# Patient Record
Sex: MALE | Race: WHITE | Employment: OTHER | URBAN - METROPOLITAN AREA
[De-identification: names, ages, dates, MRNs, and addresses within clinical notes are randomized per-mention and may not be internally consistent; named-entity substitution may affect disease eponyms.]

---

## 2017-02-08 ENCOUNTER — OFFICE VISIT (OUTPATIENT)
Dept: CARDIOLOGY CLINIC | Age: 62
End: 2017-02-08

## 2017-02-08 VITALS
HEIGHT: 66 IN | BODY MASS INDEX: 36.03 KG/M2 | SYSTOLIC BLOOD PRESSURE: 168 MMHG | WEIGHT: 224.2 LBS | HEART RATE: 84 BPM | DIASTOLIC BLOOD PRESSURE: 98 MMHG

## 2017-02-08 DIAGNOSIS — Z98.61 HISTORY OF PTCA: ICD-10-CM

## 2017-02-08 DIAGNOSIS — Z92.89 H/O ECHOCARDIOGRAM: ICD-10-CM

## 2017-02-08 DIAGNOSIS — E78.5 HYPERLIPIDEMIA, UNSPECIFIED HYPERLIPIDEMIA TYPE: ICD-10-CM

## 2017-02-08 DIAGNOSIS — Z86.19 HISTORY OF SHINGLES: ICD-10-CM

## 2017-02-08 DIAGNOSIS — Z79.4 TYPE 2 DIABETES MELLITUS WITH OTHER CIRCULATORY COMPLICATION, WITH LONG-TERM CURRENT USE OF INSULIN (HCC): ICD-10-CM

## 2017-02-08 DIAGNOSIS — I25.10 CORONARY ARTERY DISEASE INVOLVING NATIVE CORONARY ARTERY OF NATIVE HEART WITHOUT ANGINA PECTORIS: ICD-10-CM

## 2017-02-08 DIAGNOSIS — N20.0 KIDNEY STONE: ICD-10-CM

## 2017-02-08 DIAGNOSIS — E66.9 OBESITY, UNSPECIFIED OBESITY SEVERITY, UNSPECIFIED OBESITY TYPE: ICD-10-CM

## 2017-02-08 DIAGNOSIS — Z95.1 S/P CABG X 4: ICD-10-CM

## 2017-02-08 DIAGNOSIS — I10 ESSENTIAL HYPERTENSION: ICD-10-CM

## 2017-02-08 DIAGNOSIS — E66.09 NON MORBID OBESITY DUE TO EXCESS CALORIES: ICD-10-CM

## 2017-02-08 DIAGNOSIS — E11.59 TYPE 2 DIABETES MELLITUS WITH OTHER CIRCULATORY COMPLICATION, WITH LONG-TERM CURRENT USE OF INSULIN (HCC): ICD-10-CM

## 2017-02-08 DIAGNOSIS — I25.810 CORONARY ARTERY DISEASE INVOLVING CORONARY BYPASS GRAFT OF NATIVE HEART WITHOUT ANGINA PECTORIS: Primary | ICD-10-CM

## 2017-02-08 PROCEDURE — 99214 OFFICE O/P EST MOD 30 MIN: CPT | Performed by: INTERNAL MEDICINE

## 2017-02-08 RX ORDER — HYDROCHLOROTHIAZIDE 12.5 MG/1
12.5 TABLET ORAL DAILY
Qty: 90 TABLET | Refills: 3 | Status: SHIPPED | OUTPATIENT
Start: 2017-02-08 | End: 2017-06-01 | Stop reason: ALTCHOICE

## 2017-02-08 RX ORDER — LISINOPRIL 10 MG/1
10 TABLET ORAL DAILY
Qty: 90 TABLET | Refills: 3 | Status: ON HOLD | OUTPATIENT
Start: 2017-02-08 | End: 2017-02-12

## 2017-02-08 RX ORDER — ASPIRIN 81 MG/1
81 TABLET ORAL DAILY
Qty: 90 TABLET | Refills: 3 | Status: SHIPPED | OUTPATIENT
Start: 2017-02-08

## 2017-02-08 RX ORDER — CARVEDILOL 25 MG/1
25 TABLET ORAL 2 TIMES DAILY
Qty: 180 TABLET | Refills: 3 | Status: SHIPPED | OUTPATIENT
Start: 2017-02-08

## 2017-02-08 RX ORDER — ATORVASTATIN CALCIUM 10 MG/1
10 TABLET, FILM COATED ORAL DAILY
Qty: 90 TABLET | Refills: 3 | Status: SHIPPED | OUTPATIENT
Start: 2017-02-08 | End: 2018-02-27 | Stop reason: SDUPTHER

## 2017-02-11 PROBLEM — I16.1 HYPERTENSIVE EMERGENCY: Status: ACTIVE | Noted: 2017-02-11

## 2017-02-11 LAB
ALBUMIN SERPL-MCNC: 4 G/DL
ALP BLD-CCNC: 82 U/L
ALT SERPL-CCNC: 16 U/L
AST SERPL-CCNC: 20 U/L
BILIRUB SERPL-MCNC: 0.5 MG/DL (ref 0.1–1.4)
BUN BLDV-MCNC: 13 MG/DL
CALCIUM SERPL-MCNC: 9.7 MG/DL
CHLORIDE BLD-SCNC: 100 MMOL/L
CHOLESTEROL, TOTAL: 173 MG/DL
CHOLESTEROL/HDL RATIO: NORMAL
CO2: 28 MMOL/L
CREAT SERPL-MCNC: 0.8 MG/DL
GFR CALCULATED: NORMAL
GLUCOSE BLD-MCNC: 189 MG/DL
HDLC SERPL-MCNC: 60 MG/DL (ref 35–70)
LDL CHOLESTEROL CALCULATED: 92 MG/DL (ref 0–160)
POTASSIUM SERPL-SCNC: 4.1 MMOL/L
SODIUM BLD-SCNC: 138 MMOL/L
TOTAL PROTEIN: 6.9
TRIGL SERPL-MCNC: 103 MG/DL
VLDLC SERPL CALC-MCNC: 21 MG/DL

## 2017-02-23 ENCOUNTER — OFFICE VISIT (OUTPATIENT)
Dept: CARDIOLOGY CLINIC | Age: 62
End: 2017-02-23

## 2017-02-23 VITALS
WEIGHT: 224 LBS | HEART RATE: 88 BPM | DIASTOLIC BLOOD PRESSURE: 70 MMHG | BODY MASS INDEX: 36 KG/M2 | SYSTOLIC BLOOD PRESSURE: 138 MMHG | HEIGHT: 66 IN

## 2017-02-23 DIAGNOSIS — N20.0 KIDNEY STONE: ICD-10-CM

## 2017-02-23 DIAGNOSIS — I10 ESSENTIAL HYPERTENSION: ICD-10-CM

## 2017-02-23 DIAGNOSIS — I25.810 CORONARY ARTERY DISEASE INVOLVING CORONARY BYPASS GRAFT OF NATIVE HEART WITHOUT ANGINA PECTORIS: Primary | ICD-10-CM

## 2017-02-23 DIAGNOSIS — Z79.4 TYPE 2 DIABETES MELLITUS WITH OTHER CIRCULATORY COMPLICATION, WITH LONG-TERM CURRENT USE OF INSULIN (HCC): ICD-10-CM

## 2017-02-23 DIAGNOSIS — I25.10 CORONARY ARTERY DISEASE INVOLVING NATIVE CORONARY ARTERY OF NATIVE HEART WITHOUT ANGINA PECTORIS: ICD-10-CM

## 2017-02-23 DIAGNOSIS — E66.9 OBESITY, UNSPECIFIED OBESITY SEVERITY, UNSPECIFIED OBESITY TYPE: ICD-10-CM

## 2017-02-23 DIAGNOSIS — E78.5 HYPERLIPIDEMIA, UNSPECIFIED HYPERLIPIDEMIA TYPE: ICD-10-CM

## 2017-02-23 DIAGNOSIS — E11.59 TYPE 2 DIABETES MELLITUS WITH OTHER CIRCULATORY COMPLICATION, WITH LONG-TERM CURRENT USE OF INSULIN (HCC): ICD-10-CM

## 2017-02-23 DIAGNOSIS — Z95.1 S/P CABG X 4: ICD-10-CM

## 2017-02-23 DIAGNOSIS — Z86.19 HISTORY OF SHINGLES: ICD-10-CM

## 2017-02-23 DIAGNOSIS — Z98.61 HISTORY OF PTCA: ICD-10-CM

## 2017-02-23 PROCEDURE — 99214 OFFICE O/P EST MOD 30 MIN: CPT | Performed by: INTERNAL MEDICINE

## 2017-02-23 RX ORDER — ATORVASTATIN CALCIUM 40 MG/1
40 TABLET, FILM COATED ORAL DAILY
Qty: 90 TABLET | Refills: 0 | Status: CANCELLED | OUTPATIENT
Start: 2017-02-23

## 2017-02-23 RX ORDER — LISINOPRIL 20 MG/1
20 TABLET ORAL EVERY 12 HOURS SCHEDULED
Qty: 60 TABLET | Refills: 5 | Status: SHIPPED | OUTPATIENT
Start: 2017-02-23 | End: 2017-06-01 | Stop reason: ALTCHOICE

## 2017-03-13 ENCOUNTER — PROCEDURE VISIT (OUTPATIENT)
Dept: CARDIOLOGY CLINIC | Age: 62
End: 2017-03-13

## 2017-03-13 DIAGNOSIS — I25.10 CORONARY ARTERY DISEASE INVOLVING NATIVE CORONARY ARTERY OF NATIVE HEART WITHOUT ANGINA PECTORIS: ICD-10-CM

## 2017-03-13 DIAGNOSIS — E66.9 OBESITY, UNSPECIFIED OBESITY SEVERITY, UNSPECIFIED OBESITY TYPE: ICD-10-CM

## 2017-03-13 DIAGNOSIS — Z95.1 S/P CABG X 4: ICD-10-CM

## 2017-03-13 DIAGNOSIS — R09.89 BRUIT OF RIGHT CAROTID ARTERY: Primary | ICD-10-CM

## 2017-03-13 DIAGNOSIS — Z98.61 HISTORY OF PTCA: ICD-10-CM

## 2017-03-13 DIAGNOSIS — E78.5 HYPERLIPIDEMIA, UNSPECIFIED HYPERLIPIDEMIA TYPE: ICD-10-CM

## 2017-03-13 DIAGNOSIS — R06.02 SOB (SHORTNESS OF BREATH): Primary | ICD-10-CM

## 2017-03-13 DIAGNOSIS — E11.59 TYPE 2 DIABETES MELLITUS WITH OTHER CIRCULATORY COMPLICATION, WITH LONG-TERM CURRENT USE OF INSULIN (HCC): ICD-10-CM

## 2017-03-13 DIAGNOSIS — I10 ESSENTIAL HYPERTENSION: ICD-10-CM

## 2017-03-13 DIAGNOSIS — Z86.19 HISTORY OF SHINGLES: ICD-10-CM

## 2017-03-13 DIAGNOSIS — N20.0 KIDNEY STONE: ICD-10-CM

## 2017-03-13 DIAGNOSIS — I25.810 CORONARY ARTERY DISEASE INVOLVING CORONARY BYPASS GRAFT OF NATIVE HEART WITHOUT ANGINA PECTORIS: ICD-10-CM

## 2017-03-13 DIAGNOSIS — R06.02 SOB (SHORTNESS OF BREATH): ICD-10-CM

## 2017-03-13 DIAGNOSIS — Z79.4 TYPE 2 DIABETES MELLITUS WITH OTHER CIRCULATORY COMPLICATION, WITH LONG-TERM CURRENT USE OF INSULIN (HCC): ICD-10-CM

## 2017-03-13 DIAGNOSIS — R07.89 CHEST PRESSURE: Primary | ICD-10-CM

## 2017-03-13 LAB
LV EF: 55 %
LV EF: 55 %
LVEF MODALITY: NORMAL
LVEF MODALITY: NORMAL

## 2017-03-13 PROCEDURE — A9500 TC99M SESTAMIBI: HCPCS | Performed by: INTERNAL MEDICINE

## 2017-03-13 PROCEDURE — 93017 CV STRESS TEST TRACING ONLY: CPT | Performed by: INTERNAL MEDICINE

## 2017-03-13 PROCEDURE — 93016 CV STRESS TEST SUPVJ ONLY: CPT | Performed by: INTERNAL MEDICINE

## 2017-03-13 PROCEDURE — 93880 EXTRACRANIAL BILAT STUDY: CPT | Performed by: INTERNAL MEDICINE

## 2017-03-13 PROCEDURE — 93306 TTE W/DOPPLER COMPLETE: CPT | Performed by: INTERNAL MEDICINE

## 2017-03-13 PROCEDURE — 78452 HT MUSCLE IMAGE SPECT MULT: CPT | Performed by: INTERNAL MEDICINE

## 2017-03-13 PROCEDURE — 93018 CV STRESS TEST I&R ONLY: CPT | Performed by: INTERNAL MEDICINE

## 2017-03-14 ENCOUNTER — TELEPHONE (OUTPATIENT)
Dept: CARDIOLOGY CLINIC | Age: 62
End: 2017-03-14

## 2017-03-20 ENCOUNTER — OFFICE VISIT (OUTPATIENT)
Dept: CARDIOLOGY CLINIC | Age: 62
End: 2017-03-20

## 2017-03-20 VITALS
BODY MASS INDEX: 37.22 KG/M2 | WEIGHT: 231.6 LBS | HEIGHT: 66 IN | HEART RATE: 82 BPM | SYSTOLIC BLOOD PRESSURE: 124 MMHG | DIASTOLIC BLOOD PRESSURE: 76 MMHG

## 2017-03-20 DIAGNOSIS — Z79.4 TYPE 2 DIABETES MELLITUS WITH OTHER CIRCULATORY COMPLICATION, WITH LONG-TERM CURRENT USE OF INSULIN (HCC): ICD-10-CM

## 2017-03-20 DIAGNOSIS — E11.59 TYPE 2 DIABETES MELLITUS WITH OTHER CIRCULATORY COMPLICATION, WITH LONG-TERM CURRENT USE OF INSULIN (HCC): ICD-10-CM

## 2017-03-20 DIAGNOSIS — I10 ESSENTIAL HYPERTENSION: ICD-10-CM

## 2017-03-20 DIAGNOSIS — Z92.89 H/O ECHOCARDIOGRAM: ICD-10-CM

## 2017-03-20 DIAGNOSIS — E78.5 HYPERLIPIDEMIA, UNSPECIFIED HYPERLIPIDEMIA TYPE: ICD-10-CM

## 2017-03-20 DIAGNOSIS — N20.0 KIDNEY STONE: ICD-10-CM

## 2017-03-20 DIAGNOSIS — Z98.61 HISTORY OF PTCA: ICD-10-CM

## 2017-03-20 DIAGNOSIS — I25.810 CORONARY ARTERY DISEASE INVOLVING CORONARY BYPASS GRAFT OF NATIVE HEART WITHOUT ANGINA PECTORIS: Primary | ICD-10-CM

## 2017-03-20 DIAGNOSIS — E66.09 NON MORBID OBESITY DUE TO EXCESS CALORIES: ICD-10-CM

## 2017-03-20 DIAGNOSIS — Z86.19 HISTORY OF SHINGLES: ICD-10-CM

## 2017-03-20 DIAGNOSIS — Z95.1 S/P CABG X 4: ICD-10-CM

## 2017-03-20 PROCEDURE — 99214 OFFICE O/P EST MOD 30 MIN: CPT | Performed by: INTERNAL MEDICINE

## 2017-03-27 ENCOUNTER — PAT TELEPHONE (OUTPATIENT)
Dept: SURGERY | Age: 62
End: 2017-03-27

## 2017-03-27 VITALS — BODY MASS INDEX: 36.16 KG/M2 | HEIGHT: 66 IN | WEIGHT: 225 LBS

## 2017-03-27 ASSESSMENT — ENCOUNTER SYMPTOMS: SHORTNESS OF BREATH: 1

## 2017-03-28 ENCOUNTER — HOSPITAL ENCOUNTER (OUTPATIENT)
Dept: SURGERY | Age: 62
Discharge: HOME | End: 2017-03-28
Attending: SPECIALIST | Admitting: SPECIALIST

## 2017-03-28 VITALS
WEIGHT: 227 LBS | HEIGHT: 66 IN | DIASTOLIC BLOOD PRESSURE: 77 MMHG | TEMPERATURE: 97 F | RESPIRATION RATE: 16 BRPM | OXYGEN SATURATION: 96 % | HEART RATE: 73 BPM | SYSTOLIC BLOOD PRESSURE: 145 MMHG | BODY MASS INDEX: 36.48 KG/M2

## 2017-03-28 LAB — GLUCOSE BLD-MCNC: 78 MG/DL (ref 70–99)

## 2017-03-28 RX ORDER — SODIUM CHLORIDE, SODIUM LACTATE, POTASSIUM CHLORIDE, CALCIUM CHLORIDE 600; 310; 30; 20 MG/100ML; MG/100ML; MG/100ML; MG/100ML
INJECTION, SOLUTION INTRAVENOUS CONTINUOUS
Status: DISCONTINUED | OUTPATIENT
Start: 2017-03-28 | End: 2017-03-29 | Stop reason: HOSPADM

## 2017-03-28 RX ORDER — OMEPRAZOLE 20 MG/1
20 CAPSULE, DELAYED RELEASE ORAL DAILY
Qty: 30 CAPSULE | Refills: 3 | Status: SHIPPED | OUTPATIENT
Start: 2017-03-28 | End: 2019-02-20

## 2017-03-28 RX ADMIN — SODIUM CHLORIDE, SODIUM LACTATE, POTASSIUM CHLORIDE, CALCIUM CHLORIDE: 600; 310; 30; 20 INJECTION, SOLUTION INTRAVENOUS at 06:37

## 2017-03-28 ASSESSMENT — PAIN SCALES - GENERAL
PAINLEVEL_OUTOF10: 0
PAINLEVEL_OUTOF10: 0

## 2017-03-28 ASSESSMENT — PAIN - FUNCTIONAL ASSESSMENT: PAIN_FUNCTIONAL_ASSESSMENT: 0-10

## 2017-04-10 ENCOUNTER — HOSPITAL ENCOUNTER (OUTPATIENT)
Dept: GENERAL RADIOLOGY | Age: 62
Discharge: HOME | End: 2017-04-10
Attending: SPECIALIST | Admitting: SPECIALIST

## 2017-04-10 DIAGNOSIS — R10.9 ABDOMINAL PAIN, UNSPECIFIED SITE: ICD-10-CM

## 2017-04-24 ENCOUNTER — HOSPITAL ENCOUNTER (OUTPATIENT)
Dept: OTHER | Age: 62
Discharge: HOME | End: 2017-04-24
Attending: PHYSICIAN ASSISTANT | Admitting: PHYSICIAN ASSISTANT

## 2017-04-24 DIAGNOSIS — M25.552 PAIN IN JOINT, PELVIC REGION AND THIGH, LEFT: ICD-10-CM

## 2017-04-24 DIAGNOSIS — M54.5 LOW BACK PAIN, UNSPECIFIED BACK PAIN LATERALITY, UNSPECIFIED CHRONICITY, WITH SCIATICA PRESENCE UNSPECIFIED: ICD-10-CM

## 2017-04-24 DIAGNOSIS — M25.551 PAIN IN JOINT, PELVIC REGION AND THIGH, RIGHT: ICD-10-CM

## 2017-06-15 RX ORDER — ISOSORBIDE MONONITRATE 60 MG/1
60 TABLET, EXTENDED RELEASE ORAL DAILY
Qty: 30 TABLET | Refills: 6 | Status: SHIPPED | OUTPATIENT
Start: 2017-06-15 | End: 2018-01-03 | Stop reason: SDUPTHER

## 2017-09-18 ENCOUNTER — OFFICE VISIT (OUTPATIENT)
Dept: CARDIOLOGY CLINIC | Age: 62
End: 2017-09-18

## 2017-09-18 VITALS
BODY MASS INDEX: 35.2 KG/M2 | HEIGHT: 66 IN | WEIGHT: 219 LBS | HEART RATE: 68 BPM | DIASTOLIC BLOOD PRESSURE: 78 MMHG | SYSTOLIC BLOOD PRESSURE: 116 MMHG

## 2017-09-18 DIAGNOSIS — E78.5 HYPERLIPIDEMIA, UNSPECIFIED HYPERLIPIDEMIA TYPE: ICD-10-CM

## 2017-09-18 DIAGNOSIS — Z98.61 HISTORY OF PTCA: ICD-10-CM

## 2017-09-18 DIAGNOSIS — I25.810 CORONARY ARTERY DISEASE INVOLVING CORONARY BYPASS GRAFT OF NATIVE HEART WITHOUT ANGINA PECTORIS: Primary | ICD-10-CM

## 2017-09-18 DIAGNOSIS — E66.09 NON MORBID OBESITY DUE TO EXCESS CALORIES: ICD-10-CM

## 2017-09-18 DIAGNOSIS — I10 ESSENTIAL HYPERTENSION: ICD-10-CM

## 2017-09-18 DIAGNOSIS — E11.59 TYPE 2 DIABETES MELLITUS WITH OTHER CIRCULATORY COMPLICATION, UNSPECIFIED LONG TERM INSULIN USE STATUS: ICD-10-CM

## 2017-09-18 DIAGNOSIS — Z95.1 S/P CABG X 4: ICD-10-CM

## 2017-09-18 DIAGNOSIS — N20.0 KIDNEY STONE: ICD-10-CM

## 2017-09-18 PROCEDURE — 99213 OFFICE O/P EST LOW 20 MIN: CPT | Performed by: INTERNAL MEDICINE

## 2018-01-05 RX ORDER — ISOSORBIDE MONONITRATE 60 MG/1
60 TABLET, EXTENDED RELEASE ORAL DAILY
Qty: 90 TABLET | Refills: 3 | Status: SHIPPED | OUTPATIENT
Start: 2018-01-05 | End: 2018-08-22

## 2018-02-06 ENCOUNTER — OFFICE VISIT (OUTPATIENT)
Dept: CARDIOLOGY CLINIC | Age: 63
End: 2018-02-06

## 2018-02-06 VITALS
BODY MASS INDEX: 34.75 KG/M2 | DIASTOLIC BLOOD PRESSURE: 80 MMHG | HEIGHT: 66 IN | SYSTOLIC BLOOD PRESSURE: 138 MMHG | WEIGHT: 216.2 LBS | HEART RATE: 84 BPM

## 2018-02-06 DIAGNOSIS — Z95.1 S/P CABG X 4: ICD-10-CM

## 2018-02-06 DIAGNOSIS — N20.0 KIDNEY STONE: ICD-10-CM

## 2018-02-06 DIAGNOSIS — E11.59 TYPE 2 DIABETES MELLITUS WITH OTHER CIRCULATORY COMPLICATION, UNSPECIFIED LONG TERM INSULIN USE STATUS: ICD-10-CM

## 2018-02-06 DIAGNOSIS — Z98.61 HISTORY OF PTCA: ICD-10-CM

## 2018-02-06 DIAGNOSIS — E78.5 HYPERLIPIDEMIA, UNSPECIFIED HYPERLIPIDEMIA TYPE: ICD-10-CM

## 2018-02-06 DIAGNOSIS — E66.09 CLASS 2 OBESITY DUE TO EXCESS CALORIES WITHOUT SERIOUS COMORBIDITY WITH BODY MASS INDEX (BMI) OF 35.0 TO 35.9 IN ADULT: ICD-10-CM

## 2018-02-06 DIAGNOSIS — I10 ESSENTIAL HYPERTENSION: ICD-10-CM

## 2018-02-06 DIAGNOSIS — I25.810 CORONARY ARTERY DISEASE INVOLVING CORONARY BYPASS GRAFT OF NATIVE HEART WITHOUT ANGINA PECTORIS: Primary | ICD-10-CM

## 2018-02-06 PROCEDURE — 99214 OFFICE O/P EST MOD 30 MIN: CPT | Performed by: INTERNAL MEDICINE

## 2018-02-06 RX ORDER — LISINOPRIL 40 MG/1
40 TABLET ORAL DAILY
Qty: 90 TABLET | Refills: 3 | Status: SHIPPED | OUTPATIENT
Start: 2018-02-06

## 2018-02-06 NOTE — PROGRESS NOTES
5' 6\" (1.676 m)   Wt 216 lb 3.2 oz (98.1 kg)   BMI 34.90 kg/m²   Wt Readings from Last 3 Encounters:   02/06/18 216 lb 3.2 oz (98.1 kg)   09/18/17 219 lb (99.3 kg)   06/01/17 220 lb (99.8 kg)     Body mass index is 34.9 kg/m². GENERAL - Alert, oriented, pleasant, in no apparent distress. EYES: No jaundice, no conjunctival pallor. SKIN: It is warm & dry. No rashes. No Echhymosis    HEENT  No clinically significant abnormalities seen. Neck - Supple. No jugular venous distention noted. No carotid bruits. Cardiovascular  Normal S1 and S2 without obvious murmur or gallop. Extremities - No cyanosis, clubbing, or significant edema. Pulmonary  No respiratory distress. No wheezes or rales. Abdomen  No masses, tenderness, or organomegaly. Musculoskeletal  No significant edema. No joint deformities. No muscle wasting. Neurologic  Cranial nerves II through XII are grossly intact. There were no gross focal neurologic abnormalities.     Lab Review   Lab Results   Component Value Date    TROPONINT <0.010 02/11/2017     BNP:    Lab Results   Component Value Date     03/06/2013     PT/INR:    Lab Results   Component Value Date    INR 1.04 02/08/2013     Lab Results   Component Value Date    LABA1C 7.9 (H) 02/11/2017    LABA1C 7.1 06/01/2015     Lab Results   Component Value Date    WBC 11.6 (H) 02/12/2017    HCT 44.8 02/12/2017    MCV 94.9 02/12/2017     02/12/2017     Lab Results   Component Value Date    CHOL 173 02/10/2017    TRIG 103 02/10/2017    HDL 60 02/10/2017    LDLCALC 92 02/10/2017    LDLDIRECT 66 12/02/2014     Lab Results   Component Value Date    ALT 16 02/11/2017    AST 15 02/11/2017     BMP:    Lab Results   Component Value Date     02/11/2017    K 4.3 02/11/2017    CL 97 02/11/2017    CO2 27 02/11/2017    BUN 11 02/11/2017    CREATININE 0.7 02/11/2017     CMP:   Lab Results   Component Value Date     02/11/2017    K 4.3 02/11/2017    CL 97 02/11/2017    CO2 27

## 2018-02-27 DIAGNOSIS — Z92.89 H/O ECHOCARDIOGRAM: ICD-10-CM

## 2018-02-27 DIAGNOSIS — Z86.19 HISTORY OF SHINGLES: ICD-10-CM

## 2018-02-27 DIAGNOSIS — I10 ESSENTIAL HYPERTENSION: ICD-10-CM

## 2018-02-27 DIAGNOSIS — Z95.1 S/P CABG X 4: ICD-10-CM

## 2018-02-27 DIAGNOSIS — E66.9 OBESITY, UNSPECIFIED OBESITY SEVERITY, UNSPECIFIED OBESITY TYPE: ICD-10-CM

## 2018-02-27 DIAGNOSIS — N20.0 KIDNEY STONE: ICD-10-CM

## 2018-02-27 DIAGNOSIS — I25.10 CORONARY ARTERY DISEASE INVOLVING NATIVE CORONARY ARTERY OF NATIVE HEART WITHOUT ANGINA PECTORIS: ICD-10-CM

## 2018-02-27 DIAGNOSIS — Z98.61 HISTORY OF PTCA: ICD-10-CM

## 2018-02-27 RX ORDER — ATORVASTATIN CALCIUM 10 MG/1
10 TABLET, FILM COATED ORAL DAILY
Qty: 90 TABLET | Refills: 3 | Status: SHIPPED | OUTPATIENT
Start: 2018-02-27

## 2018-03-20 ENCOUNTER — HOSPITAL ENCOUNTER (OUTPATIENT)
Dept: GENERAL RADIOLOGY | Age: 63
Discharge: HOME | End: 2018-03-20
Attending: PHYSICIAN ASSISTANT | Admitting: PHYSICIAN ASSISTANT

## 2018-03-20 DIAGNOSIS — E11.8 TYPE 2 DIABETES MELLITUS WITH COMPLICATION, UNSPECIFIED LONG TERM INSULIN USE STATUS: ICD-10-CM

## 2018-08-22 ENCOUNTER — OFFICE VISIT (OUTPATIENT)
Dept: CARDIOLOGY CLINIC | Age: 63
End: 2018-08-22

## 2018-08-22 VITALS
DIASTOLIC BLOOD PRESSURE: 70 MMHG | WEIGHT: 221.4 LBS | BODY MASS INDEX: 35.58 KG/M2 | HEIGHT: 66 IN | HEART RATE: 80 BPM | SYSTOLIC BLOOD PRESSURE: 120 MMHG

## 2018-08-22 DIAGNOSIS — E78.5 HYPERLIPIDEMIA, UNSPECIFIED HYPERLIPIDEMIA TYPE: ICD-10-CM

## 2018-08-22 DIAGNOSIS — E11.59 TYPE 2 DIABETES MELLITUS WITH OTHER CIRCULATORY COMPLICATION, UNSPECIFIED WHETHER LONG TERM INSULIN USE (HCC): ICD-10-CM

## 2018-08-22 DIAGNOSIS — Z98.61 HISTORY OF PTCA: ICD-10-CM

## 2018-08-22 DIAGNOSIS — I10 ESSENTIAL HYPERTENSION: ICD-10-CM

## 2018-08-22 DIAGNOSIS — Z95.1 S/P CABG X 4: ICD-10-CM

## 2018-08-22 DIAGNOSIS — I25.810 CORONARY ARTERY DISEASE INVOLVING CORONARY BYPASS GRAFT OF NATIVE HEART WITHOUT ANGINA PECTORIS: Primary | ICD-10-CM

## 2018-08-22 PROCEDURE — 99214 OFFICE O/P EST MOD 30 MIN: CPT | Performed by: NURSE PRACTITIONER

## 2018-08-22 RX ORDER — ISOSORBIDE MONONITRATE 30 MG/1
30 TABLET, EXTENDED RELEASE ORAL 2 TIMES DAILY
Qty: 60 TABLET | Refills: 5 | Status: SHIPPED | OUTPATIENT
Start: 2018-08-22 | End: 2019-02-11 | Stop reason: SDUPTHER

## 2018-08-22 NOTE — PROGRESS NOTES
and horizontal axis. Normal LV function & wall motion. LVEF is 55 %    History of echocardiogram 03/13/2017    Normal LV systolic functionEjection fraction is visually estimated at 55%. Mild concentric left ventricular hypertrophy. Grade I diastolic dysfunction. No regional wall motion abnormalites. The left atrium is mildly dilated. No significant valvular disease noted. Mild tricuspid regurgitation. No evidence of pericardial effusion.  History of PTCA 11/2010 1 stent 12/2010 2 stents    stents 3 mid lad prox lad & rca    History of shingles Last Episode In 2000's    \"Face, Torso\"    Hyperlipidemia     Hypertension     Kidney stone 1990's    Kidney Stone Surgery    S/P CABG x 4 2/8/2013 2/8/2013-LIMA to LAD; VG to OM;VG to Diagonal; VG to RCA -Dr Reece Farrar- report in epic    Shines Patient states that he started Valtrex for this 6/30/14.     Shortness of breath on exertion     Wears glasses      Past Surgical History:   Procedure Laterality Date    CARDIAC CATHETERIZATION  2/5/2013 2/5/2013-Severe 3 vessel disease- Recomm CABG- report in [address] [place] [address]  2-8-13    CABG (4 Bypasses)    COLONOSCOPY  Last Done 10-8-15    CORONARY ANGIOPLASTY  11-10    One Heart Stent    CORONARY ANGIOPLASTY  1-11-11    Two Heart Stents    CORONARY ARTERY BYPASS GRAFT  2/8/2013 2/8/2013- CABG X 4-LIMA to LAD; VG to OM;VG to Diagonal; VG to RCA -Dr Cammy Salas      All Teeth Extracted In Past    ENDOSCOPY, COLON, DIAGNOSTIC  03/28/2017    normal esophagus, gastritis found, moderate amount of food particles in the stomach    INGUINAL HERNIA REPAIR Left 1971    KIDNEY STONE SURGERY  1990's    KNEE ARTHROSCOPY Left 5/26/16    Partial medial and lateral meniscectomy & chondroplasty     Family History   Problem Relation Age of Onset    Heart Disease Mother         Heart Attack    Early Death Mother         Heart Attack    Early Death Brother         64 Or 62    Substance Abuse Brother in all lung fields   Pulses: Bilateral radial and pedal pulses normal  Abdomen  no tenderness  Musculoskeletal  normal strength  Neurologic  There are no gross focal neurologic abnormalities. Skin-  No rash  Affect- normal mood    DATA:  Lab Results   Component Value Date    TROPONINI 0.015 03/06/2013     BNP:    Lab Results   Component Value Date     03/06/2013     PT/INR:  No results found for: PTINR  Lab Results   Component Value Date    LABA1C 7.9 (H) 02/11/2017    LABA1C 7.1 06/01/2015     Lab Results   Component Value Date    CHOL 173 02/10/2017    TRIG 103 02/10/2017    HDL 60 02/10/2017    LDLCALC 92 02/10/2017    LDLDIRECT 66 12/02/2014     Lab Results   Component Value Date    ALT 16 02/11/2017    AST 15 02/11/2017     TSH:  No results found for: TSH    Assessment/ Plan:     Patient seen,  interviewed and examined     CAD    s/p CABG-s/p PTCA Stable continue with medications   03/2017  NM myocardial  Normal perfusion study with normal distribution in all coronal, short, and    horizontal axis.    Normal LV function & wall motion. LVEF is 55 %    Supervising physician Dr. Esdras Palmer           HTN    Controlled  advised low salt diet  03/2017 Echo  Normal LV systolic function   Ejection fraction is visually estimated at 55%.   Mild concentric left ventricular hypertrophy.   Grade I diastolic dysfunction.   No regional wall motion abnormalites.   The left atrium is mildly dilated.   No significant valvular disease noted.   Mild tricuspid regurgitation.   No evidence of pericardial effusion.     Hyperlipidemia    Labs noted from 2017 HDL 60  LDL 92  Not yet at goal   Will get more recent labs  agressive risk factor modification for goal.     Diabetes   A1C 5.1 in April  On meds  Follows closely        Patient is encouraged to exercise even a brisk walk for 30 minutes at least 3 to 4 times a week. Lifestyle and risk factor modificatons discussed. Various goals are discussed and questions answered.

## 2019-02-11 RX ORDER — ISOSORBIDE MONONITRATE 30 MG/1
30 TABLET, EXTENDED RELEASE ORAL 2 TIMES DAILY
Qty: 60 TABLET | Refills: 5 | Status: SHIPPED | OUTPATIENT
Start: 2019-02-11 | End: 2019-10-10 | Stop reason: SDUPTHER

## 2019-02-20 ENCOUNTER — OFFICE VISIT (OUTPATIENT)
Dept: CARDIOLOGY CLINIC | Age: 64
End: 2019-02-20
Payer: MEDICARE

## 2019-02-20 VITALS
WEIGHT: 218.8 LBS | SYSTOLIC BLOOD PRESSURE: 128 MMHG | HEART RATE: 78 BPM | DIASTOLIC BLOOD PRESSURE: 64 MMHG | HEIGHT: 66 IN | BODY MASS INDEX: 35.17 KG/M2

## 2019-02-20 DIAGNOSIS — E11.59 TYPE 2 DIABETES MELLITUS WITH OTHER CIRCULATORY COMPLICATION, UNSPECIFIED WHETHER LONG TERM INSULIN USE (HCC): ICD-10-CM

## 2019-02-20 DIAGNOSIS — I10 ESSENTIAL HYPERTENSION: ICD-10-CM

## 2019-02-20 DIAGNOSIS — I25.810 CORONARY ARTERY DISEASE INVOLVING CORONARY BYPASS GRAFT OF NATIVE HEART WITHOUT ANGINA PECTORIS: Primary | ICD-10-CM

## 2019-02-20 DIAGNOSIS — Z98.61 HISTORY OF PTCA: ICD-10-CM

## 2019-02-20 DIAGNOSIS — E66.09 CLASS 2 OBESITY DUE TO EXCESS CALORIES WITHOUT SERIOUS COMORBIDITY WITH BODY MASS INDEX (BMI) OF 35.0 TO 35.9 IN ADULT: ICD-10-CM

## 2019-02-20 DIAGNOSIS — Z95.1 S/P CABG X 4: ICD-10-CM

## 2019-02-20 DIAGNOSIS — E78.5 HYPERLIPIDEMIA, UNSPECIFIED HYPERLIPIDEMIA TYPE: ICD-10-CM

## 2019-02-20 PROCEDURE — 99214 OFFICE O/P EST MOD 30 MIN: CPT | Performed by: INTERNAL MEDICINE

## 2019-02-20 RX ORDER — AMLODIPINE BESYLATE 5 MG/1
5 TABLET ORAL DAILY
COMMUNITY

## 2019-03-04 ENCOUNTER — PROCEDURE VISIT (OUTPATIENT)
Dept: CARDIOLOGY CLINIC | Age: 64
End: 2019-03-04
Payer: MEDICARE

## 2019-03-04 DIAGNOSIS — E78.5 HYPERLIPIDEMIA, UNSPECIFIED HYPERLIPIDEMIA TYPE: ICD-10-CM

## 2019-03-04 DIAGNOSIS — Z95.1 S/P CABG X 4: ICD-10-CM

## 2019-03-04 DIAGNOSIS — I10 ESSENTIAL HYPERTENSION: ICD-10-CM

## 2019-03-04 DIAGNOSIS — E66.09 CLASS 2 OBESITY DUE TO EXCESS CALORIES WITHOUT SERIOUS COMORBIDITY WITH BODY MASS INDEX (BMI) OF 35.0 TO 35.9 IN ADULT: ICD-10-CM

## 2019-03-04 DIAGNOSIS — I25.810 CORONARY ARTERY DISEASE INVOLVING CORONARY BYPASS GRAFT OF NATIVE HEART WITHOUT ANGINA PECTORIS: ICD-10-CM

## 2019-03-04 DIAGNOSIS — E11.59 TYPE 2 DIABETES MELLITUS WITH OTHER CIRCULATORY COMPLICATION, UNSPECIFIED WHETHER LONG TERM INSULIN USE (HCC): ICD-10-CM

## 2019-03-04 DIAGNOSIS — Z98.61 HISTORY OF PTCA: ICD-10-CM

## 2019-03-04 DIAGNOSIS — R09.89 BRUIT OF RIGHT CAROTID ARTERY: Primary | ICD-10-CM

## 2019-03-04 PROCEDURE — 93880 EXTRACRANIAL BILAT STUDY: CPT | Performed by: INTERNAL MEDICINE

## 2019-03-06 ENCOUNTER — TELEPHONE (OUTPATIENT)
Dept: CARDIOLOGY CLINIC | Age: 64
End: 2019-03-06

## 2019-08-28 ENCOUNTER — OFFICE VISIT (OUTPATIENT)
Dept: CARDIOLOGY CLINIC | Age: 64
End: 2019-08-28
Payer: MEDICARE

## 2019-08-28 VITALS
WEIGHT: 199.6 LBS | SYSTOLIC BLOOD PRESSURE: 124 MMHG | HEART RATE: 64 BPM | DIASTOLIC BLOOD PRESSURE: 62 MMHG | BODY MASS INDEX: 32.08 KG/M2 | HEIGHT: 66 IN

## 2019-08-28 DIAGNOSIS — Z98.61 HISTORY OF PTCA: ICD-10-CM

## 2019-08-28 DIAGNOSIS — E78.5 HYPERLIPIDEMIA, UNSPECIFIED HYPERLIPIDEMIA TYPE: ICD-10-CM

## 2019-08-28 DIAGNOSIS — Z95.1 S/P CABG X 4: ICD-10-CM

## 2019-08-28 DIAGNOSIS — I10 ESSENTIAL HYPERTENSION: ICD-10-CM

## 2019-08-28 DIAGNOSIS — I25.810 CORONARY ARTERY DISEASE INVOLVING CORONARY BYPASS GRAFT OF NATIVE HEART WITHOUT ANGINA PECTORIS: Primary | ICD-10-CM

## 2019-08-28 DIAGNOSIS — E11.59 TYPE 2 DIABETES MELLITUS WITH OTHER CIRCULATORY COMPLICATION, UNSPECIFIED WHETHER LONG TERM INSULIN USE (HCC): ICD-10-CM

## 2019-08-28 DIAGNOSIS — E66.09 CLASS 2 OBESITY DUE TO EXCESS CALORIES WITHOUT SERIOUS COMORBIDITY WITH BODY MASS INDEX (BMI) OF 35.0 TO 35.9 IN ADULT: ICD-10-CM

## 2019-08-28 PROCEDURE — 99213 OFFICE O/P EST LOW 20 MIN: CPT | Performed by: INTERNAL MEDICINE

## 2019-10-11 RX ORDER — ISOSORBIDE MONONITRATE 30 MG/1
30 TABLET, EXTENDED RELEASE ORAL 2 TIMES DAILY
Qty: 60 TABLET | Refills: 5 | Status: SHIPPED | OUTPATIENT
Start: 2019-10-11

## 2019-10-22 PROBLEM — G62.9 NEUROPATHY: Status: ACTIVE | Noted: 2019-10-22

## 2019-10-22 PROBLEM — I73.9 PVD (PERIPHERAL VASCULAR DISEASE) (HCC): Status: ACTIVE | Noted: 2019-10-22

## 2020-02-26 ENCOUNTER — OFFICE VISIT (OUTPATIENT)
Dept: CARDIOLOGY CLINIC | Age: 65
End: 2020-02-26
Payer: MEDICARE

## 2020-02-26 VITALS
HEART RATE: 74 BPM | WEIGHT: 204.4 LBS | DIASTOLIC BLOOD PRESSURE: 68 MMHG | HEIGHT: 66 IN | BODY MASS INDEX: 32.85 KG/M2 | SYSTOLIC BLOOD PRESSURE: 112 MMHG

## 2020-02-26 PROCEDURE — 99214 OFFICE O/P EST MOD 30 MIN: CPT | Performed by: NURSE PRACTITIONER

## 2020-02-26 PROCEDURE — 93000 ELECTROCARDIOGRAM COMPLETE: CPT | Performed by: NURSE PRACTITIONER

## 2020-02-26 NOTE — LETTER
CLINICAL STAFF DOCUMENTATION    Jayson Linares  1955  [id number]    Have you had any Chest Pain - No     Have you had any Shortness of Breath - No    Have you had any dizziness - No    Have you had any palpitations - No    Do you have any edema - No     Check Venous \"LEG PROBLEM Questionnaire\"    Do you have a surgery or procedure scheduled in the near future - No  If Yes- DO EKG  If Yes - Who is the surgery with?   Phone number of physician   Fax number of physician  Type of surgery   BE SURE TO GIVE THIS INFORMATION to [facility]    Ask patient if they want to sign up for MyChart if they are not already signed up    Check to see if we have an E-MAIL on file for the patient    Check medication list thoroughly!!!  BE SURE TO ASK PATIENT IF THEY NEED MEDICATION REFILLS

## 2020-02-26 NOTE — PROGRESS NOTES
KWIKPEN 100 UNIT/ML injection pen   6    metFORMIN (GLUCOPHAGE) 1000 MG tablet Take 1,000 mg by mouth 2 times daily (with meals)      aspirin EC 81 MG EC tablet Take 1 tablet by mouth daily 90 tablet 3    carvedilol (COREG) 25 MG tablet Take 1 tablet by mouth 2 times daily 180 tablet 3    oxyCODONE-acetaminophen (PERCOCET) 5-325 MG per tablet Take 1 tablet by mouth every 4 hours as needed for Pain 20 tablet 0    Insulin Aspart (NOVOLOG FLEXPEN SC) Inject 20 Units into the skin Per Sliding Scale        No current facility-administered medications for this visit. Allergies: Pcn [penicillins] and Morphine  Past Medical History:   Diagnosis Date    [facility] rest [facility])     Arthritis     \"Hands, Knees, Shoulders\"    CAD (coronary artery disease)     Sees Dr. Johnathan Hand Carotid  Doppler 03/13/2017    Duplex sonography with color flow enhancement was performed bilaterally on the cervical carotid system. No evidence of hemodynamically significant stenosis in the bilateral internal carotid arteries. There is evidence of hemodynamically significant stenosis of the bilateral external carotid arteries, L > R.    Cellulitis 2/2/2013    5th finger-Left hand-ER visit -report in Epic    Chronic back pain     Diabetes mellitus ([address] Dx 2007    Fractured rib Dx 9-13    Full dentures     Gonorrhea Dx 18's    \"Had Treatment\"    H/O cardiac catheterization 2/5/2013 2/5/2013-Severe 3 vessel CAD-recomm CABG- Dr Delphine Childs - report in epic    H/O cardiovascular stress test 2/4/2013 2/4/2013-mod ischemia inferior wall. EF 53%. LVSF normal-Dr Raad Aguilar - report [facility]    H/O cardiovascular stress test 1/16/2015    treadmill    H/O Doppler ultrasound 2/6/2013    CAROTID DOPPLER-2/6/2013-There is no hemodynamic significant stenosis present.estimated 16-49 % stenosis of both internal carotid arteries. - report in epic    H/O echocardiogram 03/08/2013    [id number]%, LV systolic function is borderline reduced, , Past    ENDOSCOPY, COLON, DIAGNOSTIC  03/28/2017    normal esophagus, gastritis found, moderate amount of food particles in the stomach    INGUINAL HERNIA REPAIR Left 1971    KIDNEY STONE SURGERY  1990's    KNEE ARTHROSCOPY Left 5/26/16    Partial medial and lateral meniscectomy & chondroplasty     Family History   Problem Relation Age of Onset    Heart Disease Mother         Heart Attack    Early Death Mother         Heart Attack    Early Death Brother         64 Or 62    Substance Abuse Brother         Alcoholic    Early Death Father 48        \"He Drank Himself To Death\"    Substance Abuse Father         Alcohol    Cirrhosis Father     Early Death Sister 2        Leukemia    Cancer Sister         Leukemia    Early Death Sister 61        Stroke    Stroke Sister     Arthritis Sister     Heart Disease Sister     Vision Loss Sister         \"RP\"     Social History     Tobacco Use    Smoking status: Never Smoker    Smokeless tobacco: Never Used   Substance Use Topics    Alcohol use: No     Alcohol/week: 0.0 standard drinks        Review of Systems:   · Constitutional: No Fever,no unintentional weight Loss   · Eyes: No change in Vision:     · ENT: No Headaches, Hearing Loss or Vertigo. No tinnitus   · Cardiovascular: as per note above   · Respiratory: No cough or wheezing and as per note above.    · Gastrointestinal: no abdominal pain, no appetite loss, no blood in stools, constipation, or diarrhea, No heartburn  · Genitourinary: No dysuria, trouble voiding, or hematuria  · Musculoskeletal: back pain- No: myalgia No,  Arthralgia- No  · Integumentary: No rash or pruritis  · Neurological: No TIA or stroke symptoms  · Psychiatric: Anxiety- No: depression-  No   · Endocrine: No malaise, No fatigue - no temperature intolerance  · Hematologic/Lymphatic: No bleeding problems, blood clots or swollen lymph nodes  · Allergic/Immunologic: No nasal congestion or hives    Objective:      Physical Exam:  /68 Pulse 74   Ht 5' 6\" (1.676 m)   Wt 204 lb 6.4 oz (92.7 kg)   BMI 32.99 kg/m²   Wt Readings from Last 3 Encounters:   02/26/20 204 lb 6.4 oz (92.7 kg)   11/07/19 201 lb (91.2 kg)   10/22/19 201 lb (91.2 kg)     Body mass index is 32.99 kg/m². Physical Exam  Constitutional:       Appearance: Normal appearance. HENT:      Head: Normocephalic and atraumatic. Right Ear: External ear normal.      Left Ear: External ear normal.      Nose: Nose normal.      Mouth/Throat:      Mouth: Mucous membranes are moist.      Pharynx: Oropharynx is clear. Eyes:      Conjunctiva/sclera: Conjunctivae normal.      Pupils: Pupils are equal, round, and reactive to light. Neck:      Musculoskeletal: Normal range of motion and neck supple. Cardiovascular:      Rate and Rhythm: Regular rhythm. Pulses: Normal pulses. Heart sounds: Normal heart sounds. Pulmonary:      Effort: Pulmonary effort is normal.      Breath sounds: Normal breath sounds. Abdominal:      Palpations: Abdomen is soft. Tenderness: There is no abdominal tenderness. There is no guarding. Musculoskeletal: Normal range of motion. Left lower leg: No edema. Skin:     General: Skin is warm and dry. Capillary Refill: Capillary refill takes less than 2 seconds. Neurological:      Mental Status: He is alert and oriented to person, place, and time. Motor: No weakness.    Psychiatric:         Mood and Affect: Mood normal.            DATA  BNP:   Lab Results   Component Value Date     (H) 03/06/2013    PROBNP 319.3 (H) 02/10/2017     CBC:   Lab Results   Component Value Date    WBC 11.6 02/12/2017    RBC 4.72 02/12/2017    HGB 15.0 02/12/2017    HCT 44.8 02/12/2017     02/12/2017     CMP:    Lab Results   Component Value Date     02/11/2017    K 4.3 02/11/2017    CL 97 02/11/2017    CO2 27 02/11/2017    BUN 11 02/11/2017    CREATININE 0.7 02/11/2017    GFRAA >60 02/11/2017    LABGLOM >60 02/11/2017    GLUCOSE 189 02/10/2017    CALCIUM 9.3 02/11/2017     Hepatic Function Panel:    Lab Results   Component Value Date    ALKPHOS 85 02/11/2017    ALT 16 02/11/2017    AST 15 02/11/2017    PROT 6.6 02/11/2017    PROT 7.7 03/06/2013    BILITOT 0.4 02/11/2017    BILIDIR 0.1 02/03/2013    IBILI 0.4 02/03/2013    LABALBU 4.0 02/11/2017     Magnesium:    Lab Results   Component Value Date    MG 1.8 02/10/2017     PT/INR:    Lab Results   Component Value Date    PROTIME 11.3 02/08/2013    PROTIME 10.8 12/07/2010    INR 1.04 02/08/2013     Lipids:    Lab Results   Component Value Date    TRIG 103 02/10/2017    HDL 60 02/10/2017    LDLCALC 92 02/10/2017    LDLDIRECT 66 12/02/2014     Lab Results   Component Value Date    LABA1C 7.9 (H) 02/11/2017    LABA1C 7.1 06/01/2015     TSH:  No results found for: TSH      Assessment/ Plan:    Patient seen, interviewed and examined. Testing was reviewed. 03/2019 carotid doppler  Mild (0-49%) disease of the Bilateral Internal carotid artery.    Significant stenosis of the bilateral external carotid arteries, L > R.    Normal right vertebral flow.    Left vertebral flow non visualized. CAD    H/o:  2010 PCI of proximal LAD and RCA 2013 CABG x 4   Stable   He is to continue with current medications:  Encouraged diet and exercise- recommend low fat low cholesterol diet  encouraged 30 minutes of exercise 5 times a week     HTN    Controlled  He is to continue current medications     Hyperlipidemia  No recent labs available-lab slip given   He is to continue current medications    Diabetes mellitus  Blood sugar is  well controlled  Hemoglobin A1c 5.9  On medications and followed per PCP       Lifestyle and risk factor modificatons discussed. Various goals are discussed and questions answered. Continue current medications. Appropriate prescriptions are addressed. Questions answered and patient verbalizes understanding. Call for any problems, questions, or concerns.

## 2020-02-27 ENCOUNTER — HOSPITAL ENCOUNTER (EMERGENCY)
Age: 65
Discharge: HOME | End: 2020-02-27
Attending: EMERGENCY MEDICINE
Payer: MEDICARE

## 2020-02-27 VITALS
RESPIRATION RATE: 18 BRPM | BODY MASS INDEX: 32.14 KG/M2 | SYSTOLIC BLOOD PRESSURE: 150 MMHG | HEIGHT: 66 IN | DIASTOLIC BLOOD PRESSURE: 81 MMHG | TEMPERATURE: 98.9 F | WEIGHT: 200 LBS | OXYGEN SATURATION: 95 % | HEART RATE: 87 BPM

## 2020-02-27 PROCEDURE — 6370000000 HC RX 637 (ALT 250 FOR IP): Performed by: EMERGENCY MEDICINE

## 2020-02-27 PROCEDURE — 99282 EMERGENCY DEPT VISIT SF MDM: CPT

## 2020-02-27 RX ORDER — METRONIDAZOLE 250 MG/1
500 TABLET ORAL ONCE
Status: COMPLETED | OUTPATIENT
Start: 2020-02-27 | End: 2020-02-27

## 2020-02-27 RX ORDER — LIDOCAINE 40 MG/G
CREAM TOPICAL ONCE
Status: DISCONTINUED | OUTPATIENT
Start: 2020-02-27 | End: 2020-02-27 | Stop reason: HOSPADM

## 2020-02-27 RX ORDER — LIDOCAINE 50 MG/G
OINTMENT TOPICAL ONCE
Status: DISCONTINUED | OUTPATIENT
Start: 2020-02-27 | End: 2020-02-27

## 2020-02-27 RX ORDER — DOCUSATE SODIUM 100 MG/1
100 CAPSULE, LIQUID FILLED ORAL 2 TIMES DAILY
Qty: 100 CAPSULE | Refills: 0 | Status: SHIPPED | OUTPATIENT
Start: 2020-02-27

## 2020-02-27 RX ORDER — DIAPER,BRIEF,INFANT-TODD,DISP
EACH MISCELLANEOUS ONCE
Status: DISCONTINUED | OUTPATIENT
Start: 2020-02-27 | End: 2020-02-27

## 2020-02-27 RX ORDER — LIDOCAINE 50 MG/G
OINTMENT TOPICAL
Qty: 1 TUBE | Refills: 0 | Status: SHIPPED | OUTPATIENT
Start: 2020-02-27 | End: 2020-08-11

## 2020-02-27 RX ORDER — METRONIDAZOLE 500 MG/1
500 TABLET ORAL 2 TIMES DAILY
Qty: 14 TABLET | Refills: 0 | Status: SHIPPED | OUTPATIENT
Start: 2020-02-27 | End: 2020-03-05

## 2020-02-27 RX ORDER — LEVOFLOXACIN 750 MG/1
750 TABLET ORAL DAILY
Qty: 7 TABLET | Refills: 0 | Status: SHIPPED | OUTPATIENT
Start: 2020-02-27 | End: 2020-03-05

## 2020-02-27 RX ORDER — LEVOFLOXACIN 500 MG/1
750 TABLET, FILM COATED ORAL DAILY
Status: DISCONTINUED | OUTPATIENT
Start: 2020-02-27 | End: 2020-02-27 | Stop reason: HOSPADM

## 2020-02-27 RX ADMIN — LEVOFLOXACIN 750 MG: 500 TABLET, FILM COATED ORAL at 09:41

## 2020-02-27 RX ADMIN — METRONIDAZOLE 500 MG: 250 TABLET ORAL at 09:41

## 2020-02-27 ASSESSMENT — PAIN SCALES - GENERAL: PAINLEVEL_OUTOF10: 3

## 2020-02-27 NOTE — ED PROVIDER NOTES
Triage Chief Complaint:   Hemorrhoids (since Tuesday afternoon, progressively worsening) and Fever    [place]:  Tenny Kayser is a 75 y.o. male that presents with concern for hemorrhoid. Patient reports that he has noted some pain and swelling in his rectal region on Tuesday that has slowly gotten worse. This morning patient reports that there was drainage from this area that was malodorous. Some small amount of blood also noted. Patient reports that he does have intermittent constipation but has been moving his bowels recently. Patient has felt intermittent fevers as well. Patient reports this is his second episode of a hemorrhoid. Patient is diabetic but with well-controlled blood sugar. Patient reports his last A1c was in the 5 range. ROS:  General:  + fevers, no chills, no weakness  Eyes:  No recent vison changes, no discharge  ENT:  No sore throat, no nasal congestion, no hearing changes  Cardiovascular:  No chest pain, no palpitations  Respiratory:  No shortness of breath, no cough, no wheezing  Gastrointestinal:  No pain, no nausea, no vomiting, no diarrhea, + anal pain and swelling, + drainage  Musculoskeletal:  No muscle pain, no joint pain  Skin:  No rash, no pruritis, no easy bruising  Neurologic:  No speech problems, no headache, no extremity numbness, no extremity tingling, no extremity weakness  Psychiatric:  No anxiety  Genitourinary:  No dysuria, no hematuria  Endocrine:  No unexpected weight gain, no unexpected weight loss  Extremities:  no edema, no pain    Past Medical History:   Diagnosis Date    Angina at rest [facility])     Arthritis     \"Hands, Knees, Shoulders\"    CAD (coronary artery disease)     Sees Dr. Marcello Engle Carotid  Doppler 03/13/2017    Duplex sonography with color flow enhancement was performed bilaterally on the cervical carotid system. No evidence of hemodynamically significant stenosis in the bilateral internal carotid arteries. There is evidence of right vertebral flow, left vertebral flow non visualized    Hyperlipidemia     Hypertension     Kidney stone 1990's    Kidney Stone Surgery    S/P CABG x 4 2/8/2013 2/8/2013-LIMA to LAD; VG to OM;VG to Diagonal; VG to RCA -Dr Tramaine Salmeron- report in epic    Shingles Patient states that he started Valtrex for this 6/30/14.     Shortness of breath on exertion     Wears glasses      Past Surgical History:   Procedure Laterality Date    CARDIAC CATHETERIZATION  2/5/2013 2/5/2013-Severe 3 vessel disease- Recomm CABG- report in epic   Aasa [address]  2-8-13    CABG (4 Bypasses)    COLONOSCOPY  Last Done 10-8-15    CORONARY ANGIOPLASTY  11-10    One Heart Stent    CORONARY ANGIOPLASTY  1-11-11    Two Heart Stents    CORONARY ARTERY BYPASS GRAFT  2/8/2013 2/8/2013- CABG X 4-LIMA to LAD; VG to OM;VG to Diagonal; VG to RCA -Dr Vincent Klein      All Teeth Extracted In Past    ENDOSCOPY, COLON, DIAGNOSTIC  03/28/2017    normal esophagus, gastritis found, moderate amount of food particles in the stomach    INGUINAL HERNIA REPAIR Left [address]  1990's    KNEE ARTHROSCOPY Left 5/26/16    Partial medial and lateral meniscectomy & chondroplasty     Family History   Problem Relation Age of Onset    Heart Disease Mother         Heart Attack    Early Death Mother         Heart Attack    Early Death Brother         64 Or 62    Substance Abuse Brother         Alcoholic    Early Death Father 48        \"He Drank Himself To Death\"    Substance Abuse Father         Alcohol    Cirrhosis Father     Early Death Sister 2        Leukemia    Cancer Sister         Leukemia    Early Death Sister 61        Stroke    Stroke Sister     Arthritis Sister     Heart Disease Sister     Vision Loss Sister         \"RP\"     Social History     Socioeconomic History    Marital status:      Spouse name: Not on file    Number of children: Not on file    Years of education: Not on file daily for 7 days 7 tablet 0    metroNIDAZOLE (FLAGYL) 500 MG tablet Take 1 tablet by mouth 2 times daily for 7 days 14 tablet 0    hydrocortisone (ANUSOL-HC) 2.5 % rectal cream Place rectally 2 times daily. 1 Tube 0    TERAZOSIN HCL PO Take by mouth      isosorbide mononitrate (IMDUR) 30 MG extended release tablet Take 1 tablet by mouth 2 times daily 60 tablet 5    amLODIPine (NORVASC) 5 MG tablet Take 5 mg by mouth daily      atorvastatin (LIPITOR) 10 MG tablet Take 1 tablet by mouth daily 90 tablet 3    lisinopril (PRINIVIL;ZESTRIL) 40 MG tablet Take 1 tablet by mouth daily 90 tablet 3    hydrochlorothiazide (HYDRODIURIL) 25 MG tablet   5    BASAGLAR KWIKPEN 100 UNIT/ML injection pen   6    metFORMIN (GLUCOPHAGE) 1000 MG tablet Take 1,000 mg by mouth 2 times daily (with meals)      aspirin EC 81 MG EC tablet Take 1 tablet by mouth daily 90 tablet 3    carvedilol (COREG) 25 MG tablet Take 1 tablet by mouth 2 times daily 180 tablet 3    oxyCODONE-acetaminophen (PERCOCET) 5-325 MG per tablet Take 1 tablet by mouth every 4 hours as needed for Pain 20 tablet 0    Insulin Aspart (NOVOLOG FLEXPEN SC) Inject 20 Units into the skin Per Sliding Scale        Allergies   Allergen Reactions    Pcn [Penicillins] Hives, Itching and Rash    Morphine Itching       Nursing Notes Reviewed    Physical Exam:  ED Triage Vitals [02/27/20 0902]   Enc Vitals Group      BP (S) (!) 150/81      Pulse 87      Resp 18      Temp 98.9 °F (37.2 °C)      Temp Source Oral      SpO2 95 %      Weight 200 lb (90.7 kg)      Height 5' 6\" (1.676 m)      Head Circumference       Peak Flow       Pain Score       Pain Loc       Pain Edu? Excl. in [address]? My pulse ox interpretation is - normal    General appearance:  No acute distress. Appears overall very well. Pleasant. Skin:  Warm. Dry. Pinhole area of drainage of slightly malodorous serous sanguinous output at the 6:00 region with no underlying fluctuance.   Very scant amount of expressible drainage. Some small surrounding erythema without significant induration. No perineal involvement. No crepitance. Eye:  Extraocular movements intact. Ears, nose, mouth and throat:  Oral mucosa moist   Neck:  Trachea midline. Extremity:  No swelling. Normal ROM     Heart:  Regular rate and rhythm, normal S1 & S2, no extra heart sounds. Perfusion:  intact  Respiratory:  Lungs clear to auscultation bilaterally. Respirations nonlabored. Abdominal:  Normal bowel sounds. Soft. Nontender. Non distended. Rectal: Approximately 1 x 1 x 1 cm external hemorrhoid at the 3:00 position that is nonthrombosed and is soft without active bleeding. Additional skin findings as above. Back:  No CVA tenderness to palpation     Neurological:  Alert and oriented times 3. No focal neuro deficits. Psychiatric:  Appropriate    I have reviewed and interpreted all of the currently available lab results from this visit (if applicable):  No results found for this visit on 02/27/20. Radiographs (if obtained):  [] The following radiograph was interpreted by myself in the absence of a radiologist:   [] Radiologist's Report Reviewed:  No orders to display         EKG (if obtained): (All EKG's are interpreted by myself in the absence of a cardiologist)    Chart review shows recent radiographs:  No results found. MDM:  Pt presents as above. Emergent conditions considered. Presentation prompted initial history and physical as above. Patient covered with Levaquin and Flagyl for likely infectious perirectal process. Patient also treated for an external hemorrhoid with lidocaine ointment and Preparation H.  I did discuss the case with on-call general surgeon. I spoke with Dr. Sukhwinder Bailey who is able to see the patient tomorrow in his office. I will have patient on a course of Levaquin and Flagyl until then.   I did discuss the need for very close attention to this area and for any worsening symptoms on when to return to the emergency department especially spreading of infection into the area between legs. Symptomatic treatment with Colace, Preparation H and lidocaine ointment provided. Additionally, discussed sitz baths. Patient is understanding and is planning on calling for follow-up when he gets home. Questions sought and answered with the patient. They voice understanding and agree with plan. Instructed to return for any worsening or worrisome concerns. Clinical Impression:  1. External hemorrhoid    2. Perianal abscess      Disposition referral (if applicable): Josias Shaffer MD  [facility]  Bariatric office  [address] [facility]  [phone]    Schedule an appointment as soon as possible for a visit   [address] [place] [address]  [phone]    Schedule an appointment as soon as possible for a visit       [facility] Emergency Department  [address] [phone]  Today  If symptoms worsen (ESPECIALLY ANY PROGRESSION OF PAIN OR REDNESS/SWELLING INTO THE AREA BETWEEN THE LEGS)    Disposition medications (if applicable):  New Prescriptions    DOCUSATE SODIUM (COLACE) 100 MG CAPSULE    Take 1 capsule by mouth 2 times daily    HYDROCORTISONE (ANUSOL-HC) 2.5 % RECTAL CREAM    Place rectally 2 times daily. LEVOFLOXACIN (LEVAQUIN) 750 MG TABLET    Take 1 tablet by mouth daily for 7 days    LIDOCAINE (XYLOCAINE) 5 % OINTMENT    Apply topically as needed. METRONIDAZOLE (FLAGYL) 500 MG TABLET    Take 1 tablet by mouth 2 times daily for 7 days       Comment: Please note this report has been produced using speech recognition software and may contain errors related to that system including errors in grammar, punctuation, and spelling, as well as words and phrases that may be inappropriate.  If there are any questions or concerns please feel free to contact the dictating provider for clarification.        Elio Schulz MD  02/27/20 8525

## 2020-02-28 ENCOUNTER — OFFICE VISIT (OUTPATIENT)
Dept: SURGERY | Age: 65
End: 2020-02-28
Payer: MEDICARE

## 2020-02-28 VITALS
WEIGHT: 203.4 LBS | HEIGHT: 66 IN | DIASTOLIC BLOOD PRESSURE: 70 MMHG | SYSTOLIC BLOOD PRESSURE: 130 MMHG | BODY MASS INDEX: 32.69 KG/M2 | OXYGEN SATURATION: 97 % | HEART RATE: 74 BPM

## 2020-02-28 PROCEDURE — 99203 OFFICE O/P NEW LOW 30 MIN: CPT | Performed by: SURGERY

## 2020-02-28 RX ORDER — HYDROCORTISONE ACETATE 25 MG/1
25 SUPPOSITORY RECTAL 2 TIMES DAILY PRN
Qty: 24 SUPPOSITORY | Refills: 0 | Status: SHIPPED | OUTPATIENT
Start: 2020-02-28 | End: 2020-08-11

## 2020-02-28 RX ORDER — LIDOCAINE HYDROCHLORIDE 30 MG/G
CREAM TOPICAL
Qty: 28 G | Refills: 0 | Status: SHIPPED | OUTPATIENT
Start: 2020-02-28 | End: 2020-08-11

## 2020-02-28 ASSESSMENT — ENCOUNTER SYMPTOMS
EYE REDNESS: 0
RECTAL PAIN: 1
CHEST TIGHTNESS: 0
ABDOMINAL DISTENTION: 0
EYE DISCHARGE: 0
DIARRHEA: 0
CONSTIPATION: 0
ABDOMINAL PAIN: 0
VOMITING: 0
ANAL BLEEDING: 1
NAUSEA: 0
SHORTNESS OF BREATH: 0
SORE THROAT: 0
COLOR CHANGE: 0

## 2020-02-28 NOTE — PROGRESS NOTES
GENERAL SURGERY OUTPATIENT HISTORY & PHYSICAL NOTE  [facility]    PATIENT: Corinne Molt, 1955, 75 y.o., male  MRN: [id number]    Physician: Alvaro Arenas MD    Date: 2/28/20    Reason for Evaluation:     Chief Complaint   Patient presents with    New Patient     External hemorrhoid, ED2/2/2020       Requesting Physician:  ED referral     CHIEF COMPLAINT:     Chief Complaint   Patient presents with    New Patient     External hemorrhoid, ED2/2/2020       History Obtained From:  patient, electronic medical record    HISTORY OF PRESENT ILLNESS:    Haritha Dumont is a 75 y.o. male presenting with concern for hemorrhoids. He started having pain on Tuesday and swelling. He has had hemorrhoids for at least several years, but rarely flare up. This is the 2nd time he has had a bad flare. Usually Preparation H helps some, but not working as well this time. He was seen in the ED yesterday and given antibiotics. Location: perirectal  Quality, Severity: moderate  · Pain is described as aching, dull and pressure-like  Timing, Duration: since Tuesday (about 3 days)  Context, Modifying Factors: Sitz baths help some, denies worsening factors  Associated Signs & Symptoms: denies pain with BM, denies constipation - has daily soft BM, occasional bleeding from the hemorrhoids    He did a cologuard test about a month ago with Dr. Meaghan Marks at [facility] - hasn't gotten results yet. Last colonoscopy about 6-8 years ago, he reports no findings. No FH of colon cancer. He had a hemorrhoid banding years ago. No surgical procedures for the hemorrhoids.       Past Medical History:    Past Medical History:   Diagnosis Date    Angina at rest [facility])     Arthritis     \"Hands, Knees, Shoulders\"    CAD (coronary artery disease)     Sees Dr. Nails Corpus Carotid  Doppler 03/13/2017    Duplex sonography with color flow enhancement was performed bilaterally on the cervical Mild (0-49%) disease of the bilateral internal carotid, significant stenosis of the bilateral external carotid arteries L>R, Normal right vertebral flow, left vertebral flow non visualized    Hyperlipidemia     Hypertension     Kidney stone 1990's    Kidney Stone Surgery    S/P CABG x 4 2/8/2013 2/8/2013-LIMA to LAD; VG to OM;VG to Diagonal; VG to RCA -Dr Davon Guillory- report in epic    Shingles Patient states that he started Valtrex for this 6/30/14.  Shortness of breath on exertion     Wears glasses        Past Surgical History:    Past Surgical History:   Procedure Laterality Date    CARDIAC CATHETERIZATION  2/5/2013 2/5/2013-Severe 3 vessel disease- Recomm CABG- report in epic   [address]  2-8-13    CABG (4 Bypasses)    COLONOSCOPY  Last Done 10-8-15    CORONARY ANGIOPLASTY  11-10    One Heart Stent    CORONARY ANGIOPLASTY  1-11-11    Two Heart Stents    CORONARY ARTERY BYPASS GRAFT  2/8/2013 2/8/2013- CABG X 4-LIMA to LAD; VG to OM;VG to Diagonal; VG to RCA -Dr Leila Byers      All Teeth Extracted In Past    ENDOSCOPY, COLON, DIAGNOSTIC  03/28/2017    normal esophagus, gastritis found, moderate amount of food particles in the stomach    INGUINAL HERNIA REPAIR Left 1971    KIDNEY STONE SURGERY  1990's    KNEE ARTHROSCOPY Left 5/26/16    Partial medial and lateral meniscectomy & chondroplasty       Current Medications:   Current Outpatient Medications   Medication Sig Dispense Refill    hydrocortisone (ANUSOL-HC) 25 MG suppository Place 1 suppository rectally 2 times daily as needed for Hemorrhoids 24 suppository 0    lidocaine (LIDAMANTLE) 3 % CREA Apply small amount topically to anal area three times daily as needed for pain. 28 g 0    docusate sodium (COLACE) 100 MG capsule Take 1 capsule by mouth 2 times daily 100 capsule 0    lidocaine (XYLOCAINE) 5 % ointment Apply topically as needed.  1 Tube 0    levoFLOXacin (LEVAQUIN) 750 MG tablet Take 1 tablet by mouth daily for 7 days 7 tablet 0    metroNIDAZOLE (FLAGYL) 500 MG tablet Take 1 tablet by mouth 2 times daily for 7 days 14 tablet 0    hydrocortisone (ANUSOL-HC) 2.5 % rectal cream Place rectally 2 times daily. 1 Tube 0    TERAZOSIN HCL PO Take by mouth      isosorbide mononitrate (IMDUR) 30 MG extended release tablet Take 1 tablet by mouth 2 times daily 60 tablet 5    amLODIPine (NORVASC) 5 MG tablet Take 5 mg by mouth daily      atorvastatin (LIPITOR) 10 MG tablet Take 1 tablet by mouth daily 90 tablet 3    lisinopril (PRINIVIL;ZESTRIL) 40 MG tablet Take 1 tablet by mouth daily 90 tablet 3    hydrochlorothiazide (HYDRODIURIL) 25 MG tablet   5    BASAGLAR KWIKPEN 100 UNIT/ML injection pen   6    metFORMIN (GLUCOPHAGE) 1000 MG tablet Take 1,000 mg by mouth 2 times daily (with meals)      aspirin EC 81 MG EC tablet Take 1 tablet by mouth daily 90 tablet 3    carvedilol (COREG) 25 MG tablet Take 1 tablet by mouth 2 times daily 180 tablet 3    oxyCODONE-acetaminophen (PERCOCET) 5-325 MG per tablet Take 1 tablet by mouth every 4 hours as needed for Pain 20 tablet 0    Insulin Aspart (NOVOLOG FLEXPEN SC) Inject 20 Units into the skin Per Sliding Scale        No current facility-administered medications for this visit.         Allergies:  Pcn [penicillins] and Morphine    Social History:   Social History     Socioeconomic History    Marital status:      Spouse name: Not on file    Number of children: Not on file    Years of education: Not on file    Highest education level: Not on file   Occupational History    Not on file   Social Needs    Financial resource strain: Not on file    Food insecurity:     Worry: Not on file     Inability: Not on file    Transportation needs:     Medical: Not on file     Non-medical: Not on file   Tobacco Use    Smoking status: Never Smoker    Smokeless tobacco: Never Used   Substance and Sexual Activity    Alcohol use: No     Alcohol/week: 0.0 standard drinks    Drug use: Yes     Types: Marijuana     Comment: \"Use Marijuana About Every Other Day\"last 3-27-17    Sexual activity: Never   Lifestyle    Physical activity:     Days per week: Not on file     Minutes per session: Not on file    Stress: Not on file   Relationships    Social connections:     Talks on phone: Not on file     Gets together: Not on file     Attends [employer] service: Not on file     Active member of club or organization: Not on file     Attends meetings of clubs or organizations: Not on file     Relationship status: Not on file    Intimate partner violence:     Fear of current or ex partner: Not on file     Emotionally abused: Not on file     Physically abused: Not on file     Forced sexual activity: Not on file   Other Topics Concern    Not on file   Social History Narrative    Not on file       Family History:   Family History   Problem Relation Age of Onset    Heart Disease Mother         Heart Attack    Early Death Mother         Heart Attack    Early Death Brother         64 Or 62    Substance Abuse Brother         Alcoholic    Early Death Father 48        \"He Drank Himself To Death\"    Substance Abuse Father         Alcohol    Cirrhosis Father     Early Death Sister 2        Leukemia    Cancer Sister         Leukemia    Early Death Sister 61        Stroke    Stroke Sister     Arthritis Sister     Heart Disease Sister     Vision Loss Sister         \"RP\"       REVIEW OF SYSTEMS:    Review of Systems   Constitutional: Positive for diaphoresis (yesterday, better today). Negative for chills and fever. HENT: Negative for congestion and sore throat. Eyes: Negative for discharge and redness. Respiratory: Negative for chest tightness and shortness of breath. Cardiovascular: Negative for chest pain and palpitations. Gastrointestinal: Positive for anal bleeding and rectal pain. Negative for abdominal distention, abdominal pain, constipation, diarrhea, nausea and vomiting. 44.8 02/12/2017     02/12/2017     02/11/2017    K 4.3 02/11/2017    CL 97 (L) 02/11/2017    CO2 27 02/11/2017    BUN 11 02/11/2017    CREATININE 0.7 (L) 02/11/2017    GLUCOSE 189 02/10/2017    CALCIUM 9.3 02/11/2017    PROT 6.6 02/11/2017    BILITOT 0.4 02/11/2017    AST 15 02/11/2017    ALT 16 02/11/2017    ALKPHOS 85 02/11/2017    INR 1.04 02/08/2013    GLUF 315 (H) 02/11/2017    LABA1C 7.9 (H) 02/11/2017       Imaging:   No results found. Pertinent laboratory and imaging studies were personally reviewed if available. IMPRESSION:    Leila Pike is a 75 y.o. male with external hemorrhoids, findings suggestive of recent spontaneous drainage of a thrombosed hemorrhoid    1. Grade II hemorrhoids      Patient Active Problem List    Diagnosis Date Noted    Grade II hemorrhoids 03/01/2020    PVD (peripheral vascular disease) ([facility] 10/22/2019    Neuropathy 10/22/2019    Hypertensive emergency 02/11/2017    Chondromalacia of medial tibial plateau 91/75/4499    Lateral meniscus tear 05/13/2016    Acute medial meniscal injury of knee 04/04/2016    H/O echocardiogram 03/08/2013    History of PTCA     Hypertension     Kidney stone     Diabetes mellitus ([facility]     History of shingles     Hyperlipidemia     S/P CABG x 4 02/08/2013    CAD (coronary artery disease) 02/03/2013    Obesity 02/03/2013     PLAN:  · Discussed options with the patient. Would recommend a trial of non-operative management for his hemorrhoids since they are infrequently symptomatic. · Anusol and lidocaine cream PRN. Sitz baths. · Avoid constipation - daily bowel regimen  Follow Up: Return in about 4 weeks (around 3/27/2020) for for re-check. No orders of the defined types were placed in this encounter.        Orders Placed This Encounter   Medications    hydrocortisone (ANUSOL-HC) 25 MG suppository     Sig: Place 1 suppository rectally 2 times daily as needed for Hemorrhoids     Dispense:  24 suppository Refill:  0    lidocaine (LIDAMANTLE) 3 % CREA     Sig: Apply small amount topically to anal area three times daily as needed for pain.      Dispense:  28 g     Refill:  0   ·       Electronically signed by Quinton Bocanegra MD, 3/1/2020, 6:13 PM

## 2020-03-01 PROBLEM — K64.1 GRADE II HEMORRHOIDS: Status: ACTIVE | Noted: 2020-03-01

## 2020-03-03 ENCOUNTER — TELEPHONE (OUTPATIENT)
Dept: CARDIOLOGY CLINIC | Age: 65
End: 2020-03-03

## 2020-03-03 ENCOUNTER — HOSPITAL ENCOUNTER (OUTPATIENT)
Age: 65
Discharge: HOME | End: 2020-03-03
Payer: MEDICARE

## 2020-03-03 LAB
CHOLESTEROL: 85 MG/DL
HDLC SERPL-MCNC: 52 MG/DL
LDL CHOLESTEROL DIRECT: 33 MG/DL
TRIGL SERPL-MCNC: 54 MG/DL

## 2020-03-03 PROCEDURE — 80061 LIPID PANEL: CPT

## 2020-03-03 PROCEDURE — 36415 COLL VENOUS BLD VENIPUNCTURE: CPT

## 2020-03-03 PROCEDURE — 83721 ASSAY OF BLOOD LIPOPROTEIN: CPT

## 2020-03-05 ENCOUNTER — PROCEDURE VISIT (OUTPATIENT)
Dept: CARDIOLOGY CLINIC | Age: 65
End: 2020-03-05
Payer: MEDICARE

## 2020-03-05 ENCOUNTER — TELEPHONE (OUTPATIENT)
Dept: CARDIOLOGY CLINIC | Age: 65
End: 2020-03-05

## 2020-03-05 PROCEDURE — 93015 CV STRESS TEST SUPVJ I&R: CPT | Performed by: INTERNAL MEDICINE

## 2020-03-05 NOTE — TELEPHONE ENCOUNTER
Called patient to schedule treadmill. Authorization not required.  Weight 204 lbs Patient will need to hold Coreg

## 2020-07-17 ENCOUNTER — HOSPITAL ENCOUNTER (OUTPATIENT)
Age: 65
Discharge: HOME | End: 2020-07-17
Payer: MEDICARE

## 2020-07-17 ENCOUNTER — HOSPITAL ENCOUNTER (OUTPATIENT)
Dept: GENERAL RADIOLOGY | Age: 65
Discharge: HOME | End: 2020-07-17
Payer: MEDICARE

## 2020-07-17 PROCEDURE — 73630 X-RAY EXAM OF FOOT: CPT

## 2020-08-11 ENCOUNTER — VIRTUAL VISIT (OUTPATIENT)
Dept: CARDIOLOGY CLINIC | Age: 65
End: 2020-08-11
Payer: MEDICARE

## 2020-08-11 PROCEDURE — 99213 OFFICE O/P EST LOW 20 MIN: CPT | Performed by: INTERNAL MEDICINE

## 2020-08-11 NOTE — LETTER
[facility]  [address] [address]  Phone: [phone]  Fax: [phone]    Morelia Wang MD        August 11, 2020     Jose [address] [place] [address]    Patient: Kerry Mckeon  MR Number: [id number]  YOB: 1955  Date of Visit: 8/11/2020    Dear Dr. Garcia:    Thank you for the request for consultation for Stephanie Winters to me for the evaluation of CAD. Below are the relevant portions of my assessment and plan of care. If you have questions, please do not hesitate to call me. I look forward to following Rivka Higgins along with you.     Sincerely,        Morelia Wang MD

## 2020-08-11 NOTE — PROGRESS NOTES
OFFICE PROGRESS NOTES      Ashleigh Fowler is a 75 y.o. male who has    CHIEF COMPLAINT AS FOLLOWS:  CHEST PAIN: Patient denies any C/O chest pains at this time. SOB: No C/O SOB at this time. LEG EDEMA: No leg edema   PALPITATIONS: Denies any C/O Palpitations   DIZZINESS: No C/O Dizziness   SYNCOPE: None   OTHER:                                                               HPI: Patient is here for F/U on his CAD, HTN & Dyslipidemia problems. He does not have any complaints at this time.     Piter Khan has the following history recorded in care path:  Patient Active Problem List    Diagnosis Date Noted    Hypertensive emergency 02/11/2017     Priority: Low    Chondromalacia of medial tibial plateau 11/34/1003     Priority: Low    Lateral meniscus tear 05/13/2016     Priority: Low    Acute medial meniscal injury of knee 04/04/2016     Priority: Low    H/O echocardiogram 03/08/2013     Priority: Low    History of PTCA      Priority: Low    Hypertension      Priority: Low    Kidney stone      Priority: Low    Diabetes mellitus ([facility] [address]      Priority: Low    History of shingles      Priority: Low    Hyperlipidemia      Priority: Low    S/P CABG x 4 02/08/2013     Priority: Low    CAD (coronary artery disease) 02/03/2013     Priority: Low    Obesity 02/03/2013     Priority: Low    Grade II hemorrhoids 03/01/2020    PVD (peripheral vascular disease) (HCC) 10/22/2019    Neuropathy 10/22/2019     Current Outpatient Medications   Medication Sig Dispense Refill    docusate sodium (COLACE) 100 MG capsule Take 1 capsule by mouth 2 times daily 100 capsule 0    TERAZOSIN HCL PO Take by mouth      amLODIPine (NORVASC) 5 MG tablet Take 5 mg by mouth daily      atorvastatin (LIPITOR) 10 MG tablet Take 1 tablet by mouth daily 90 tablet 3    lisinopril (PRINIVIL;ZESTRIL) 40 MG tablet Take 1 tablet by mouth daily 90 tablet 3    hydrochlorothiazide (HYDRODIURIL) 25 MG tablet   5    BASAGLAR KWIKPEN 100 UNIT/ML injection pen   6    metFORMIN (GLUCOPHAGE) 1000 MG tablet Take 1,000 mg by mouth 2 times daily (with meals)      aspirin EC 81 MG EC tablet Take 1 tablet by mouth daily 90 tablet 3    carvedilol (COREG) 25 MG tablet Take 1 tablet by mouth 2 times daily 180 tablet 3    oxyCODONE-acetaminophen (PERCOCET) 5-325 MG per tablet Take 1 tablet by mouth every 4 hours as needed for Pain 20 tablet 0    Insulin Aspart (NOVOLOG FLEXPEN SC) Inject 20 Units into the skin Per Sliding Scale       isosorbide mononitrate (IMDUR) 30 MG extended release tablet Take 1 tablet by mouth 2 times daily (Patient not taking: Reported on 8/11/2020) 60 tablet 5     No current facility-administered medications for this visit. Allergies: Pcn [penicillins] and Morphine  Past Medical History:   Diagnosis Date    Angina at rest [facility])     Arthritis     \"Hands, Knees, Shoulders\"    CAD (coronary artery disease)     Sees Dr. Infante Seek Carotid  Doppler 03/13/2017    Duplex sonography with color flow enhancement was performed bilaterally on the cervical carotid system. No evidence of hemodynamically significant stenosis in the bilateral internal carotid arteries. There is evidence of hemodynamically significant stenosis of the bilateral external carotid arteries, L > R.    Cellulitis 2/2/2013    5th finger-Left hand-ER visit -report in Epic    Chronic back pain     Diabetes mellitus ([facility] [address] Dx 2007    Fractured rib Dx 9-13    Full dentures     Gonorrhea Dx 18's    \"Had Treatment\"    H/O cardiac catheterization 2/5/2013 2/5/2013-Severe 3 vessel CAD-recomm CABG- Dr Madhavi Marin - report in epic    H/O cardiovascular stress test 2/4/2013 2/4/2013-mod ischemia inferior wall. EF 53%.  LVSF normal-Dr Tomas Kitchen - report in epic    H/O cardiovascular stress test 1/16/2015    treadmill    H/O Doppler ultrasound 2/6/2013    CAROTID DOPPLER-2/6/2013-There  CARDIAC SURGERY  2-8-13    CABG (4 Bypasses)    COLONOSCOPY  Last Done 10-8-15    CORONARY ANGIOPLASTY  11-10    One Heart Stent    CORONARY ANGIOPLASTY  1-11-11    Two Heart Stents    CORONARY ARTERY BYPASS GRAFT  2/8/2013 2/8/2013- CABG X 4-LIMA to LAD; VG to OM;VG to Diagonal; VG to RCA -Dr Michelle Adamson      All Teeth Extracted In Past    ENDOSCOPY, COLON, DIAGNOSTIC  03/28/2017    normal esophagus, gastritis found, moderate amount of food particles in the stomach    INGUINAL HERNIA REPAIR Left [address]  1990's    KNEE ARTHROSCOPY Left 5/26/16    Partial medial and lateral meniscectomy & chondroplasty      As reviewed   Family History   Problem Relation Age of Onset    Heart Disease Mother         Heart Attack    Early Death Mother         Heart Attack    Early Death Brother         64 Or 62    Substance Abuse Brother         Alcoholic    Early Death Father 48        \"He Drank Himself To Death\"    Substance Abuse Father         Alcohol    Cirrhosis Father     Early Death Sister 2        Leukemia    Cancer Sister         Leukemia    Early Death Sister 61        Stroke    Stroke Sister     Arthritis Sister     Heart Disease Sister     Vision Loss Sister         \"RP\"     Social History     Tobacco Use    Smoking status: Never Smoker    Smokeless tobacco: Never Used   Substance Use Topics    Alcohol use: No     Alcohol/week: 0.0 standard drinks      Review of Systems:    Constitutional: Negative for diaphoresis and fatigue  Psychological:Negative for anxiety or depression  HENT: Negative for headaches, nasal congestion, sinus pain or vertigo  Eyes: Negative for visual disturbance.    Endocrine: Negative for polydipsia/polyuria  Respiratory: Negative for shortness of breath  Cardiovascular: Negative for chest pain, dyspnea on exertion, claudication, edema, irregular heartbeat, murmur, palpitations or shortness of breath  Gastrointestinal: Negative for 02/10/2017    K 4.3 02/11/2017    K 4.1 02/10/2017    CL 97 02/11/2017    CL 97 02/10/2017    CO2 27 02/11/2017    CO2 27 02/10/2017    BUN 11 02/11/2017    BUN 14 02/10/2017    CREATININE 0.7 02/11/2017    CREATININE 1.0 02/10/2017     CMP:   Lab Results   Component Value Date     02/11/2017     02/10/2017    K 4.3 02/11/2017    K 4.1 02/10/2017    CL 97 02/11/2017    CL 97 02/10/2017    CO2 27 02/11/2017    CO2 27 02/10/2017    BUN 11 02/11/2017    BUN 14 02/10/2017    PROT 6.6 02/11/2017    PROT 7.4 02/10/2017    PROT 7.7 03/06/2013    PROT 6.9 02/02/2013     TSH:  No results found for: TSH, TSHHS    QUALITY MEASURES REVIEWED:  1.CAD:Patient is taking anti platelet agent:Yes  2. DYSLIPIDEMIA: Patient is on cholesterol lowering medication:Yes  3. Beta-Blocker therapy for CAD, if prior Myocardial Infarction:Yes  4. Atrial fibrillation & anticoagulation therapy No  5. Discussed weight management strategies. Impression:    No diagnosis found. Patient Active Problem List   Diagnosis Code    CAD (coronary artery disease) I25.10    Obesity E66.9    S/P CABG x 4 Z95.1    History of PTCA Z98.61    Hypertension I10    Kidney stone N20.0    Diabetes mellitus ([facility] [address] E11.9    History of shingles Z86.19    Hyperlipidemia E78.5    H/O echocardiogram Z92.89    Acute medial meniscal injury of knee S83.8X9A    Lateral meniscus tear S83.289A    Chondromalacia of medial tibial plateau U43.486    Hypertensive emergency I16.1    PVD (peripheral vascular disease) (HCC) I73.9    Neuropathy G62.9    Grade II hemorrhoids K64.1       Assessment & Plan:    being evaluated by a  visit encounter to address concerns as mentioned above. A caregiver was present when appropriate. Due to this being a TeleHealth encounter (During AOODG-82 public health emergency), evaluation of the following organ systems was limited: Vitals/Constitutional/EENT/Resp/CV/GI//MS/Neuro/Skin/Heme-Lymph-Imm.   Pursuant to the emergency declaration under the [facility] authority and the [employer] Act, this Virtual Visit was conducted with patient's (and/or legal guardian's) consent, to reduce the patient's risk of exposure to COVID-19 and provide necessary medical care. The patient (and/or legal guardian) has also been advised to contact this office for worsening conditions or problems, and seek emergency medical treatment and/or call 911 if deemed necessary. Time spent during this visit 11 min    CAD:Yes   clinically stable. Patient is on optimal medical regimen ( see medication list above )  -     CORONARY ARTERY DISEASE: Patient is currently  asymptomatic from CAD. - changes in  treatment:   no           - Testing ordered:  no  [facility] classification: 1  [place] RISK SCORE:  PATTIE RISK SCORE:  HTN:well controlled on current medical regimen, see list above. - changes in  treatment:   no   CARDIOMYOPATHY: None known   CONGESTIVE HEART FAILURE: NO KNOWN HISTORY.      VHD: No significant VHD noted  DYSLIPIDEMIA: Patient's profile is at / near Goal.yes,                                 HDL is low                                Tolerating current medical regimen wellyes,                                                               See most recent Lab values in Labs section above. Diabetes mellitis: .yes,                                      Managed by family MD                                     BS under good control yes,                                      Hgb A1c avilable yes,   CAROTID ARTERY DISEASE:.yes, mild               No symptoms described pertaining to Carotid artery disease               Up to date on non invasive testing .yes,                Patient is on Guidelines recommended therapy. yes,   OTHER RELEVANT DIAGNOSIS:as noted in patient's active problem list:  TESTS ORDERED: None this visit All previously ordered tests reviewed. ARRHYTHMIAS: None known   MEDICATIONS: CPM   Office f/u in six months.      Primary/secondary prevention is the goal by aggressive risk modification, healthy and therapeutic life style changes for cardiovascular risk reduction. Various goals are discussed and multiple questions answered.

## 2021-02-24 ENCOUNTER — OFFICE VISIT (OUTPATIENT)
Dept: CARDIOLOGY CLINIC | Age: 66
End: 2021-02-24
Payer: MEDICARE

## 2021-02-24 VITALS
DIASTOLIC BLOOD PRESSURE: 80 MMHG | HEART RATE: 64 BPM | SYSTOLIC BLOOD PRESSURE: 130 MMHG | HEIGHT: 66 IN | BODY MASS INDEX: 34.68 KG/M2 | WEIGHT: 215.8 LBS

## 2021-02-24 DIAGNOSIS — I73.9 PVD (PERIPHERAL VASCULAR DISEASE) (HCC): ICD-10-CM

## 2021-02-24 DIAGNOSIS — I10 ESSENTIAL HYPERTENSION: ICD-10-CM

## 2021-02-24 DIAGNOSIS — E66.09 CLASS 2 OBESITY DUE TO EXCESS CALORIES WITHOUT SERIOUS COMORBIDITY WITH BODY MASS INDEX (BMI) OF 35.0 TO 35.9 IN ADULT: ICD-10-CM

## 2021-02-24 DIAGNOSIS — E11.59 TYPE 2 DIABETES MELLITUS WITH OTHER CIRCULATORY COMPLICATION, UNSPECIFIED WHETHER LONG TERM INSULIN USE (HCC): ICD-10-CM

## 2021-02-24 DIAGNOSIS — E78.5 HYPERLIPIDEMIA, UNSPECIFIED HYPERLIPIDEMIA TYPE: ICD-10-CM

## 2021-02-24 DIAGNOSIS — Z95.1 S/P CABG X 4: ICD-10-CM

## 2021-02-24 DIAGNOSIS — I25.810 CORONARY ARTERY DISEASE INVOLVING CORONARY BYPASS GRAFT OF NATIVE HEART WITHOUT ANGINA PECTORIS: Primary | ICD-10-CM

## 2021-02-24 DIAGNOSIS — Z98.61 HISTORY OF PTCA: ICD-10-CM

## 2021-02-24 PROCEDURE — 99214 OFFICE O/P EST MOD 30 MIN: CPT | Performed by: INTERNAL MEDICINE

## 2021-02-24 NOTE — PATIENT INSTRUCTIONS
CAD:Yes   clinically stable. Patient is on optimal medical regimen ( see medication list above )  - Patient is currently  asymptomatic from CAD. - changes in  treatment:   no           - Testing ordered:  no  [facility] classification: 1  [place] RISK SCORE:  PATTIE RISK SCORE:  HTN:well controlled on current medical regimen, see list above. - changes in  treatment:   no   CARDIOMYOPATHY: None known   CONGESTIVE HEART FAILURE: NO KNOWN HISTORY.      VHD: No significant VHD noted  DYSLIPIDEMIA: Patient's profile is at / near Goal.yes,                                 HDL is low                                Tolerating current medical regimen well yes,                                                               See most recent Lab values in Labs section above. Diabetes mellitis: .yes,                                      Managed by family MD                                     BS under good control yes,                                      Hgb A1c avilable yes,   CAROTID ARTERY DISEASE: yes, mild               No symptoms described pertaining to Carotid artery disease               Up to date on non invasive testing .yes,                Patient is on Guidelines recommended therapy. yes,   OTHER RELEVANT DIAGNOSIS:as noted in patient's active problem list:  TESTS ORDERED: None this visit                                          All previously ordered tests reviewed. ARRHYTHMIAS: None known   MEDICATIONS: CPM   Office f/u in six months    Please be informed that if you contact our office outside of normal business hours the physician on call cannot help with any scheduling or rescheduling issues, procedure instruction questions or any type of medication issue. We advise you for any urgent/emergency that you go to the nearest emergency room!     PLEASE CALL OUR OFFICE DURING NORMAL BUSINESS HOURS    Monday - Friday   8 am to 5 pm    [address]: [phone] [place]:  [phone]    Please remember to bring all medication bottles or a medication list with you to your appointment. If you have any questions, please call our office at [phone].

## 2021-02-24 NOTE — PROGRESS NOTES
OFFICE PROGRESS NOTES      Paul Meng is a 77 y.o. male who has    CHIEF COMPLAINT AS FOLLOWS:  CHEST PAIN: Patient denies any C/O chest pains at this time. SOB: No C/O SOB at this time. LEG EDEMA: No leg edema   PALPITATIONS: Denies any C/O Palpitations   DIZZINESS: No C/O Dizziness   SYNCOPE: None   OTHER:                                     HPI: Patient is here for F/U on his CAD, HTN & Dyslipidemia problems. CAD: Patient has known Hx of  CAD. Had angioplasty / CABG in the past.  HTN: Patient has known Hx of essential HTN. Has been treated with guideline recommended medical / physical/ diet therapy as stated below. Dyslipidemia: Patient has known Hx of mixed dyslipidemia. Has been treated with guideline recommended medical / physical/ diet therapy as stated below. He does not have any new complaints at this time.     Current Outpatient Medications   Medication Sig Dispense Refill    docusate sodium (COLACE) 100 MG capsule Take 1 capsule by mouth 2 times daily 100 capsule 0    TERAZOSIN HCL PO Take by mouth      isosorbide mononitrate (IMDUR) 30 MG extended release tablet Take 1 tablet by mouth 2 times daily 60 tablet 5    amLODIPine (NORVASC) 5 MG tablet Take 5 mg by mouth daily      atorvastatin (LIPITOR) 10 MG tablet Take 1 tablet by mouth daily 90 tablet 3    lisinopril (PRINIVIL;ZESTRIL) 40 MG tablet Take 1 tablet by mouth daily 90 tablet 3    hydrochlorothiazide (HYDRODIURIL) 25 MG tablet   5    BASAGLAR KWIKPEN 100 UNIT/ML injection pen   6    metFORMIN (GLUCOPHAGE) 1000 MG tablet Take 1,000 mg by mouth 2 times daily (with meals)      aspirin EC 81 MG EC tablet Take 1 tablet by mouth daily 90 tablet 3    carvedilol (COREG) 25 MG tablet Take 1 tablet by mouth 2 times daily 180 tablet 3    oxyCODONE-acetaminophen (PERCOCET) 5-325 MG per tablet Take 1 tablet by mouth every 4 hours as needed for Pain 20 tablet 0  Insulin Aspart (NOVOLOG FLEXPEN SC) Inject 20 Units into the skin Per Sliding Scale        No current facility-administered medications for this visit. Allergies: Pcn [penicillins] and Morphine  Review of Systems:    Constitutional: Negative for diaphoresis and fatigue  Respiratory: Negative for shortness of breath  Cardiovascular: Negative for chest pain, dyspnea on exertion, claudication, edema, irregular heartbeat, murmur, palpitations or shortness of breath  Musculoskeletal: Negative for muscle pain, muscular weakness, negative for pain in arm and leg or swelling in foot and leg    Objective:  /80   Pulse 64   Ht 5' 6\" (1.676 m)   Wt 215 lb 12.8 oz (97.9 kg)   BMI 34.83 kg/m²   Wt Readings from Last 3 Encounters:   02/24/21 215 lb 12.8 oz (97.9 kg)   02/28/20 203 lb 6.4 oz (92.3 kg)   02/27/20 200 lb (90.7 kg)     Body mass index is 34.83 kg/m². GENERAL - Alert, oriented, pleasant, in no apparent distress. EYES: No jaundice, no conjunctival pallor. Neck - Supple. No jugular venous distention noted. No carotid bruits. Cardiovascular  Normal S1 and S2 without obvious murmur or gallop. Extremities - No cyanosis, clubbing, or significant edema. Pulmonary  No respiratory distress. No wheezes or rales.       Lab Review   Lab Results   Component Value Date    TROPONINT <0.010 02/11/2017    TROPONINT <0.010 02/10/2017     Lab Results   Component Value Date     03/06/2013    BNP 13 01/04/2013    PROBNP 319.3 02/10/2017     Lab Results   Component Value Date    INR 1.04 02/08/2013    INR 0.95 02/06/2013     Lab Results   Component Value Date    LABA1C 7.9 (H) 02/11/2017    LABA1C 7.1 06/01/2015     Lab Results   Component Value Date    WBC 11.6 (H) 02/12/2017    WBC 9.3 02/10/2017    HCT 44.8 02/12/2017    HCT 46.8 02/10/2017    MCV 94.9 02/12/2017    MCV 94.9 02/10/2017     02/12/2017     02/10/2017     Lab Results   Component Value Date    CHOL 85 03/03/2020 CHOL 173 02/10/2017    TRIG 54 03/03/2020    TRIG 103 02/10/2017    HDL 52 03/03/2020    HDL 60 02/10/2017    LDLCALC 92 02/10/2017    LDLCALC 70 12/22/2014    LDLDIRECT 33 03/03/2020    LDLDIRECT 66 12/02/2014     Lab Results   Component Value Date    ALT 16 02/11/2017    ALT 19 02/10/2017    AST 15 02/11/2017    AST 21 02/10/2017     BMP:    Lab Results   Component Value Date     02/11/2017     02/10/2017    K 4.3 02/11/2017    K 4.1 02/10/2017    CL 97 02/11/2017    CL 97 02/10/2017    CO2 27 02/11/2017    CO2 27 02/10/2017    BUN 11 02/11/2017    BUN 14 02/10/2017    CREATININE 0.7 02/11/2017    CREATININE 1.0 02/10/2017     CMP:   Lab Results   Component Value Date     02/11/2017     02/10/2017    K 4.3 02/11/2017    K 4.1 02/10/2017    CL 97 02/11/2017    CL 97 02/10/2017    CO2 27 02/11/2017    CO2 27 02/10/2017    BUN 11 02/11/2017    BUN 14 02/10/2017    CREATININE 0.7 02/11/2017    CREATININE 1.0 02/10/2017    PROT 6.6 02/11/2017    PROT 7.4 02/10/2017    PROT 7.7 03/06/2013    PROT 6.9 02/02/2013     No results found for: TSH, TSHHS    Stress test:   Fair exercise tolerance. 7 METs work load. Physiological BP response to exercise. ETT negative for Ischemia / Arrhythmia. ECHO 3/2017   Normal LV systolic function   Ejection fraction is visually estimated at 55%. Mild concentric left ventricular hypertrophy. Grade I diastolic dysfunction. No regional wall motion abnormalites. The left atrium is mildly dilated. No significant valvular disease noted. Mild tricuspid regurgitation. No evidence of pericardial effusion. QUALITY MEASURES REVIEWED:  1.CAD:Patient is taking anti platelet agent:Yes  2. DYSLIPIDEMIA: Patient is on cholesterol lowering medication:Yes   3. Beta-Blocker therapy for CAD, if prior Myocardial Infarction:Yes   4. Counselled regarding smoking cessation. No  5. Anticoagulation therapy (for A.Fib) No   Does Not have A.Fib. 6.Discussed weight management strategies. Assessment & Plan:    Primary / Secondary prevention is the goal by aggressive risk modification, healthy and therapeutic life style changes for cardiovascular risk reduction. Various goals are discussed and multiple questions answered.       CAD:Yes   clinically stable. Patient is on optimal medical regimen ( see medication list above )  - Patient is currently  asymptomatic from CAD. - changes in  treatment:   no           - Testing ordered:  no  [facility] classification: 1  [place] RISK SCORE:  PATTIE RISK SCORE:  HTN:well controlled on current medical regimen, see list above. - changes in  treatment:   no   CARDIOMYOPATHY: None known   CONGESTIVE HEART FAILURE: NO KNOWN HISTORY.      VHD: No significant VHD noted  DYSLIPIDEMIA: Patient's profile is at / near Goal.yes,                                 HDL is low                                Tolerating current medical regimen well yes,                                                               See most recent Lab values in Labs section above. Diabetes mellitis: .yes,                                      Managed by family MD                                     BS under good control yes,                                      Hgb A1c avilable yes,   CAROTID ARTERY DISEASE: yes, mild               No symptoms described pertaining to Carotid artery disease               Up to date on non invasive testing .yes,                Patient is on Guidelines recommended therapy. yes,   OTHER RELEVANT DIAGNOSIS:as noted in patient's active problem list:  TESTS ORDERED: None this visit                                          All previously ordered tests reviewed. ARRHYTHMIAS: None known   MEDICATIONS: CPM   Office f/u in six months.

## 2021-06-03 ENCOUNTER — HOSPITAL ENCOUNTER (OUTPATIENT)
Age: 66
Setting detail: SPECIMEN
Discharge: HOME | End: 2021-06-03
Payer: MEDICARE

## 2021-06-04 LAB
ALBUMIN SERPL-MCNC: 4.4 G/DL (ref 3.5–5.2)
ALBUMIN/GLOBULIN RATIO: 1.6 (ref 1–2.5)
ALP BLD-CCNC: 74 U/L (ref 40–129)
ALT SERPL-CCNC: 20 U/L (ref 5–41)
ANION GAP SERPL CALCULATED.3IONS-SCNC: 11 MMOL/L (ref 9–17)
AST SERPL-CCNC: 20 U/L
BILIRUB SERPL-MCNC: 0.29 MG/DL (ref 0.3–1.2)
BUN BLDV-MCNC: 22 MG/DL (ref 8–23)
BUN/CREAT BLD: ABNORMAL (ref 9–20)
CALCIUM SERPL-MCNC: 9.3 MG/DL (ref 8.6–10.4)
CHLORIDE BLD-SCNC: 104 MMOL/L (ref 98–107)
CHOLESTEROL/HDL RATIO: 2
CHOLESTEROL: 103 MG/DL
CO2: 26 MMOL/L (ref 20–31)
CREAT SERPL-MCNC: 1 MG/DL (ref 0.7–1.2)
GFR AFRICAN AMERICAN: >60 ML/MIN
GFR NON-AFRICAN AMERICAN: >60 ML/MIN
GFR SERPL CREATININE-BSD FRML MDRD: ABNORMAL ML/MIN/{1.73_M2}
GFR SERPL CREATININE-BSD FRML MDRD: ABNORMAL ML/MIN/{1.73_M2}
GLUCOSE BLD-MCNC: 113 MG/DL (ref 70–99)
HCT VFR BLD CALC: 42.9 % (ref 40.7–50.3)
HDLC SERPL-MCNC: 51 MG/DL
HEMOGLOBIN: 13.7 G/DL (ref 13–17)
LDL CHOLESTEROL: 41 MG/DL (ref 0–130)
MCH RBC QN AUTO: 31.4 PG (ref 25.2–33.5)
MCHC RBC AUTO-ENTMCNC: 31.9 G/DL (ref 28.4–34.8)
MCV RBC AUTO: 98.2 FL (ref 82.6–102.9)
NRBC AUTOMATED: 0 PER 100 WBC
PDW BLD-RTO: 11.9 % (ref 11.8–14.4)
PLATELET # BLD: NORMAL K/UL (ref 138–453)
PLATELET, FLUORESCENCE: 184 K/UL (ref 138–453)
PLATELET, IMMATURE FRACTION: 8.1 % (ref 1.1–10.3)
PMV BLD AUTO: NORMAL FL (ref 8.1–13.5)
POTASSIUM SERPL-SCNC: 5.1 MMOL/L (ref 3.7–5.3)
RBC # BLD: 4.37 M/UL (ref 4.21–5.77)
SODIUM BLD-SCNC: 141 MMOL/L (ref 135–144)
TOTAL PROTEIN: 7.2 G/DL (ref 6.4–8.3)
TRIGL SERPL-MCNC: 55 MG/DL
VLDLC SERPL CALC-MCNC: NORMAL MG/DL (ref 1–30)
WBC # BLD: 10.1 K/UL (ref 3.5–11.3)

## 2021-08-24 ENCOUNTER — OFFICE VISIT (OUTPATIENT)
Dept: CARDIOLOGY CLINIC | Age: 66
End: 2021-08-24
Payer: MEDICARE

## 2021-08-24 VITALS
BODY MASS INDEX: 33.24 KG/M2 | SYSTOLIC BLOOD PRESSURE: 108 MMHG | DIASTOLIC BLOOD PRESSURE: 60 MMHG | WEIGHT: 211.8 LBS | HEART RATE: 72 BPM | HEIGHT: 67 IN

## 2021-08-24 DIAGNOSIS — I73.9 PVD (PERIPHERAL VASCULAR DISEASE) (HCC): ICD-10-CM

## 2021-08-24 DIAGNOSIS — E11.59 TYPE 2 DIABETES MELLITUS WITH OTHER CIRCULATORY COMPLICATION, UNSPECIFIED WHETHER LONG TERM INSULIN USE (HCC): ICD-10-CM

## 2021-08-24 DIAGNOSIS — I10 ESSENTIAL HYPERTENSION: ICD-10-CM

## 2021-08-24 DIAGNOSIS — E66.09 CLASS 2 OBESITY DUE TO EXCESS CALORIES WITHOUT SERIOUS COMORBIDITY WITH BODY MASS INDEX (BMI) OF 35.0 TO 35.9 IN ADULT: ICD-10-CM

## 2021-08-24 DIAGNOSIS — I25.810 CORONARY ARTERY DISEASE INVOLVING CORONARY BYPASS GRAFT OF NATIVE HEART WITHOUT ANGINA PECTORIS: Primary | ICD-10-CM

## 2021-08-24 DIAGNOSIS — Z95.1 S/P CABG X 4: ICD-10-CM

## 2021-08-24 DIAGNOSIS — Z98.61 HISTORY OF PTCA: ICD-10-CM

## 2021-08-24 DIAGNOSIS — E78.5 HYPERLIPIDEMIA, UNSPECIFIED HYPERLIPIDEMIA TYPE: ICD-10-CM

## 2021-08-24 PROCEDURE — 99213 OFFICE O/P EST LOW 20 MIN: CPT | Performed by: INTERNAL MEDICINE

## 2021-08-24 NOTE — LETTER
[address]  [address] [address]  Phone: [phone]  Fax: [phone]    Thomas Tang MD    August 24, 2021     [facility] [employer] [address]    Patient: Lashonda Domingo   MR Number: [id number]   YOB: 1955   Date of Visit: 8/24/2021       Dear Jane Carcamo: Thank you for referring Kasandra Gil to me for evaluation/treatment. Below are the relevant portions of my assessment and plan of care. If you have questions, please do not hesitate to call me. I look forward to following Lindsay Menezes along with you.     Sincerely,        Thomas Tang MD

## 2021-08-24 NOTE — PROGRESS NOTES
OFFICE PROGRESS NOTES      Noemi Lagunas is a 77 y.o. male who has    CHIEF COMPLAINT AS FOLLOWS:  CHEST PAIN: Patient denies any C/O chest pains at this time.      SOB: No C/O SOB at this time.                 LEG EDEMA: No leg edema   PALPITATIONS: Denies any C/O Palpitations   DIZZINESS: No C/O Dizziness   SYNCOPE: None   OTHER:                                     HPI: Patient is here for F/U on his CAD, HTN & Dyslipidemia problems. CAD: Patient has known Hx of  CAD. Had CABG in the past.  HTN: Patient has known Hx of essential HTN. Has been treated with guideline recommended medical / physical/ diet therapy as stated below. Dyslipidemia: Patient has known Hx of mixed dyslipidemia. Has been treated with guideline recommended medical / physical/ diet therapy as stated below. He does not have any new complaints at this time.     Current Outpatient Medications   Medication Sig Dispense Refill    docusate sodium (COLACE) 100 MG capsule Take 1 capsule by mouth 2 times daily 100 capsule 0    TERAZOSIN HCL PO Take by mouth      isosorbide mononitrate (IMDUR) 30 MG extended release tablet Take 1 tablet by mouth 2 times daily 60 tablet 5    amLODIPine (NORVASC) 5 MG tablet Take 5 mg by mouth daily      atorvastatin (LIPITOR) 10 MG tablet Take 1 tablet by mouth daily 90 tablet 3    lisinopril (PRINIVIL;ZESTRIL) 40 MG tablet Take 1 tablet by mouth daily 90 tablet 3    hydrochlorothiazide (HYDRODIURIL) 25 MG tablet   5    BASAGLAR KWIKPEN 100 UNIT/ML injection pen   6    metFORMIN (GLUCOPHAGE) 1000 MG tablet Take 1,000 mg by mouth 2 times daily (with meals)      aspirin EC 81 MG EC tablet Take 1 tablet by mouth daily 90 tablet 3    carvedilol (COREG) 25 MG tablet Take 1 tablet by mouth 2 times daily 180 tablet 3    oxyCODONE-acetaminophen (PERCOCET) 5-325 MG per tablet Take 1 tablet by mouth every 4 hours as needed for Pain 20 tablet 0    Insulin Aspart (NOVOLOG FLEXPEN SC) Inject 20 Units into the skin Per Sliding Scale        No current facility-administered medications for this visit. Allergies: Pcn [penicillins] and Morphine  Review of Systems:    Constitutional: Negative for diaphoresis and fatigue  Respiratory: Negative for shortness of breath  Cardiovascular: Negative for chest pain, dyspnea on exertion, claudication, edema, irregular heartbeat, murmur, palpitations or shortness of breath  Musculoskeletal: Negative for muscle pain, muscular weakness, negative for pain in arm and leg or swelling in foot and leg    Objective:  /60   Pulse 72   Ht 5' 6.5\" (1.689 m)   Wt 211 lb 12.8 oz (96.1 kg)   BMI 33.67 kg/m²   Wt Readings from Last 3 Encounters:   08/24/21 211 lb 12.8 oz (96.1 kg)   02/24/21 215 lb 12.8 oz (97.9 kg)   02/28/20 203 lb 6.4 oz (92.3 kg)     Body mass index is 33.67 kg/m². GENERAL - Alert, oriented, pleasant, in no apparent distress. EYES: No jaundice, no conjunctival pallor. Neck - Supple. No jugular venous distention noted. No carotid bruits. Cardiovascular  Normal S1 and S2 without obvious murmur or gallop. Extremities - No cyanosis, clubbing, or significant edema. Pulmonary  No respiratory distress. No wheezes or rales.       Lab Review   Lab Results   Component Value Date    TROPONINT <0.010 02/11/2017    TROPONINT <0.010 02/10/2017     Lab Results   Component Value Date     03/06/2013    BNP 13 01/04/2013    PROBNP 319.3 02/10/2017     Lab Results   Component Value Date    INR 1.04 02/08/2013    INR 0.95 02/06/2013     Lab Results   Component Value Date    LABA1C 7.9 (H) 02/11/2017    LABA1C 7.1 06/01/2015     Lab Results   Component Value Date    WBC 10.1 06/03/2021    WBC 11.6 (H) 02/12/2017    HCT 42.9 06/03/2021    HCT 44.8 02/12/2017    MCV 98.2 06/03/2021    MCV 94.9 02/12/2017    PLT See Reflexed IPF Result 06/03/2021     02/12/2017     Lab Results   Component Value Date    CHOL 103 06/03/2021    CHOL 85 03/03/2020    TRIG 55 06/03/2021    TRIG 54 03/03/2020    HDL 51 06/03/2021    HDL 52 03/03/2020    LDLCALC 92 02/10/2017    LDLCALC 70 12/22/2014    LDLDIRECT 33 03/03/2020    LDLDIRECT 66 12/02/2014    CHOLHDLRATIO 2.0 06/03/2021     Lab Results   Component Value Date    ALT 20 06/03/2021    ALT 16 02/11/2017    AST 20 06/03/2021    AST 15 02/11/2017     BMP:    Lab Results   Component Value Date     06/03/2021     02/11/2017    K 5.1 06/03/2021    K 4.3 02/11/2017     06/03/2021    CL 97 02/11/2017    CO2 26 06/03/2021    CO2 27 02/11/2017    BUN 22 06/03/2021    BUN 11 02/11/2017    CREATININE 1.00 06/03/2021    CREATININE 0.7 02/11/2017     CMP:   Lab Results   Component Value Date     06/03/2021     02/11/2017    K 5.1 06/03/2021    K 4.3 02/11/2017     06/03/2021    CL 97 02/11/2017    CO2 26 06/03/2021    CO2 27 02/11/2017    BUN 22 06/03/2021    BUN 11 02/11/2017    CREATININE 1.00 06/03/2021    CREATININE 0.7 02/11/2017    PROT 7.2 06/03/2021    PROT 6.6 02/11/2017    PROT 7.7 03/06/2013    PROT 6.9 02/02/2013     No results found for: TSH, TSHHS    Stress test: 3/2020   Fair exercise tolerance. 7 METs work load.   Physiological BP response to exercise.   ETT negative for Ischemia / Arrhythmia.     ECHO 3/2017   Normal LV systolic function   Ejection fraction is visually estimated at 55%.   Mild concentric left ventricular hypertrophy.   Grade I diastolic dysfunction.   No regional wall motion abnormalites.   The left atrium is mildly dilated.   No significant valvular disease noted.   Mild tricuspid regurgitation.   No evidence of pericardial effusion. QUALITY MEASURES REVIEWED:  1.CAD:Patient is taking anti platelet agent:Yes  2. DYSLIPIDEMIA: Patient is on cholesterol lowering medication:Yes   3. Beta-Blocker therapy for CAD, if prior Myocardial Infarction:Yes   4. Counselled regarding smoking cessation. No   Patient does not Smoke.   5.Anticoagulation therapy (for A.Fib) No   Does Not have A.Fib. 6.Discussed weight management strategies. Assessment & Plan:    Primary / Secondary prevention is the goal by aggressive risk modification, healthy and therapeutic life style changes for cardiovascular risk reduction. Various goals are discussed and multiple questions answered.       CAD:Yes   clinically stable. Patient is on optimal medical regimen ( see medication list above )  - Patient is currently  asymptomatic from CAD.          - changes in  treatment:   no           - Testing ordered:  no  [place] classification: 1  [place] RISK SCORE:  [place]ATTIE RISK SCORE:  HTN:well controlled on current medical regimen, see list above.              - changes in  treatment:   no   CARDIOMYOPATHY: None known   CONGESTIVE HEART FAILURE: NO KNOWN HISTORY.      VHD: No significant VHD noted  DYSLIPIDEMIA: Patient's profile is at / near [employer] [facility] is low                                Tolerating current medical regimen well yes,                                                               See most recent Lab values in Labs section above.   Diabetes mellitis: .yes,                                      Managed by family MD                                     BS under good control yes,                                      Hgb A1c avilable yes,   CAROTID ARTERY DISEASE: yes, mild               No symptoms described pertaining to Carotid artery disease               Up to date on non invasive testing .yes,                Patient is on Guidelines recommended therapy. yes,   OTHER RELEVANT DIAGNOSIS:as noted in patient's active problem list:  TESTS ORDERED: None this visit                                          All previously ordered tests reviewed.   ARRHYTHMIAS: None known   MEDICATIONS: CPM   Office f/u in six months.

## 2021-08-24 NOTE — PATIENT INSTRUCTIONS
CAD:Yes   clinically stable. Patient is on optimal medical regimen ( see medication list above )  - Patient is currently  asymptomatic from CAD.          - changes in  treatment:   no           - Testing ordered:  no  [place] classification: 1  [place] RISK SCORE:  PATTIE RISK SCORE:  HTN:well controlled on current medical regimen, see list above.              - changes in  treatment:   no   CARDIOMYOPATHY: None known   CONGESTIVE HEART FAILURE: NO KNOWN HISTORY.      VHD: No significant VHD noted  DYSLIPIDEMIA: Patient's profile is at / near [employer] [facility] is low                                Tolerating current medical regimen well yes,                                                               See most recent Lab values in Labs section above.   Diabetes mellitis: .yes,                                      Managed by family MD                                     BS under good control yes,                                      Hgb A1c avilable yes,   CAROTID ARTERY DISEASE: yes, mild               No symptoms described pertaining to Carotid artery disease               Up to date on non invasive testing .yes,                Patient is on Guidelines recommended therapy. yes,   OTHER RELEVANT DIAGNOSIS:as noted in patient's active problem list:  TESTS ORDERED: None this visit                                          All previously ordered tests reviewed.   ARRHYTHMIAS: None known   MEDICATIONS: CPM   Office f/u in six months.

## 2021-12-05 ENCOUNTER — APPOINTMENT (OUTPATIENT)
Dept: GENERAL RADIOLOGY | Age: 66
End: 2021-12-05
Payer: MEDICARE

## 2021-12-05 ENCOUNTER — HOSPITAL ENCOUNTER (EMERGENCY)
Age: 66
Discharge: HOME | End: 2021-12-05
Attending: EMERGENCY MEDICINE
Payer: MEDICARE

## 2021-12-05 VITALS
RESPIRATION RATE: 18 BRPM | SYSTOLIC BLOOD PRESSURE: 141 MMHG | HEART RATE: 66 BPM | TEMPERATURE: 97.8 F | DIASTOLIC BLOOD PRESSURE: 74 MMHG | OXYGEN SATURATION: 95 % | WEIGHT: 215 LBS | BODY MASS INDEX: 34.55 KG/M2 | HEIGHT: 66 IN

## 2021-12-05 DIAGNOSIS — S63.502A SPRAIN OF LEFT WRIST, INITIAL ENCOUNTER: Primary | ICD-10-CM

## 2021-12-05 LAB — GLUCOSE BLD-MCNC: 187 MG/DL (ref 70–99)

## 2021-12-05 PROCEDURE — 99285 EMERGENCY DEPT VISIT HI MDM: CPT

## 2021-12-05 PROCEDURE — 6370000000 HC RX 637 (ALT 250 FOR IP): Performed by: EMERGENCY MEDICINE

## 2021-12-05 PROCEDURE — 73090 X-RAY EXAM OF FOREARM: CPT

## 2021-12-05 PROCEDURE — 73110 X-RAY EXAM OF WRIST: CPT

## 2021-12-05 PROCEDURE — 82962 GLUCOSE BLOOD TEST: CPT

## 2021-12-05 PROCEDURE — 73080 X-RAY EXAM OF ELBOW: CPT

## 2021-12-05 PROCEDURE — 73060 X-RAY EXAM OF HUMERUS: CPT

## 2021-12-05 RX ORDER — ACETAMINOPHEN 325 MG/1
650 TABLET ORAL EVERY 6 HOURS PRN
Qty: 120 TABLET | Refills: 0 | Status: SHIPPED | OUTPATIENT
Start: 2021-12-05

## 2021-12-05 RX ORDER — OXYCODONE HYDROCHLORIDE 5 MG/1
5 TABLET ORAL ONCE
Status: COMPLETED | OUTPATIENT
Start: 2021-12-05 | End: 2021-12-05

## 2021-12-05 RX ORDER — ACETAMINOPHEN 500 MG
1000 TABLET ORAL ONCE
Status: COMPLETED | OUTPATIENT
Start: 2021-12-05 | End: 2021-12-05

## 2021-12-05 RX ORDER — OXYCODONE HYDROCHLORIDE 5 MG/1
5 TABLET ORAL EVERY 8 HOURS PRN
Qty: 9 TABLET | Refills: 0 | Status: SHIPPED | OUTPATIENT
Start: 2021-12-05 | End: 2021-12-08 | Stop reason: ALTCHOICE

## 2021-12-05 RX ADMIN — OXYCODONE HYDROCHLORIDE 5 MG: 5 TABLET ORAL at 22:52

## 2021-12-05 RX ADMIN — OXYCODONE 5 MG: 5 TABLET ORAL at 19:57

## 2021-12-05 RX ADMIN — ACETAMINOPHEN 1000 MG: 500 TABLET ORAL at 19:57

## 2021-12-05 ASSESSMENT — PAIN SCALES - GENERAL
PAINLEVEL_OUTOF10: 8
PAINLEVEL_OUTOF10: 8
PAINLEVEL_OUTOF10: 5
PAINLEVEL_OUTOF10: 6

## 2021-12-05 NOTE — ED TRIAGE NOTES
Pt to ED with complaints of left arm pain after loc and fall early this morning. Pt believes episode was related to low blood glucose.

## 2021-12-05 NOTE — ED PROVIDER NOTES
12 lead EKG per my interpretation:  Normal Sinus Rhythm 90  Axis is   Normal  QTc is  normal  There is no specific T wave changes appreciated. There is no specific ST wave changes appreciated.     Prior EKG to compare with was available no changes from previous EKG       Goran Szymanski MD  12/05/21 [id number]

## 2021-12-06 ENCOUNTER — HOSPITAL ENCOUNTER (EMERGENCY)
Age: 66
Discharge: HOME | End: 2021-12-06
Attending: EMERGENCY MEDICINE
Payer: MEDICARE

## 2021-12-06 ENCOUNTER — APPOINTMENT (OUTPATIENT)
Dept: CT IMAGING | Age: 66
End: 2021-12-06
Payer: MEDICARE

## 2021-12-06 VITALS
SYSTOLIC BLOOD PRESSURE: 120 MMHG | DIASTOLIC BLOOD PRESSURE: 64 MMHG | RESPIRATION RATE: 18 BRPM | HEART RATE: 85 BPM | OXYGEN SATURATION: 95 % | TEMPERATURE: 98.4 F

## 2021-12-06 DIAGNOSIS — M25.532 LEFT WRIST PAIN: Primary | ICD-10-CM

## 2021-12-06 DIAGNOSIS — M19.039 WRIST ARTHROPATHY: ICD-10-CM

## 2021-12-06 PROCEDURE — 99283 EMERGENCY DEPT VISIT LOW MDM: CPT

## 2021-12-06 PROCEDURE — 6360000002 HC RX W HCPCS: Performed by: PHYSICIAN ASSISTANT

## 2021-12-06 PROCEDURE — 6370000000 HC RX 637 (ALT 250 FOR IP): Performed by: PHYSICIAN ASSISTANT

## 2021-12-06 PROCEDURE — 73200 CT UPPER EXTREMITY W/O DYE: CPT

## 2021-12-06 RX ORDER — KETOROLAC TROMETHAMINE 30 MG/ML
30 INJECTION, SOLUTION INTRAMUSCULAR; INTRAVENOUS ONCE
Status: COMPLETED | OUTPATIENT
Start: 2021-12-06 | End: 2021-12-06

## 2021-12-06 RX ORDER — OXYCODONE HYDROCHLORIDE AND ACETAMINOPHEN 5; 325 MG/1; MG/1
1 TABLET ORAL ONCE
Status: COMPLETED | OUTPATIENT
Start: 2021-12-06 | End: 2021-12-06

## 2021-12-06 RX ADMIN — KETOROLAC TROMETHAMINE 30 MG: 30 INJECTION, SOLUTION INTRAMUSCULAR; INTRAVENOUS at 11:09

## 2021-12-06 RX ADMIN — OXYCODONE AND ACETAMINOPHEN 1 TABLET: 5; 325 TABLET ORAL at 11:09

## 2021-12-06 ASSESSMENT — PAIN SCALES - GENERAL
PAINLEVEL_OUTOF10: 10
PAINLEVEL_OUTOF10: 10

## 2021-12-06 ASSESSMENT — PAIN DESCRIPTION - LOCATION: LOCATION: ARM

## 2021-12-06 ASSESSMENT — PAIN DESCRIPTION - ORIENTATION: ORIENTATION: LEFT

## 2021-12-06 ASSESSMENT — PAIN DESCRIPTION - PAIN TYPE: TYPE: ACUTE PAIN

## 2021-12-06 NOTE — ED PROVIDER NOTES
hematuria    Denies any other muscles skeletal injuries, including elbow, shoulder,chest wall and back. All other review of systems are negative  See HPI and nursing notes for additional information     PAST MEDICAL & SURGICAL HISTORY    Past Medical History:   Diagnosis Date    Angina at rest [facility])     Arthritis     \"Hands, Knees, Shoulders\"    CAD (coronary artery disease)     Sees Dr. Debby Miller Carotid  Doppler 03/13/2017    Duplex sonography with color flow enhancement was performed bilaterally on the cervical carotid system. No evidence of hemodynamically significant stenosis in the bilateral internal carotid arteries. There is evidence of hemodynamically significant stenosis of the bilateral external carotid arteries, L > R.    Cellulitis 2/2/2013    5th finger-Left hand-ER visit -report in Epic    Chronic back pain     Diabetes mellitus ([address] Dx 2007    Fractured rib Dx 9-13    Full dentures     Gonorrhea Dx 18's    \"Had Treatment\"    H/O cardiac catheterization 2/5/2013 2/5/2013-Severe 3 vessel CAD-recomm CABG- Dr Carlos Hines - report in epic    H/O cardiovascular stress test 2/4/2013 2/4/2013-mod ischemia inferior wall. EF 53%. LVSF normal-Dr Jimmy Mcdermott - report in Psychiatric    H/O cardiovascular stress test 1/16/2015    treadmill    H/O Doppler ultrasound 2/6/2013    CAROTID DOPPLER-2/6/2013-There is no hemodynamic significant stenosis present.estimated 16-49 % stenosis of both internal carotid arteries. - report in epic    H/O echocardiogram 03/08/2013    BD81-99%, LV systolic function is borderline reduced, , abnormal LV diastolic function Stage 1, septal motion is consistent with post-operative state. , no pericardial effusion.  Heart attack ([address] 1993    History of cardiovascular stress test 03/13/2017    Normal perfusion study with normal distribution in all coronal, short, and horizontal axis. Normal LV function & wall motion.  LVEF is 55 %    History of echocardiogram 03/13/2017    Normal LV systolic functionEjection fraction is visually estimated at 55%. Mild concentric left ventricular hypertrophy. Grade I diastolic dysfunction. No regional wall motion abnormalites. The left atrium is mildly dilated. No significant valvular disease noted. Mild tricuspid regurgitation. No evidence of pericardial effusion.  History of exercise stress test 03/05/2020    Treadmill,  Physiological BP response to  exercise   ETT negative for Ischemia / Arrhythmia.  History of PTCA 11/2010 1 stent 12/2010 2 stents    stents 3 mid lad prox lad & rca    History of shingles Last Episode In 2000's    \"Face, Torso\"    Hx of Carotid Doppler 03/04/2019    Mild (0-49%) disease of the bilateral internal carotid, significant stenosis of the bilateral external carotid arteries L>R, Normal right vertebral flow, left vertebral flow non visualized    Hyperlipidemia     Hypertension     Kidney stone 1990's    Kidney Stone Surgery    S/P CABG x 4 2/8/2013 2/8/2013-LIMA to LAD; VG to OM;VG to Diagonal; VG to RCA -Dr Isaacs [facility]- report in [facility] Patient states that he started Valtrex for this 6/30/14.     Shortness of breath on exertion     Wears glasses      Past Surgical History:   Procedure Laterality Date    CARDIAC CATHETERIZATION  2/5/2013 2/5/2013-Severe 3 vessel disease- Recomm CABG- report in [address] [place] [address] [address]  2-8-13    CABG ([address])    COLONOSCOPY  Last Done 10-8-15    CORONARY ANGIOPLASTY  11-10    One Heart Stent    CORONARY ANGIOPLASTY  1-11-11    Two Heart Stents    CORONARY ARTERY BYPASS GRAFT  2/8/2013 2/8/2013- CABG X 4-LIMA to LAD; VG to OM;VG to Diagonal; VG to RCA -Dr Misty Cifuentes      All Teeth Extracted In Past    ENDOSCOPY, COLON, DIAGNOSTIC  03/28/2017    normal esophagus, gastritis found, moderate amount of food particles in the stomach    INGUINAL HERNIA REPAIR Left 1971    KIDNEY STONE SURGERY  1990's    KNEE ARTHROSCOPY Left 5/26/16    Partial medial and lateral meniscectomy & chondroplasty       CURRENT MEDICATIONS    Current Outpatient Rx   Medication Sig Dispense Refill    oxyCODONE (ROXICODONE) 5 MG immediate release tablet Take 1 tablet by mouth every 8 hours as needed for Pain for up to 7 days.  9 tablet 0    acetaminophen (AMINOFEN) 325 MG tablet Take 2 tablets by mouth every 6 hours as needed for Pain 120 tablet 0    docusate sodium (COLACE) 100 MG capsule Take 1 capsule by mouth 2 times daily 100 capsule 0    TERAZOSIN HCL PO Take by mouth      isosorbide mononitrate (IMDUR) 30 MG extended release tablet Take 1 tablet by mouth 2 times daily 60 tablet 5    amLODIPine (NORVASC) 5 MG tablet Take 5 mg by mouth daily      atorvastatin (LIPITOR) 10 MG tablet Take 1 tablet by mouth daily 90 tablet 3    lisinopril (PRINIVIL;ZESTRIL) 40 MG tablet Take 1 tablet by mouth daily 90 tablet 3    hydrochlorothiazide (HYDRODIURIL) 25 MG tablet   5    BASAGLAR KWIKPEN 100 UNIT/ML injection pen   6    metFORMIN (GLUCOPHAGE) 1000 MG tablet Take 1,000 mg by mouth 2 times daily (with meals)      aspirin EC 81 MG EC tablet Take 1 tablet by mouth daily 90 tablet 3    carvedilol (COREG) 25 MG tablet Take 1 tablet by mouth 2 times daily 180 tablet 3    oxyCODONE-acetaminophen (PERCOCET) 5-325 MG per tablet Take 1 tablet by mouth every 4 hours as needed for Pain 20 tablet 0    Insulin Aspart (NOVOLOG FLEXPEN SC) Inject 20 Units into the skin Per Sliding Scale          ALLERGIES    Allergies   Allergen Reactions    Pcn [Penicillins] Hives, Itching and Rash    Morphine Itching       SOCIAL & FAMILY HISTORY    Social History     Socioeconomic History    Marital status:      Spouse name: None    Number of children: None    Years of education: None    Highest education level: None   Occupational History    None   Tobacco Use    Smoking status: Never Smoker    Smokeless tobacco: Never Used   Substance and Sexual Activity    Alcohol use: No     Alcohol/week: 0.0 standard drinks    Drug use: Yes     Types: Marijuana Darryle Pimsurinder)     Comment: \"Use Marijuana About Every Other Day\"last 3-27-17    Sexual activity: Never   Other Topics Concern    None   Social History Narrative    None     Social Determinants of Health     Financial Resource Strain:     Difficulty of Paying Living Expenses: Not on file   Food Insecurity:     Worried About Running Out of Food in the Last Year: Not on file    Gui of Food in the Last Year: Not on file   Transportation Needs:     Lack of Transportation (Medical): Not on file    Lack of Transportation (Non-Medical):  Not on file   Physical Activity:     Days of Exercise per Week: Not on file    Minutes of Exercise per Session: Not on file   Stress:     Feeling of Stress : Not on file   Social Connections:     Frequency of Communication with Friends and Family: Not on file    Frequency of Social Gatherings with Friends and Family: Not on file    Attends Jewish Services: Not on file    Active Member of [address] [place] [address] or Organizations: Not on file    Attends Club or Organization Meetings: Not on file    Marital Status: Not on file   Intimate Partner Violence:     Fear of Current or Ex-Partner: Not on file    Emotionally Abused: Not on file    Physically Abused: Not on file    Sexually Abused: Not on file   Housing Stability:     Unable to Pay for Housing in the Last Year: Not on file    Number of [place] in the Last Year: Not on file    Unstable Housing in the Last Year: Not on file     Family History   Problem Relation Age of Onset    Heart Disease Mother         Heart Attack    Early Death Mother         Heart Attack    Early Death Brother         64 Or 62    Substance Abuse Brother         Alcoholic    Early Death Father 48        \"He Drank Himself To Death\"    Substance Abuse Father         Alcohol    Cirrhosis Father     Early Death Sister 2        Leukemia    Cancer Sister Leukemia    Early Death Sister 61        Stroke    Stroke Sister     Arthritis Sister     Heart Disease Sister     Vision Loss Sister         \"RP\"           PHYSICAL EXAM    VITAL SIGNS: /64   Pulse 85   Temp 98.4 °F (36.9 °C) (Oral)   Resp 18   SpO2 95%   Constitutional:  Well developed, well nourished, no acute distress, non-toxic appearance   HENT:  Atraumatic, Normocephalic, EOMIs, conjunctiva clear, nasal bones midline    Musculoskeletal:    Left Wrist: Scattered healing scabbed abrasions without redness warmth, per patient secondary to his dog. No gross bony deformities, skin discoloration, erythema, or warmth. There is mild to moderate tenderness over the left wrist dorsum with moderate constipation this area as well as over the scaphoid region without palpable bony or soft tissue defects. Very limited range of motion of the wrist secondary to pain especially wrist extension. No wrist drop. Full thumb opposition and digit range of motion including IP and MCP flexion extension. Wrist joint is otherwise stable, radial pulse 2+. Wrist capillary refill. Sensation intact to light sharp touch. Compartments are soft in the left upper extremity. No tenderness to the forearm or elbow. Elbow, including radial head is non-tender. No swelling, discoloration, or tenderness to palpation of the ipsilateral elbow and hand/fingers. Distal motor, sensation, capillary refill intact. Integument:  Well hydrated, no rash. skin intact  Vascular: Affected extremity distally neurovascularly intact - sensation and capillary refill intact. Neurologic:  Awake alert, normal flow ofspeech. 2/5  strength. Equal sensation over the bilateral deltoids and distally. No wrist drop. DTRs and distal sensation equal/intact.   Psychiatric: Cooperative, pleasant affect    RADIOLOGY/PROCEDURES            CT WRIST LEFT WO CONTRAST (Preliminary result)  Result time 12/06/21 11:54:12  Preliminary result by [facility] Nava Kapadia MD (12/06/21 11:54:12)                Impression:    No acute displaced fracture identified.  Small calcific fragment measuring 3   mm is seen at the anterior lip of the distal radius near the articular   surface, with concavity of the adjacent radius.  This could represent an age   indeterminate, potentially chronic, fracture.  This could be further assessed   with MRI.  Alternatively, consider short-term follow-up radiographs. Chronic nonunited ulnar styloid process fracture. Severe 1st CMC joint degenerative changes.  Mild to moderate triscaphe joint   degenerative changes. Intraosseous cysts are seen throughout the wrist.     There are calcifications involving the scapholunate and lunotriquetral   ligaments.  This can be associated with CPPD arthropathy. Narrative:    EXAMINATION:   CT OF THE LEFT WRIST WITHOUT CONTRAST 12/6/2021 11:35 am     TECHNIQUE:   CT of the left wrist was performed without the administration of intravenous   contrast.  Multiplanar reformatted images are provided for review. Dose   modulation, iterative reconstruction, and/or weight based adjustment of the   mA/kV was utilized to reduce the radiation dose to as low as reasonably   achievable. COMPARISON:   Radiographs 12/05/2021.      HISTORY   ORDERING SYSTEM PROVIDED HISTORY: [address] injury, fall yesterday   TECHNOLOGIST PROVIDED HISTORY:   Reason for exam:->FOOSH injury, fall yesterday   Decision Support Exception - unselect if not a suspected or confirmed   emergency medical condition->Emergency Medical Condition (MA)   Reason for Exam: [address] injury, fall yesterday   Acuity: Acute   Type of Exam: Initial   Mechanism of Injury: fall   Relevant Medical/Surgical History: none     FINDINGS:   No acute displaced fracture is identified. Rikki Coreas is a chronic nonunited   ulnar styloid process fracture.  At the anterior lip of the distal radius,   there is a small calcific fragment measuring 3 mm, with concavity of the   adjacent radius. Severe 1st CMC joint degenerative changes.  Mild to moderate degenerative   changes at the triscaphe joint.  Intraosseous changes are seen throughout the   wrist.  Mild subchondral sclerosis is seen at the base of the lunate. There are calcifications involving the scapholunate and lunotriquetral   ligaments.                 Preliminary result by Reuben Mills MD (12/06/21 11:51:39)                Impression:    No acute displaced fracture identified.  Small calcific fragment measuring 3   mm is seen at the anterior lip of the distal radius near the articular   surface, with concavity of the adjacent radius.  This could represent an age   indeterminate, potentially chronic, fracture.  This could be further assessed   with MRI.  Alternatively, consider short-term follow-up radiographs. Chronic nonunited ulnar styloid process fracture. Severe 1st CMC joint degenerative changes.  Mild to moderate triscaphe joint   degenerative changes. Intraosseous cysts is seen throughout the wrist.     There are calcifications involving the scapholunate and lunotriquetral   ligaments.  This can be associated with CPPD arthropathy.                         PROCEDURES   SPLINT PLACEMENT     A velcro wrist splint was applied by the emergency department technician. The splint is in good position. The patient's extremity is neurovascularly intact after the splint placement. ED COURSE & MEDICAL DECISION MAKING       Vital signs and nursing notes reviewed during ED course. I have independently evaluated this patient . Supervising MD  - Dr. Ruchi Villarreal - present in the Emergency Department, available for consultation, throughout entirety of  patient care. All pertinent Lab data and radiographic results reviewed with patient at bedside.        The patient and/or the family were informed of the results of any tests/labs/imaging, the treatment plan, and time was allotted to answer questions. Differential diagnosis: includes but not limited to ligamentous injury, tendon injury, soft tissue contusion/hematoma, fracture, dislocation, Infection, Neurologic Deficit/Injury. Clinical  IMPRESSION    1. Left wrist pain    2. Wrist arthropathy          Patient presents for persistent left wrist pain, seen the ED yesterday for similar complaints. On exam, pleasant nontoxic 28-year-old male, laying comfortable appearing. No gross bony deformities of the left upper extremity. There is generalized soft tissue swelling and tenderness across the wrist dorsum as well as scaphoid region with full thumb opposition. Very limited active and passive range of motion of the left wrist secondary to pain the patient is neurovascular intact distally. Decreased strength secondary to pain and decreased range of motion. .  No wrist drop. Patient has intact range of motion in the proximal left elbow and shoulder. Soft compartments throughout. Patient is given ice pack, IM Toradol and oral Percocet. On chart review, patient had x-rays of left elbow, wrist, radius/ulna and left humerus which showed no evidence of acute fracture or subluxation however there were noted degenerative changes of the triscaphe joint with a benign-appearing cystic change at the distal pole of the scaphoid with degenerative appearing cyst.  Also degenerative changes noted at the lunate. Unfortunately, ED provider note from yesterday has not been fully completed and patient does appear to be localized majority pain at this time to left wrist so we will go forward with CT wrist noncontrast imaging.    CT imaging today shows no acute displaced fracture however there are noted degenerative changes within the wrist joints including a age-indeterminate chronic fracture of the anterior lip of the distal radius with additional chronic nonunited ulnar styloid fracture and severe first [facility] joint degenerative changes with mild to moderate tricaphe joint degenerative changes. Also intraosseous cysts noted throughout the wrist with findings of CPPD arthropathy. At this time, discussed with patient likely acute wrist sprain versus strain exacerbated by his underlying degenerative process of the wrist.  We discussed supportive symptomatic care and outpatient follow-up with orthopedics as he may benefit from advanced MRI imaging versus physical therapy versus other nonemergent surgical management. Patient was prescribed oxycodone yesterday and I encouraged him to  this prescription. We educated patient on RICE therapy and orthopedic follow-up. He is comfortable and agreed with this plan. Patient had a splint on less than 12 hours and did remove the splint himself prior to ED arrival so low clinical suspicion for DVT of the left upper arm that would warrant ultrasound imaging at this time. Low clinical suspicion for ischemic limb, compartment syndrome, intra-articular joint infection/septic arthritis, DVT, osteomyelitis, arterial/neurologic injury, necrotizing fasciitis, or infected/rapidly expanding hematoma. Patient is discharged in stable condition. Educated on [address] therapy. Patient is instructed to followup with orthopedics in 7-10 days, sooner with worsened symptoms. Return precautions back to the ED discussed for any new or worsening symptoms. Diagnosis and plan discussed in detail with patient who understands and agrees. Patient agrees to return emergency department if symptoms worsen or any new symptoms develop. I did discuss this patient's history, ED presentation/course with my attending physician - Dr. Teressa Heimlich - who has also seen and evaluated this patient. He/she does agree that discharge is reasonable at this time with ortho followup. Please see his/her note for additional details of their evaluation.     Comment: Please note this report has been produced using speech recognition software and may contain errors related to that system including errors in grammar, punctuation, and spelling, as well as words and phrases that may be inappropriate. If there are any questions or concerns please feel free to contact the dictating provider for clarification.         Kaden Flores PA-C  12/06/21 [id number]

## 2021-12-06 NOTE — ED PROVIDER NOTES
I independently examined and evaluated Spike Bryant. In brief their history revealed left wrist pain. He states he was seen here yesterday for this. Reports he fell yesterday, had it evaluated but they recommended CT of his wrist.  He states he had already been here 8 hours and wanted to leave. He states that they recommend this because his wrist was very painful and they did not see a fracture on x-ray. States he took the splint off last night because he gets too tight. No new injury since then. Their focused exam revealed awake, alert, well-hydrated, well-nourished, and in no acute distress. Mucous membranes are moist. Speech is clear. Breathing is unlabored. Skin is dry. Mental status is normal. The patient has normal gait, moves all extremities, and is without facial droop. Dorsal wrist swelling with tenderness. ED course: 24-year-old male who presented for wrist pain. CT the is reportedly supposed to be wearing yesterday was performed which shows no acute displaced fracture. Chronic fractures were seen. There are cysts and calcifications but no acute fracture. Will place in splint and have him follow up with orthopedics outpatient. Discussed the possibility of internal derangement, may need MRI as an outpatient basis    All diagnostic, treatment, and disposition decisions were made by myself in conjunction with the Advanced Practice Provider. For all further details of the patient's emergency department visit, please see the Advanced Practice Provider's documentation.       Elyse Galan,   12/06/21 [id number]

## 2021-12-06 NOTE — ED NOTES
Discharge instructions given. Pt verbalized understanding. Pt ambulated to waiting room.         Leila Palacio RN  12/06/21 [id number]

## 2021-12-06 NOTE — ED PROVIDER NOTES
Emergency [address] EMERGENCY DEPARTMENT    Patient: Brittani Solares  MRN: [id number]  : 1955  Date of Evaluation: 2021  ED Provider: Sadi Moon MD    Chief Complaint       Chief Complaint   Patient presents with    Loss of Consciousness    Arm Pain     left     [place]     Brittani Solares is a 77 y.o. male who presents to the emergency department after feeling like his sugar was low when he was sitting in bed, standing up and then feeling lightheaded and passing out. Patient woke up and then ate several candy bars and felt better however he had left wrist pain afterwards. No chest pain, dizziness or any other sxs. He has had these episodes before when his sugar is low. AFter eating the candy bars, he has had no sxs since. His only complaint now is his wrist is hurting as he put his arm out when he feel. He didn't hit his head. ROS:     At least 10 systems reviewed and otherwise acutely negative except as in the [address] Past History     Past Medical History:   Diagnosis Date    Angina at rest [facility])     Arthritis     \"Hands, Knees, Shoulders\"    CAD (coronary artery disease)     Sees Dr. Presley Vizcaino Carotid  Doppler 2017    Duplex sonography with color flow enhancement was performed bilaterally on the cervical carotid system. No evidence of hemodynamically significant stenosis in the bilateral internal carotid arteries. There is evidence of hemodynamically significant stenosis of the bilateral external carotid arteries, L > R.    Cellulitis 2013    5th finger-Left hand-ER visit -report in Epic    Chronic back pain     Diabetes mellitus ([address] Dx 2007    Fractured rib Dx 9-13    Full dentures     Gonorrhea Dx 18's    \"Had Treatment\"    H/O cardiac catheterization 2013-Severe 3 vessel CAD-recomm CABG- Dr Charles Iglesias - report in epic    H/O cardiovascular stress test 2013-mod ischemia inferior wall. EF 53%. LVSF normal-Dr Selin Padilla - report in [facility]    H/O cardiovascular stress test 1/16/2015    treadmill    H/O Doppler ultrasound 2/6/2013    CAROTID DOPPLER-2/6/2013-There is no hemodynamic significant stenosis present.estimated 16-49 % stenosis of both internal carotid arteries. - report in epic    H/O echocardiogram 03/08/2013    HR19-56%, LV systolic function is borderline reduced, , abnormal LV diastolic function Stage 1, septal motion is consistent with post-operative state. , no pericardial effusion.  Heart attack ([address] 1993    History of cardiovascular stress test 03/13/2017    Normal perfusion study with normal distribution in all coronal, short, and horizontal axis. Normal LV function & wall motion. LVEF is 55 %    History of echocardiogram 03/13/2017    Normal LV systolic functionEjection fraction is visually estimated at 55%. Mild concentric left ventricular hypertrophy. Grade I diastolic dysfunction. No regional wall motion abnormalites. The left atrium is mildly dilated. No significant valvular disease noted. Mild tricuspid regurgitation. No evidence of pericardial effusion.  History of exercise stress test 03/05/2020    Treadmill,  Physiological BP response to  exercise   ETT negative for Ischemia / Arrhythmia.  History of PTCA 11/2010 1 stent 12/2010 2 stents    stents 3 mid lad prox lad & rca    History of shingles Last Episode In 2000's    \"Face, Torso\"    Hx of Carotid Doppler 03/04/2019    Mild (0-49%) disease of the bilateral internal carotid, significant stenosis of the bilateral external carotid arteries L>R, Normal right vertebral flow, left vertebral flow non visualized    Hyperlipidemia     Hypertension     Kidney stone 1990's    Kidney Stone Surgery    S/P CABG x 4 2/8/2013 2/8/2013-LIMA to LAD; VG to OM;VG to Diagonal; VG to RCA -Dr Darek Sol- report in [facility]    Shingles Patient states that he started Valtrex for this 6/30/14.     Shortness of breath on exertion     Wears glasses      Past Surgical History:   Procedure Laterality Date    CARDIAC CATHETERIZATION  2/5/2013 2/5/2013-Severe 3 vessel disease- Recomm CABG- report in [address]  2-8-13    CABG ([address])    COLONOSCOPY  Last Done 10-8-15    CORONARY ANGIOPLASTY  11-10    One Heart Stent    CORONARY ANGIOPLASTY  1-11-11    Two Heart Stents    CORONARY ARTERY BYPASS GRAFT  2/8/2013 2/8/2013- CABG X 4-LIMA to LAD; VG to OM;VG to Diagonal; VG to RCA -Dr Orestes Lauren      All Teeth Extracted In Past    ENDOSCOPY, COLON, DIAGNOSTIC  03/28/2017    normal esophagus, gastritis found, moderate amount of food particles in the stomach    INGUINAL HERNIA REPAIR Left 1971    KIDNEY STONE SURGERY  1990's    KNEE ARTHROSCOPY Left 5/26/16    Partial medial and lateral meniscectomy & chondroplasty     Social History     Socioeconomic History    Marital status:      Spouse name: None    Number of children: None    Years of education: None    Highest education level: None   Occupational History    None   Tobacco Use    Smoking status: Never Smoker    Smokeless tobacco: Never Used   Substance and Sexual Activity    Alcohol use: No     Alcohol/week: 0.0 standard drinks    Drug use: Yes     Types: Marijuana Gregorio Blow)     Comment: \"Use Marijuana About Every Other Day\"last 3-27-17    Sexual activity: Never   Other Topics Concern    None   Social History Narrative    None     Social Determinants of Health     Financial Resource Strain:     Difficulty of Paying Living Expenses: Not on file   Food Insecurity:     Worried About Running Out of Food in the Last Year: Not on file    Gui of Food in the Last Year: Not on file   Transportation Needs:     Lack of Transportation (Medical): Not on file    Lack of Transportation (Non-Medical):  Not on file   Physical Activity:     Days of Exercise per Week: Not on file    Minutes of Exercise per Session: Not Vitals [12/05/21 1620]   BP Temp Temp Source Pulse Resp SpO2 Height Weight   (!) 141/74 97.8 °F (36.6 °C) Oral 66 18 95 % 5' 6\" (1.676 m) 215 lb (97.5 kg)     GENERAL APPEARANCE: Awake and alert. Cooperative. No acute distress. HEAD: Normocephalic. Atraumatic. EYES: Sclera anicteric. ENT: Tolerates saliva. No trismus. NECK: Supple. Trachea midline. CARDIO: RRR. Radial pulse 2+. LUNGS: Respirations unlabored. CTAB. ABDOMEN: Soft. Non-distended. Non-tender. EXTREMITIES: No acute deformities. Left wrist ttp laterally, no snuff box tenderenss, radial and ulnar pulses intact, sensation  Intact  Throughout, no ttp of the elbow  SKIN: Warm and dry. NEUROLOGICAL: No gross facial drooping. Moves all 4 extremities. CN 2-12 inact, sensation intact throughout, ambulates witout difficulty, 5/5 strenght in the bl upper and lower ext . PSYCHIATRIC: Normal mood. Diagnostics   Labs:  Results for orders placed or performed during the hospital encounter of 12/05/21   POCT Glucose   Result Value Ref Range    POC Glucose 187 (H) 70 - 99 MG/DL     Radiographs:  XR HUMERUS LEFT (MIN 2 VIEWS)    Result Date: 12/5/2021  EXAMINATION: TWO XRAY VIEWS OF THE LEFT HUMERUS; 3 XRAY VIEWS OF THE LEFT WRIST; TWO XRAY VIEWS OF THE LEFT FOREARM; THREE XRAY VIEWS OF THE LEFT ELBOW 12/5/2021 7:46 pm COMPARISON: None.  HISTORY: ORDERING SYSTEM PROVIDED HISTORY: fall with pain TECHNOLOGIST PROVIDED HISTORY: Reason for exam:->fall with pain Reason for Exam: fall with pain Acuity: Acute Type of Exam: Initial Mechanism of Injury: fall with pain Relevant Medical/Surgical History: fall with pain; ORDERING SYSTEM PROVIDED HISTORY: fall TECHNOLOGIST PROVIDED HISTORY: Reason for exam:->fall Reason for Exam: fall with pain Acuity: Acute Type of Exam: Initial Mechanism of Injury: fall with pain Relevant Medical/Surgical History: fall with pain; ORDERING SYSTEM PROVIDED HISTORY: fall w/pain TECHNOLOGIST PROVIDED HISTORY: Reason for exam:->fall w/pain Reason for Exam: fall w/pain Acuity: Acute Type of Exam: Initial Mechanism of Injury: fall w/pain Relevant Medical/Surgical History: fall w/pain FINDINGS: Left humerus: Four views of the left humerus were reviewed. No acute fracture identified. Subtle amorphous calcification along the greater tuberosity of the humerus consistent with hydroxyapatite deposition and calcific tendinosis. Mild glenohumeral and mild to moderate left acromioclavicular osteoarthritis. Remote deformity of the distal humerus consistent with remote healed fracture in the supracondylar region of the humerus. Left elbow: Three views of the left elbow demonstrate a remote deformity of the distal humerus consistent with remote supracondylar fracture. No acute fracture identified. Alignment appears grossly anatomic. Left forearm: Three views of the left forearm were reviewed. No acute fracture identified. Alignment appears grossly anatomic. Left wrist: Three views of the left wrist demonstrate well corticated ossicle at the ulnar styloid consistent with sequela of remote trauma. Severe advanced osteoarthritis of the 1st carpometacarpal joint. Mild degenerative change of the triscaphe joint. Benign-appearing cystic change of the distal pole of the scaphoid has the appearance of a degenerative type cyst. Degenerative cystic change also noted at the lunate. 1. No acute fracture of the left humerus, left elbow, left forearm, or left wrist identified. 2. Remote posttraumatic findings as described above. XR ELBOW LEFT (MIN 3 VIEWS)    Result Date: 12/5/2021  EXAMINATION: TWO XRAY VIEWS OF THE LEFT HUMERUS; 3 XRAY VIEWS OF THE LEFT WRIST; TWO XRAY VIEWS OF THE LEFT FOREARM; THREE XRAY VIEWS OF THE LEFT ELBOW 12/5/2021 7:46 pm COMPARISON: None.  HISTORY: ORDERING SYSTEM PROVIDED HISTORY: fall with pain TECHNOLOGIST PROVIDED HISTORY: Reason for exam:->fall with pain Reason for Exam: fall with pain Acuity: Acute Type of Exam: Initial Mechanism of Injury: fall with pain Relevant Medical/Surgical History: fall with pain; ORDERING SYSTEM PROVIDED HISTORY: fall TECHNOLOGIST PROVIDED HISTORY: Reason for exam:->fall Reason for Exam: fall with pain Acuity: Acute Type of Exam: Initial Mechanism of Injury: fall with pain Relevant Medical/Surgical History: fall with pain; ORDERING SYSTEM PROVIDED HISTORY: fall w/pain TECHNOLOGIST PROVIDED HISTORY: Reason for exam:->fall w/pain Reason for Exam: fall w/pain Acuity: Acute Type of Exam: Initial Mechanism of Injury: fall w/pain Relevant Medical/Surgical History: fall w/pain FINDINGS: Left humerus: Four views of the left humerus were reviewed. No acute fracture identified. Subtle amorphous calcification along the greater tuberosity of the humerus consistent with hydroxyapatite deposition and calcific tendinosis. Mild glenohumeral and mild to moderate left acromioclavicular osteoarthritis. Remote deformity of the distal humerus consistent with remote healed fracture in the supracondylar region of the humerus. Left elbow: Three views of the left elbow demonstrate a remote deformity of the distal humerus consistent with remote supracondylar fracture. No acute fracture identified. Alignment appears grossly anatomic. Left forearm: Three views of the left forearm were reviewed. No acute fracture identified. Alignment appears grossly anatomic. Left wrist: Three views of the left wrist demonstrate well corticated ossicle at the ulnar styloid consistent with sequela of remote trauma. Severe advanced osteoarthritis of the 1st carpometacarpal joint. Mild degenerative change of the triscaphe joint. Benign-appearing cystic change of the distal pole of the scaphoid has the appearance of a degenerative type cyst. Degenerative cystic change also noted at the lunate. 1. No acute fracture of the left humerus, left elbow, left forearm, or left wrist identified. 2. Remote posttraumatic findings as described above. XR RADIUS ULNA LEFT (2 VIEWS)    Result Date: 12/5/2021  EXAMINATION: TWO XRAY VIEWS OF THE LEFT HUMERUS; 3 XRAY VIEWS OF THE LEFT WRIST; TWO XRAY VIEWS OF THE LEFT FOREARM; THREE XRAY VIEWS OF THE LEFT ELBOW 12/5/2021 7:46 pm COMPARISON: None. HISTORY: ORDERING SYSTEM PROVIDED HISTORY: fall with pain TECHNOLOGIST PROVIDED HISTORY: Reason for exam:->fall with pain Reason for Exam: fall with pain Acuity: Acute Type of Exam: Initial Mechanism of Injury: fall with pain Relevant Medical/Surgical History: fall with pain; ORDERING SYSTEM PROVIDED HISTORY: fall TECHNOLOGIST PROVIDED HISTORY: Reason for exam:->fall Reason for Exam: fall with pain Acuity: Acute Type of Exam: Initial Mechanism of Injury: fall with pain Relevant Medical/Surgical History: fall with pain; ORDERING SYSTEM PROVIDED HISTORY: fall w/pain TECHNOLOGIST PROVIDED HISTORY: Reason for exam:->fall w/pain Reason for Exam: fall w/pain Acuity: Acute Type of Exam: Initial Mechanism of Injury: fall w/pain Relevant Medical/Surgical History: fall w/pain FINDINGS: Left humerus: Four views of the left humerus were reviewed. No acute fracture identified. Subtle amorphous calcification along the greater tuberosity of the humerus consistent with hydroxyapatite deposition and calcific tendinosis. Mild glenohumeral and mild to moderate left acromioclavicular osteoarthritis. Remote deformity of the distal humerus consistent with remote healed fracture in the supracondylar region of the humerus. Left elbow: Three views of the left elbow demonstrate a remote deformity of the distal humerus consistent with remote supracondylar fracture. No acute fracture identified. Alignment appears grossly anatomic. Left forearm: Three views of the left forearm were reviewed. No acute fracture identified. Alignment appears grossly anatomic.  Left wrist: Three views of the left wrist demonstrate well corticated ossicle at the ulnar styloid consistent with sequela of remote trauma. Severe advanced osteoarthritis of the 1st carpometacarpal joint. Mild degenerative change of the triscaphe joint. Benign-appearing cystic change of the distal pole of the scaphoid has the appearance of a degenerative type cyst. Degenerative cystic change also noted at the lunate. 1. No acute fracture of the left humerus, left elbow, left forearm, or left wrist identified. 2. Remote posttraumatic findings as described above. XR WRIST LEFT (MIN 3 VIEWS)    Result Date: 12/5/2021  EXAMINATION: TWO XRAY VIEWS OF THE LEFT HUMERUS; 3 XRAY VIEWS OF THE LEFT WRIST; TWO XRAY VIEWS OF THE LEFT FOREARM; THREE XRAY VIEWS OF THE LEFT ELBOW 12/5/2021 7:46 pm COMPARISON: None. HISTORY: ORDERING SYSTEM PROVIDED HISTORY: fall with pain TECHNOLOGIST PROVIDED HISTORY: Reason for exam:->fall with pain Reason for Exam: fall with pain Acuity: Acute Type of Exam: Initial Mechanism of Injury: fall with pain Relevant Medical/Surgical History: fall with pain; ORDERING SYSTEM PROVIDED HISTORY: fall TECHNOLOGIST PROVIDED HISTORY: Reason for exam:->fall Reason for Exam: fall with pain Acuity: Acute Type of Exam: Initial Mechanism of Injury: fall with pain Relevant Medical/Surgical History: fall with pain; ORDERING SYSTEM PROVIDED HISTORY: fall w/pain TECHNOLOGIST PROVIDED HISTORY: Reason for exam:->fall w/pain Reason for Exam: fall w/pain Acuity: Acute Type of Exam: Initial Mechanism of Injury: fall w/pain Relevant Medical/Surgical History: fall w/pain FINDINGS: Left humerus: Four views of the left humerus were reviewed. No acute fracture identified. Subtle amorphous calcification along the greater tuberosity of the humerus consistent with hydroxyapatite deposition and calcific tendinosis. Mild glenohumeral and mild to moderate left acromioclavicular osteoarthritis. Remote deformity of the distal humerus consistent with remote healed fracture in the supracondylar region of the humerus. Left elbow:  Three views of the left elbow demonstrate a remote deformity of the distal humerus consistent with remote supracondylar fracture. No acute fracture identified. Alignment appears grossly anatomic. Left forearm: Three views of the left forearm were reviewed. No acute fracture identified. Alignment appears grossly anatomic. Left wrist: Three views of the left wrist demonstrate well corticated ossicle at the ulnar styloid consistent with sequela of remote trauma. Severe advanced osteoarthritis of the 1st carpometacarpal joint. Mild degenerative change of the triscaphe joint. Benign-appearing cystic change of the distal pole of the scaphoid has the appearance of a degenerative type cyst. Degenerative cystic change also noted at the lunate. 1. No acute fracture of the left humerus, left elbow, left forearm, or left wrist identified. 2. Remote posttraumatic findings as described above. Procedures/EKG:       ED Course and MDM   In brief, Summer Oropeza is a 77 y.o. male who presented to the emergency department after a fall associated with  A hypoglycemic episode. PE wo evidence of frature and xrays are negative at this time. Given his pain, he was given a splint and asked to follow up in two weeks for a repeat xray with his pcp. Given return precautions and follow up instructions. No evidence of occult schaphoid fracture at this time. ED Medication Orders (From admission, onward)    Start Ordered     Status Ordering Provider    12/05/21 2245 12/05/21 2238  oxyCODONE (ROXICODONE) immediate release tablet 5 mg  PRIYA Willoughby    12/05/21 1945 12/05/21 1936  oxyCODONE (ROXICODONE) immediate release tablet 5 mg  ONCE         Last MAR action: Given - by Nolan Ladd on 12/05/21 at [address] [place] [address] 9    12/05/21 1945 12/05/21 1936  acetaminophen (TYLENOL) tablet 1,000 mg  ONCE         Last MAR action: Given - by Nolan Ladd on 12/05/21 at [address] [place] [address], [facility]          Final Impression      1.  Sprain of left wrist, initial encounter      DISPOSITION Decision To Discharge 12/05/2021 10:41:24 PM     (Please note that portions of this note may have been completed with a voice recognition program. Efforts were made to edit the dictations but occasionally words are mis-transcribed.)    Patsy Tran MD  [id number] Jason Wong MD  12/16/21 2037

## 2021-12-08 ENCOUNTER — OFFICE VISIT (OUTPATIENT)
Dept: ORTHOPEDIC SURGERY | Age: 66
End: 2021-12-08
Payer: MEDICARE

## 2021-12-08 VITALS
HEIGHT: 66 IN | HEART RATE: 74 BPM | WEIGHT: 215 LBS | OXYGEN SATURATION: 98 % | BODY MASS INDEX: 34.55 KG/M2 | RESPIRATION RATE: 16 BRPM

## 2021-12-08 DIAGNOSIS — S63.502A SPRAIN OF LEFT WRIST, INITIAL ENCOUNTER: Primary | ICD-10-CM

## 2021-12-08 LAB
EKG ATRIAL RATE: 90 BPM
EKG DIAGNOSIS: NORMAL
EKG P AXIS: 13 DEGREES
EKG P-R INTERVAL: 148 MS
EKG Q-T INTERVAL: 336 MS
EKG QRS DURATION: 86 MS
EKG QTC CALCULATION (BAZETT): 411 MS
EKG R AXIS: -20 DEGREES
EKG T AXIS: 15 DEGREES
EKG VENTRICULAR RATE: 90 BPM

## 2021-12-08 PROCEDURE — 99203 OFFICE O/P NEW LOW 30 MIN: CPT | Performed by: PHYSICIAN ASSISTANT

## 2021-12-08 RX ORDER — OXYCODONE HYDROCHLORIDE AND ACETAMINOPHEN 5; 325 MG/1; MG/1
1 TABLET ORAL EVERY 8 HOURS PRN
Qty: 21 TABLET | Refills: 0 | Status: SHIPPED | OUTPATIENT
Start: 2021-12-08 | End: 2021-12-15

## 2021-12-08 RX ORDER — OXYCODONE HYDROCHLORIDE AND ACETAMINOPHEN 5; 325 MG/1; MG/1
1 TABLET ORAL EVERY 8 HOURS PRN
Qty: 15 TABLET | Refills: 0 | Status: SHIPPED | OUTPATIENT
Start: 2021-12-08 | End: 2021-12-08 | Stop reason: CLARIF

## 2021-12-08 ASSESSMENT — ENCOUNTER SYMPTOMS
GASTROINTESTINAL NEGATIVE: 1
RESPIRATORY NEGATIVE: 1
EYES NEGATIVE: 1

## 2021-12-08 NOTE — PATIENT INSTRUCTIONS
Velcro wrist brace given for the left wrist  Continue to weight bear as tolerated  Continue range of motion  Ice and elevate as needed  Tylenol or Motrin for pain  Follow up after the MRI is completed

## 2021-12-08 NOTE — PROGRESS NOTES
 H/O cardiac catheterization 2/5/2013 2/5/2013-Severe 3 vessel CAD-recomm CABG- Dr Tucker Has - report in [facility]    H/O cardiovascular stress test 2/4/2013 2/4/2013-mod ischemia inferior wall. EF 53%. LVSF normal-Dr Justen Bender - report in epic    H/O cardiovascular stress test 1/16/2015    treadmill    H/O Doppler ultrasound 2/6/2013    CAROTID DOPPLER-2/6/2013-There is no hemodynamic significant stenosis present.estimated 16-49 % stenosis of both internal carotid arteries. - report in epic    H/O echocardiogram 03/08/2013    CP32-60%, LV systolic function is borderline reduced, , abnormal LV diastolic function Stage 1, septal motion is consistent with post-operative state. , no pericardial effusion.  Heart attack ([address] 1993    History of cardiovascular stress test 03/13/2017    Normal perfusion study with normal distribution in all coronal, short, and horizontal axis. Normal LV function & wall motion. LVEF is 55 %    History of echocardiogram 03/13/2017    Normal LV systolic functionEjection fraction is visually estimated at 55%. Mild concentric left ventricular hypertrophy. Grade I diastolic dysfunction. No regional wall motion abnormalites. The left atrium is mildly dilated. No significant valvular disease noted. Mild tricuspid regurgitation. No evidence of pericardial effusion.  History of exercise stress test 03/05/2020    Treadmill,  Physiological BP response to  exercise   ETT negative for Ischemia / Arrhythmia.     History of PTCA 11/2010 1 stent 12/2010 2 stents    stents 3 mid lad prox lad & rca    History of shingles Last Episode In 2000's    \"Face, Torso\"    Hx of Carotid Doppler 03/04/2019    Mild (0-49%) disease of the bilateral internal carotid, significant stenosis of the bilateral external carotid arteries L>R, Normal right vertebral flow, left vertebral flow non visualized    Hyperlipidemia     Hypertension     Kidney stone 1990's    Kidney Stone Surgery    S/P CABG x 4 2/8/2013 2/8/2013-LIMA to LAD; VG to OM;VG to Diagonal; VG to RCA -Dr Jocelynn Piña- report in [facility]    Shinpaulosunita Patient states that he started Valtrex for this 6/30/14.     Shortness of breath on exertion     Wears glasses        Past Surgical History:   Procedure Laterality Date    CARDIAC CATHETERIZATION  2/5/2013 2/5/2013-Severe 3 vessel disease- Recomm CABG- report in epic   [address]  2-8-13    CABG (4 Bypasses)    COLONOSCOPY  Last Done 10-8-15    CORONARY ANGIOPLASTY  11-10    One Heart Stent    CORONARY ANGIOPLASTY  1-11-11    Two Heart Stents    CORONARY ARTERY BYPASS GRAFT  2/8/2013 2/8/2013- CABG X 4-LIMA to LAD; VG to OM;VG to Diagonal; VG to RCA -Dr Himanshu Proctor      All Teeth Extracted In Past    ENDOSCOPY, COLON, DIAGNOSTIC  03/28/2017    normal esophagus, gastritis found, moderate amount of food particles in the stomach    INGUINAL HERNIA REPAIR Left [address]  1990's    KNEE ARTHROSCOPY Left 5/26/16    Partial medial and lateral meniscectomy & chondroplasty       Family History   Problem Relation Age of Onset    Heart Disease Mother         Heart Attack    Early Death Mother         Heart Attack    Early Death Brother         64 Or 62    Substance Abuse Brother         Alcoholic    Early Death Father 48        \"He Drank Himself To Death\"    Substance Abuse Father         Alcohol    Cirrhosis Father     Early Death Sister 2        Leukemia    Cancer Sister         Leukemia    Early Death Sister 61        Stroke    Stroke Sister     Arthritis Sister     Heart Disease Sister     Vision Loss Sister         \"RP\"       Social History     Socioeconomic History    Marital status:      Spouse name: None    Number of children: None    Years of education: None    Highest education level: None   Occupational History    None   Tobacco Use    Smoking status: Never Smoker    Smokeless tobacco: Never Used   Substance and Sexual Activity    Alcohol use: No     Alcohol/week: 0.0 standard drinks    Drug use: Yes     Types: Marijuana Rianna Bell)     Comment: \"Use Marijuana About Every Other Day\"last 3-27-17    Sexual activity: Never   Other Topics Concern    None   Social History Narrative    None     Social Determinants of Health     Financial Resource Strain:     Difficulty of Paying Living Expenses: Not on file   Food Insecurity:     Worried About Running Out of Food in the Last Year: Not on file    Gui of Food in the Last Year: Not on file   Transportation Needs:     Lack of Transportation (Medical): Not on file    Lack of Transportation (Non-Medical): Not on file   Physical Activity:     Days of Exercise per Week: Not on file    Minutes of Exercise per Session: Not on file   Stress:     Feeling of Stress : Not on file   Social Connections:     Frequency of Communication with Friends and Family: Not on file    Frequency of Social Gatherings with Friends and Family: Not on file    Attends Latter day Services: Not on file    Active Member of [address] [place] [address] or Organizations: Not on file    Attends Club or Organization Meetings: Not on file    Marital Status: Not on file   Intimate Partner Violence:     Fear of Current or Ex-Partner: Not on file    Emotionally Abused: Not on file    Physically Abused: Not on file    Sexually Abused: Not on file   Housing Stability:     Unable to Pay for Housing in the Last Year: Not on file    Number of [place] in the Last Year: Not on file    Unstable Housing in the Last Year: Not on file       Current Outpatient Medications   Medication Sig Dispense Refill    PERCOCET 5-325 MG per tablet Take 1 tablet by mouth every 8 hours as needed for Pain for up to 7 days. Intended supply: 7 days.  Take lowest dose possible to manage pain 21 tablet 0    acetaminophen (AMINOFEN) 325 MG tablet Take 2 tablets by mouth every 6 hours as needed for Pain 120 tablet 0    docusate sodium (COLACE) 100 MG capsule Take 1 capsule by mouth 2 times daily 100 capsule 0    TERAZOSIN HCL PO Take by mouth      isosorbide mononitrate (IMDUR) 30 MG extended release tablet Take 1 tablet by mouth 2 times daily 60 tablet 5    amLODIPine (NORVASC) 5 MG tablet Take 5 mg by mouth daily      atorvastatin (LIPITOR) 10 MG tablet Take 1 tablet by mouth daily 90 tablet 3    lisinopril (PRINIVIL;ZESTRIL) 40 MG tablet Take 1 tablet by mouth daily 90 tablet 3    hydrochlorothiazide (HYDRODIURIL) 25 MG tablet   5    BASAGLAR KWIKPEN 100 UNIT/ML injection pen   6    metFORMIN (GLUCOPHAGE) 1000 MG tablet Take 1,000 mg by mouth 2 times daily (with meals)      aspirin EC 81 MG EC tablet Take 1 tablet by mouth daily 90 tablet 3    carvedilol (COREG) 25 MG tablet Take 1 tablet by mouth 2 times daily 180 tablet 3    Insulin Aspart (NOVOLOG FLEXPEN SC) Inject 20 Units into the skin Per Sliding Scale        No current facility-administered medications for this visit. Allergies   Allergen Reactions    Pcn [Penicillins] Hives, Itching and Rash    Morphine Itching       Vitals:    12/08/21 1101   Pulse: 74   Resp: 16   SpO2: 98%   Weight: 215 lb (97.5 kg)   Height: 5' 6\" (1.676 m)         Gen/Psych:Examination reveals a pleasant individual in no acute distress. The patient is oriented to time, place and person. The patient's mood and affect are appropriate. Patient appears well nourished. Body habitus is overweight     Head: Head is atraumatic normocephalic,  ears are symmetric,     Eyes: Eyes show equal pupils bilaterally, extraocular muscles intact    Ears:  Hearing is intact to normal voice at 5 feet     Nose: Nares are patent bilaterally, no epistaxis,no rhinorrhea     Lymph: no obvious lymphedema in bilateral upper extremities     Skin intact in bilateral upper extremities with no ulcerations, lesions, rash, erythema. Vascular:  There are no varicosities in bilateral upper  extremities, sensation intact to light touch over bilateral upper extremities. Left Wrist/hand exam  Inspection: Moderate edema and ecchymosis in the volar aspect of the distal radius. No obvious deformity   Palpation: There is moderate to severe tenderness to palpation along the distal radius and the radial shaft. Range of motion:   Extension: Wrist extension extremely limited, 15 degrees at best   Flexion: 20 degrees at best     Median nerve distribution sensation: Decreased sensation to light touch, ulnar nerve distribution sensation and motor function: Intact, radial nerve sensation and motor function: Intact  2+ radial pulse, capillary refill less than 3 seconds      Outside Record review: ER note reviewed, imaging studies reviewed. Imaging: X-rays and CT scan of the left wrist were reviewed.   Impression   No acute displaced fracture identified.  Small calcific fragment measuring 3   mm is seen at the anterior lip of the distal radius near the articular   surface, with concavity of the adjacent radius.  This could represent an age   indeterminate, potentially chronic, fracture.  This could be further assessed   with MRI.  Alternatively, consider short-term follow-up radiographs.       Chronic nonunited ulnar styloid process fracture.       Severe 1st CMC joint degenerative changes.  Mild to moderate triscaphe joint   degenerative changes.       Intraosseous cysts are seen throughout the wrist.       There are calcifications involving the scapholunate and lunotriquetral   ligaments.  This can be associated with CPPD arthropathy         Assessment: Left wrist sprain (possible occult fracture)    Plan:  Patient Instructions   Velcro wrist brace given for the left wrist  Continue to weight bear as tolerated  Continue range of motion  Ice and elevate as needed  Tylenol or Motrin for pain  Follow up after the MRI is completed

## 2021-12-17 ENCOUNTER — HOSPITAL ENCOUNTER (OUTPATIENT)
Dept: MRI IMAGING | Age: 66
Discharge: HOME | End: 2021-12-17

## 2021-12-17 DIAGNOSIS — S63.502A SPRAIN OF LEFT WRIST, INITIAL ENCOUNTER: ICD-10-CM

## 2021-12-27 ENCOUNTER — OFFICE VISIT (OUTPATIENT)
Dept: ORTHOPEDIC SURGERY | Age: 66
End: 2021-12-27
Payer: MEDICARE

## 2021-12-27 VITALS
HEIGHT: 66 IN | HEART RATE: 88 BPM | BODY MASS INDEX: 34.55 KG/M2 | RESPIRATION RATE: 16 BRPM | WEIGHT: 215 LBS | OXYGEN SATURATION: 98 %

## 2021-12-27 DIAGNOSIS — S63.502A SPRAIN OF LEFT WRIST, INITIAL ENCOUNTER: Primary | ICD-10-CM

## 2021-12-27 PROCEDURE — 99212 OFFICE O/P EST SF 10 MIN: CPT | Performed by: PHYSICIAN ASSISTANT

## 2021-12-27 RX ORDER — HYDROCODONE BITARTRATE AND ACETAMINOPHEN 5; 325 MG/1; MG/1
1 TABLET ORAL EVERY 8 HOURS PRN
Qty: 21 TABLET | Refills: 0 | Status: SHIPPED | OUTPATIENT
Start: 2021-12-27 | End: 2022-01-03

## 2021-12-27 NOTE — PROGRESS NOTES
Review of Systems   Constitutional: Negative. HENT: Negative. Eyes: Negative. Respiratory: Negative. Cardiovascular: Negative. Gastrointestinal: Negative. Genitourinary: Negative. Musculoskeletal: Positive for arthralgias, joint swelling and myalgias. Skin: Negative. Negative for rash and wound. Neurological: Negative. Psychiatric/Behavioral: Negative. HPI:  Sánchez Domínguez is a 74 y.o. male that returns the office today with persistent left wrist and forearm pain. This started as a result of a fall when his blood sugar dropped. He states he feels like there is a vice  on his forearm. He did not get the MRI because he was unable to lay in the position that they wanted him to. He is not any better than he was prior to his last visit. Past Medical History:   Diagnosis Date    Angina at rest [facility])     Arthritis     \"Hands, Knees, Shoulders\"    CAD (coronary artery disease)     Sees Dr. Carrington Suazo Carotid  Doppler 03/13/2017    Duplex sonography with color flow enhancement was performed bilaterally on the cervical carotid system. No evidence of hemodynamically significant stenosis in the bilateral internal carotid arteries. There is evidence of hemodynamically significant stenosis of the bilateral external carotid arteries, L > R.    Cellulitis 2/2/2013    5th finger-Left hand-ER visit -report in Epic    Chronic back pain     Diabetes mellitus ([address] Dx 2007    Fractured rib Dx 9-13    Full dentures     Gonorrhea Dx 18's    \"Had Treatment\"    H/O cardiac catheterization 2/5/2013 2/5/2013-Severe 3 vessel CAD-recomm CABG- Dr Emilio Goddard - report in epic    H/O cardiovascular stress test 2/4/2013 2/4/2013-mod ischemia inferior wall. EF 53%.  LVSF normal-Dr Argelia Galaviz - report in epic    H/O cardiovascular stress test 1/16/2015    treadmill    H/O Doppler ultrasound 2/6/2013    CAROTID DOPPLER-2/6/2013-There is no hemodynamic significant stenosis (4 Bypasses)    COLONOSCOPY  Last Done 10-8-15    CORONARY ANGIOPLASTY  11-10    One Heart Stent    CORONARY ANGIOPLASTY  1-11-11    Two Heart Stents    CORONARY ARTERY BYPASS GRAFT  2/8/2013 2/8/2013- CABG X 4-LIMA to LAD; VG to OM;VG to Diagonal; VG to RCA -Dr Cain Lema      All Teeth Extracted In Past    ENDOSCOPY, COLON, DIAGNOSTIC  03/28/2017    normal esophagus, gastritis found, moderate amount of food particles in the stomach    INGUINAL HERNIA REPAIR Left [address]  1990's    KNEE ARTHROSCOPY Left 5/26/16    Partial medial and lateral meniscectomy & chondroplasty       Family History   Problem Relation Age of Onset    Heart Disease Mother         Heart Attack    Early Death Mother         Heart Attack    Early Death Brother         64 Or 62    Substance Abuse Brother         Alcoholic    Early Death Father 48        \"He Drank Himself To Death\"    Substance Abuse Father         Alcohol    Cirrhosis Father     Early Death Sister 2        Leukemia    Cancer Sister         Leukemia    Early Death Sister 61        Stroke    Stroke Sister     Arthritis Sister     Heart Disease Sister     Vision Loss Sister         \"RP\"       Social History     Socioeconomic History    Marital status:      Spouse name: None    Number of children: None    Years of education: None    Highest education level: None   Occupational History    None   Tobacco Use    Smoking status: Never Smoker    Smokeless tobacco: Never Used   Substance and Sexual Activity    Alcohol use: No     Alcohol/week: 0.0 standard drinks    Drug use: Yes     Types: Marijuana Cheyenne Davenport     Comment: \"Use Marijuana About Every Other Day\"last 3-27-17    Sexual activity: Never   Other Topics Concern    None   Social History Narrative    None     Social Determinants of Health     Financial Resource Strain:     Difficulty of Paying Living Expenses: Not on file   Food Insecurity:     Worried About Running Out of Food in the Last Year: Not on file    Ran Out of Food in the Last Year: Not on file   Transportation Needs:     Lack of Transportation (Medical): Not on file    Lack of Transportation (Non-Medical): Not on file   Physical Activity:     Days of Exercise per Week: Not on file    Minutes of Exercise per Session: Not on file   Stress:     Feeling of Stress : Not on file   Social Connections:     Frequency of Communication with Friends and Family: Not on file    Frequency of Social Gatherings with Friends and Family: Not on file    Attends Cheondoism Services: Not on file    Active Member of [address] [place] [address] [employer] or Organizations: Not on file    Attends Club or Organization Meetings: Not on file    Marital Status: Not on file   Intimate Partner Violence:     Fear of Current or Ex-Partner: Not on file    Emotionally Abused: Not on file    Physically Abused: Not on file    Sexually Abused: Not on file   Housing Stability:     Unable to Pay for Housing in the Last Year: Not on file    Number of [place] in the Last Year: Not on file    Unstable Housing in the Last Year: Not on file       Current Outpatient Medications   Medication Sig Dispense Refill    HYDROcodone-acetaminophen (NORCO) 5-325 MG per tablet Take 1 tablet by mouth every 8 hours as needed for Pain for up to 7 days. Intended supply: 7 days.  Take lowest dose possible to manage pain 21 tablet 0    acetaminophen (AMINOFEN) 325 MG tablet Take 2 tablets by mouth every 6 hours as needed for Pain 120 tablet 0    docusate sodium (COLACE) 100 MG capsule Take 1 capsule by mouth 2 times daily 100 capsule 0    TERAZOSIN HCL PO Take by mouth      isosorbide mononitrate (IMDUR) 30 MG extended release tablet Take 1 tablet by mouth 2 times daily 60 tablet 5    amLODIPine (NORVASC) 5 MG tablet Take 5 mg by mouth daily      atorvastatin (LIPITOR) 10 MG tablet Take 1 tablet by mouth daily 90 tablet 3    lisinopril (PRINIVIL;ZESTRIL) 40 MG tablet Take 1 tablet by mouth daily 90 tablet 3    hydrochlorothiazide (HYDRODIURIL) 25 MG tablet   5    BASAGLAR KWIKPEN 100 UNIT/ML injection pen   6    metFORMIN (GLUCOPHAGE) 1000 MG tablet Take 1,000 mg by mouth 2 times daily (with meals)      aspirin EC 81 MG EC tablet Take 1 tablet by mouth daily 90 tablet 3    carvedilol (COREG) 25 MG tablet Take 1 tablet by mouth 2 times daily 180 tablet 3    Insulin Aspart (NOVOLOG FLEXPEN SC) Inject 20 Units into the skin Per Sliding Scale        No current facility-administered medications for this visit. Allergies   Allergen Reactions    Pcn [Penicillins] Hives, Itching and Rash    Morphine Itching       Vitals:    12/27/21 1410   Pulse: 88   Resp: 16   SpO2: 98%   Weight: 215 lb (97.5 kg)   Height: 5' 6\" (1.676 m)         Gen/Psych:Examination reveals a pleasant individual in no acute distress. The patient is oriented to time, place and person. The patient's mood and affect are appropriate. Patient appears well nourished. Body habitus is overweight     Head: Head is atraumatic normocephalic,  ears are symmetric,     Eyes: Eyes show equal pupils bilaterally, extraocular muscles intact    Ears:  Hearing is intact to normal voice at 5 feet     Nose: Nares are patent bilaterally, no epistaxis,no rhinorrhea     Lymph: no obvious lymphedema in bilateral upper extremities     Skin intact in bilateral upper extremities with no ulcerations, lesions, rash, erythema. Vascular: There are no varicosities in bilateral upper  extremities, sensation intact to light touch over bilateral upper extremities. Left Wrist/hand exam  Inspection: Ecchymosis has resolved and there is mild edema present. Palpation: There is moderate to severe tenderness to palpation along the distal radius and the radial shaft.   Range of motion:   Extension: Wrist extension extremely limited, 15 degrees at best   Flexion: 20 degrees at best     Median nerve distribution sensation: Decreased sensation to light touch, ulnar nerve distribution sensation and motor function: Intact, radial nerve sensation and motor function: Intact  2+ radial pulse, capillary refill less than 3 seconds      Outside Record review: ER note reviewed, imaging studies reviewed. Imaging: X-rays and CT scan of the left wrist were reviewed.   Impression   No acute displaced fracture identified.  Small calcific fragment measuring 3   mm is seen at the anterior lip of the distal radius near the articular   surface, with concavity of the adjacent radius.  This could represent an age   indeterminate, potentially chronic, fracture.  This could be further assessed   with MRI.  Alternatively, consider short-term follow-up radiographs.       Chronic nonunited ulnar styloid process fracture.       Severe 1st CMC joint degenerative changes.  Mild to moderate triscaphe joint   degenerative changes.       Intraosseous cysts are seen throughout the wrist.       There are calcifications involving the scapholunate and lunotriquetral   ligaments.  This can be associated with CPPD arthropathy         Assessment: Left wrist sprain (possible occult fracture)    Plan:  Patient Instructions   MRI ordered  Central Scheduling [phone]  Weightbearing and activities as tolerated  May take Ibuprofen or Motrin as needed  Rest, ice, and elevate as needed  Follow up when MRI is complete

## 2021-12-27 NOTE — PATIENT INSTRUCTIONS
MRI ordered  Central Scheduling [phone]  Weightbearing and activities as tolerated  May take Ibuprofen or Motrin as needed  Rest, ice, and elevate as needed  Follow up when MRI is complete

## 2021-12-29 ENCOUNTER — HOSPITAL ENCOUNTER (OUTPATIENT)
Dept: MRI IMAGING | Age: 66
Discharge: HOME | End: 2021-12-29
Payer: MEDICARE

## 2021-12-29 PROCEDURE — 73221 MRI JOINT UPR EXTREM W/O DYE: CPT

## 2022-01-03 ENCOUNTER — APPOINTMENT (OUTPATIENT)
Dept: GENERAL RADIOLOGY | Age: 67
End: 2022-01-03
Payer: MEDICARE

## 2022-01-03 ENCOUNTER — HOSPITAL ENCOUNTER (EMERGENCY)
Age: 67
Discharge: HOME | End: 2022-01-03
Attending: EMERGENCY MEDICINE
Payer: MEDICARE

## 2022-01-03 ENCOUNTER — APPOINTMENT (OUTPATIENT)
Dept: CT IMAGING | Age: 67
End: 2022-01-03
Payer: MEDICARE

## 2022-01-03 VITALS
TEMPERATURE: 98.7 F | SYSTOLIC BLOOD PRESSURE: 155 MMHG | RESPIRATION RATE: 18 BRPM | DIASTOLIC BLOOD PRESSURE: 83 MMHG | HEIGHT: 66 IN | OXYGEN SATURATION: 97 % | BODY MASS INDEX: 34.55 KG/M2 | WEIGHT: 215 LBS | HEART RATE: 78 BPM

## 2022-01-03 DIAGNOSIS — M25.532 LEFT WRIST PAIN: ICD-10-CM

## 2022-01-03 DIAGNOSIS — M65.9 TENOSYNOVITIS OF LEFT WRIST: ICD-10-CM

## 2022-01-03 DIAGNOSIS — R20.2 LEFT HAND PARESTHESIA: Primary | ICD-10-CM

## 2022-01-03 LAB
ALBUMIN SERPL-MCNC: 3.9 GM/DL (ref 3.4–5)
ALP BLD-CCNC: 89 IU/L (ref 40–129)
ALT SERPL-CCNC: 18 U/L (ref 10–40)
ANION GAP SERPL CALCULATED.3IONS-SCNC: 12 MMOL/L (ref 4–16)
AST SERPL-CCNC: 17 IU/L (ref 15–37)
BASOPHILS ABSOLUTE: 0.1 K/CU MM
BASOPHILS RELATIVE PERCENT: 0.4 % (ref 0–1)
BILIRUB SERPL-MCNC: 0.3 MG/DL (ref 0–1)
BUN BLDV-MCNC: 18 MG/DL (ref 6–23)
CALCIUM SERPL-MCNC: 9.4 MG/DL (ref 8.3–10.6)
CHLORIDE BLD-SCNC: 98 MMOL/L (ref 99–110)
CO2: 26 MMOL/L (ref 21–32)
CREAT SERPL-MCNC: 1.1 MG/DL (ref 0.9–1.3)
DIFFERENTIAL TYPE: ABNORMAL
EKG ATRIAL RATE: 75 BPM
EKG DIAGNOSIS: NORMAL
EKG P AXIS: 15 DEGREES
EKG P-R INTERVAL: 146 MS
EKG Q-T INTERVAL: 354 MS
EKG QRS DURATION: 88 MS
EKG QTC CALCULATION (BAZETT): 395 MS
EKG R AXIS: -23 DEGREES
EKG T AXIS: 23 DEGREES
EKG VENTRICULAR RATE: 75 BPM
EOSINOPHILS ABSOLUTE: 0.6 K/CU MM
EOSINOPHILS RELATIVE PERCENT: 4.7 % (ref 0–3)
GFR AFRICAN AMERICAN: >60 ML/MIN/1.73M2
GFR NON-AFRICAN AMERICAN: >60 ML/MIN/1.73M2
GLUCOSE BLD-MCNC: 105 MG/DL (ref 70–99)
HCT VFR BLD CALC: 43.1 % (ref 42–52)
HEMOGLOBIN: 13.9 GM/DL (ref 13.5–18)
IMMATURE NEUTROPHIL %: 0.6 % (ref 0–0.43)
INR BLD: 0.81 INDEX
LYMPHOCYTES ABSOLUTE: 2.5 K/CU MM
LYMPHOCYTES RELATIVE PERCENT: 21 % (ref 24–44)
MCH RBC QN AUTO: 31 PG (ref 27–31)
MCHC RBC AUTO-ENTMCNC: 32.3 % (ref 32–36)
MCV RBC AUTO: 96 FL (ref 78–100)
MONOCYTES ABSOLUTE: 1.4 K/CU MM
MONOCYTES RELATIVE PERCENT: 11.3 % (ref 0–4)
NUCLEATED RBC %: 0 %
PDW BLD-RTO: 12 % (ref 11.7–14.9)
PLATELET # BLD: 184 K/CU MM (ref 140–440)
PMV BLD AUTO: 11.7 FL (ref 7.5–11.1)
POTASSIUM SERPL-SCNC: 4.2 MMOL/L (ref 3.5–5.1)
PRO-BNP: 309.7 PG/ML
PROTHROMBIN TIME: 10.4 SECONDS (ref 11.7–14.5)
RBC # BLD: 4.49 M/CU MM (ref 4.6–6.2)
SEGMENTED NEUTROPHILS ABSOLUTE COUNT: 7.4 K/CU MM
SEGMENTED NEUTROPHILS RELATIVE PERCENT: 62 % (ref 36–66)
SODIUM BLD-SCNC: 136 MMOL/L (ref 135–145)
TOTAL IMMATURE NEUTOROPHIL: 0.07 K/CU MM
TOTAL NUCLEATED RBC: 0 K/CU MM
TOTAL PROTEIN: 7.1 GM/DL (ref 6.4–8.2)
TROPONIN T: <0.01 NG/ML
WBC # BLD: 12 K/CU MM (ref 4–10.5)

## 2022-01-03 PROCEDURE — 93005 ELECTROCARDIOGRAM TRACING: CPT | Performed by: EMERGENCY MEDICINE

## 2022-01-03 PROCEDURE — 80053 COMPREHEN METABOLIC PANEL: CPT

## 2022-01-03 PROCEDURE — 73030 X-RAY EXAM OF SHOULDER: CPT

## 2022-01-03 PROCEDURE — 83880 ASSAY OF NATRIURETIC PEPTIDE: CPT

## 2022-01-03 PROCEDURE — 73110 X-RAY EXAM OF WRIST: CPT

## 2022-01-03 PROCEDURE — 84484 ASSAY OF TROPONIN QUANT: CPT

## 2022-01-03 PROCEDURE — 71045 X-RAY EXAM CHEST 1 VIEW: CPT

## 2022-01-03 PROCEDURE — 72125 CT NECK SPINE W/O DYE: CPT

## 2022-01-03 PROCEDURE — 99285 EMERGENCY DEPT VISIT HI MDM: CPT

## 2022-01-03 PROCEDURE — 70450 CT HEAD/BRAIN W/O DYE: CPT

## 2022-01-03 PROCEDURE — 93010 ELECTROCARDIOGRAM REPORT: CPT | Performed by: INTERNAL MEDICINE

## 2022-01-03 PROCEDURE — 85025 COMPLETE CBC W/AUTO DIFF WBC: CPT

## 2022-01-03 PROCEDURE — 73130 X-RAY EXAM OF HAND: CPT

## 2022-01-03 PROCEDURE — 85610 PROTHROMBIN TIME: CPT

## 2022-01-03 ASSESSMENT — ENCOUNTER SYMPTOMS
CONSTIPATION: 0
COUGH: 0
SHORTNESS OF BREATH: 0
VOMITING: 0
CHEST TIGHTNESS: 0
RHINORRHEA: 0
DIARRHEA: 0

## 2022-01-03 ASSESSMENT — PAIN DESCRIPTION - LOCATION: LOCATION: CHEST;SHOULDER

## 2022-01-03 ASSESSMENT — PAIN SCALES - GENERAL: PAINLEVEL_OUTOF10: 2

## 2022-01-03 ASSESSMENT — PAIN DESCRIPTION - PAIN TYPE: TYPE: ACUTE PAIN

## 2022-01-03 ASSESSMENT — PAIN DESCRIPTION - ORIENTATION: ORIENTATION: LEFT

## 2022-01-03 ASSESSMENT — PAIN DESCRIPTION - FREQUENCY: FREQUENCY: CONTINUOUS

## 2022-01-03 NOTE — ED PROVIDER NOTES
[address] ENCOUNTER        Pt Name: Cristina Lake  MRN: [id number]  [place] 1955  Date of evaluation: 1/3/2022  Provider: Santos Lowe PA-C  PCP: No primary care provider on file. Note Started: 5:40 PM EST       I have seen and evaluated this patient with my supervising physician Jignesh Walter 113       Chief Complaint   Patient presents with    Hand Pain     numbness since patient woke up at 0500 and discomfort in left side of chest         HISTORY OF PRESENT ILLNESS   (Location/Symptom, Timing/Onset, Context/Setting, Quality, Duration, Modifying Factors, Severity)  Note limiting factors. Chief Complaint: numbness     Cristina Lake is a 74 y.o. male who presents with complaint of left wrist paresthesias, and left wrist pain, that he relates to a fall that he had approximately 1 month ago. He states that he has had wrist pain since that time, he has followed up with sports medicine, but he had awoken today with paresthesias to his fingers, and his hand. He mentions since waking up, the paresthesias are now located over his middle and fourth finger. He mentions he had an MRI as an outpatient ordered by sports medicine, but is unaware of the results. He otherwise denying any other concerns, neck pain, fever, arm swelling, redness    Nursing Notes were all reviewed and agreed with or any disagreements were addressed in the HPI. REVIEW OF SYSTEMS    (2-9 systems for level 4, 10 or more for level 5)     Review of Systems   Constitutional: Negative for chills and fever. HENT: Negative for rhinorrhea. Respiratory: Negative for cough, chest tightness and shortness of breath. Cardiovascular: Negative for chest pain. Gastrointestinal: Negative for constipation, diarrhea and vomiting. Genitourinary: Negative for difficulty urinating.    Musculoskeletal: Positive for arthralgias. Negative for joint swelling, myalgias and neck pain. Skin: Negative for rash. Neurological: Negative for dizziness. Hematological: Negative for adenopathy. PAST MEDICAL HISTORY     Past Medical History:   Diagnosis Date    Angina at rest [facility])     Arthritis     \"Hands, Knees, Shoulders\"    CAD (coronary artery disease)     Sees Dr. Konrad Sanz Carotid  Doppler 03/13/2017    Duplex sonography with color flow enhancement was performed bilaterally on the cervical carotid system. No evidence of hemodynamically significant stenosis in the bilateral internal carotid arteries. There is evidence of hemodynamically significant stenosis of the bilateral external carotid arteries, L > R.    Cellulitis 2/2/2013    5th finger-Left hand-ER visit -report in Epic    Chronic back pain     Diabetes mellitus ([address] Dx 2007    Fractured rib Dx 9-13    Full dentures     Gonorrhea Dx 18's    \"Had Treatment\"    H/O cardiac catheterization 2/5/2013 2/5/2013-Severe 3 vessel CAD-recomm CABG- Dr Helena Engle - report in epic    H/O cardiovascular stress test 2/4/2013 2/4/2013-mod ischemia inferior wall. EF 53%. LVSF normal-Dr Randy Zacarias - [facility]    H/O cardiovascular stress test 1/16/2015    treadmill    H/O Doppler ultrasound 2/6/2013    CAROTID DOPPLER-2/6/2013-There is no hemodynamic significant stenosis present.estimated 16-49 % stenosis of both internal carotid arteries. - report in epic    H/O echocardiogram 03/08/2013    RF43-22%, LV systolic function is borderline reduced, , abnormal LV diastolic function Stage 1, septal motion is consistent with post-operative state. , no pericardial effusion.  Heart attack ([address] 1993    History of cardiovascular stress test 03/13/2017    Normal perfusion study with normal distribution in all coronal, short, and horizontal axis. Normal LV function & wall motion.  LVEF is 55 %    History of echocardiogram 03/13/2017    Normal LV systolic functionEjection fraction is visually estimated at 55%. Mild concentric left ventricular hypertrophy. Grade I diastolic dysfunction. No regional wall motion abnormalites. The left atrium is mildly dilated. No significant valvular disease noted. Mild tricuspid regurgitation. No evidence of pericardial effusion.  History of exercise stress test 03/05/2020    Treadmill,  Physiological BP response to  exercise   ETT negative for Ischemia / Arrhythmia.  History of PTCA 11/2010 1 stent 12/2010 2 stents    stents 3 mid lad prox lad & rca    History of shingles Last Episode In 2000's    \"Face, Torso\"    Hx of Carotid Doppler 03/04/2019    Mild (0-49%) disease of the bilateral internal carotid, significant stenosis of the bilateral external carotid arteries L>R, Normal right vertebral flow, left vertebral flow non visualized    Hyperlipidemia     Hypertension     Kidney stone 1990's    Kidney Stone Surgery    S/P CABG x 4 2/8/2013 2/8/2013-LIMA to LAD; VG to OM;VG to Diagonal; VG to RCA -Dr Yusef Montenegro- report in [facility]    Shingles Patient states that he started Valtrex for this 6/30/14.     Shortness of breath on exertion     Wears glasses        SURGICAL HISTORY     Past Surgical History:   Procedure Laterality Date    CARDIAC CATHETERIZATION  2/5/2013 2/5/2013-Severe 3 vessel disease- Recomm CABG- report in [address] [place] [address]  2-8-13    CABG (4 Bypasses)    COLONOSCOPY  Last Done 10-8-15    CORONARY ANGIOPLASTY  11-10    One Heart Stent    CORONARY ANGIOPLASTY  1-11-11    Two Heart Stents    CORONARY ARTERY BYPASS GRAFT  2/8/2013 2/8/2013- CABG X 4-LIMA to LAD; VG to OM;VG to Diagonal; VG to RCA -Dr Gavino Velez      All Teeth Extracted In Past    ENDOSCOPY, COLON, DIAGNOSTIC  03/28/2017    normal esophagus, gastritis found, moderate amount of food particles in the stomach    INGUINAL HERNIA REPAIR Left 1971    KIDNEY STONE SURGERY  1990's    KNEE ARTHROSCOPY Left 5/26/16 Social History     Socioeconomic History    Marital status:      Spouse name: Not on file    Number of children: Not on file    Years of education: Not on file    Highest education level: Not on file   Occupational History    Not on file   Tobacco Use    Smoking status: Never Smoker    Smokeless tobacco: Never Used   Substance and Sexual Activity    Alcohol use: No     Alcohol/week: 0.0 standard drinks    Drug use: Yes     Types: Marijuana [place] [place])     Comment: \"Use Marijuana About Every Other Day\"last 3-27-17    Sexual activity: Never   Other Topics Concern    Not on file   Social History Narrative    Not on file     Social Determinants of Health     Financial Resource Strain:     Difficulty of Paying Living Expenses: Not on file   Food Insecurity:     Worried About Running Out of Food in the Last Year: Not on file    Gui of Food in the Last Year: Not on file   Transportation Needs:     Lack of Transportation (Medical): Not on file    Lack of Transportation (Non-Medical):  Not on file   Physical Activity:     Days of Exercise per Week: Not on file    Minutes of Exercise per Session: Not on file   Stress:     Feeling of Stress : Not on file   Social Connections:     Frequency of Communication with Friends and Family: Not on file    Frequency of Social Gatherings with Friends and Family: Not on file    Attends Church Services: Not on file    Active Member of [address] [place] [address] or Organizations: Not on file    Attends Club or Organization Meetings: Not on file    Marital Status: Not on file   Intimate Partner Violence:     Fear of Current or Ex-Partner: Not on file    Emotionally Abused: Not on file    Physically Abused: Not on file    Sexually Abused: Not on file   Housing Stability:     Unable to Pay for Housing in the Last Year: Not on file    Number of [place] in the Last Year: Not on file    Unstable Housing in the Last Year: Not on file       SCREENINGS    [place] Coma Scale  Eye Opening: Spontaneous  Best Verbal Response: Oriented  Best Motor Response: Obeys commands  [place] Coma Scale Score: 15        PHYSICAL EXAM    (up to 7 for level 4, 8 or more for level 5)     ED Triage Vitals [01/03/22 0910]   BP Temp Temp src Pulse Resp SpO2 Height Weight   (!) 154/82 98.7 °F (37.1 °C) -- 77 17 96 % 5' 6\" (1.676 m) 215 lb (97.5 kg)       Physical Exam  Vitals and nursing note reviewed. Constitutional:       Appearance: Normal appearance. He is well-developed. He is not ill-appearing or diaphoretic. HENT:      Head: Normocephalic and atraumatic. Right Ear: External ear normal.      Left Ear: External ear normal.      Nose: Nose normal.   Eyes:      General:         Right eye: No discharge. Left eye: No discharge. Pulmonary:      Effort: Pulmonary effort is normal. No respiratory distress. Breath sounds: No stridor. Musculoskeletal:      Right elbow: Normal.      Left elbow: Normal.      Left wrist: Tenderness and bony tenderness present. No swelling, deformity, effusion, lacerations or crepitus. Decreased range of motion. Right hand: Normal.      Left hand: No swelling or tenderness. Normal range of motion. Normal strength. There is no disruption of two-point discrimination. Normal capillary refill. Normal pulse. Cervical back: Normal range of motion and neck supple. Pain with movement present. No spinous process tenderness. Normal range of motion. Lymphadenopathy:      Cervical: No cervical adenopathy. Skin:     General: Skin is warm and dry. Coloration: Skin is not pale. Neurological:      General: No focal deficit present. Mental Status: He is alert and oriented to person, place, and time. Cranial Nerves: No cranial nerve deficit or dysarthria. Sensory: Sensation is intact. Motor: No weakness, tremor, abnormal muscle tone or pronator drift. Coordination: Romberg sign negative.  Coordination normal.   Psychiatric: Mood and Affect: Mood normal.         Behavior: Behavior normal.         DIAGNOSTIC RESULTS   LABS:    Labs Reviewed   CBC WITH AUTO DIFFERENTIAL - Abnormal; Notable for the following components:       Result Value    WBC 12.0 (*)     RBC 4.49 (*)     MPV 11.7 (*)     Lymphocytes % 21.0 (*)     Monocytes % 11.3 (*)     Eosinophils % 4.7 (*)     Immature Neutrophil % 0.6 (*)     All other components within normal limits   COMPREHENSIVE METABOLIC PANEL - Abnormal; Notable for the following components:    Chloride 98 (*)     Glucose 105 (*)     All other components within normal limits   BRAIN NATRIURETIC PEPTIDE - Abnormal; Notable for the following components:    Pro-.7 (*)     All other components within normal limits   PROTIME-INR - Abnormal; Notable for the following components:    Protime 10.4 (*)     All other components within normal limits   TROPONIN       When ordered, only abnormal lab results are displayed. All other labs were within normal range or not returned as of this dictation. EKG: When ordered, EKG's are interpreted by the Emergency Department Physician in the absence of a cardiologist.  Please see their note for interpretation of EKG. RADIOLOGY:   Non-plain film images such as CT, Ultrasound and MRI are read by the radiologist. Plain radiographic images are visualized andpreliminarily interpreted by the  ED Provider with the below findings:        Interpretation perthe Radiologist below, if available at the time of this note:    XR CHEST PORTABLE   Final Result   No acute cardiopulmonary process identified. XR HAND LEFT (MIN 3 VIEWS)   Final Result   No fracture or periarticular erosion.       Degenerative osteo arthritic changes involve all D IP joints, particularly   the joint space between the trapezium and left 1st metacarpal.         XR WRIST LEFT (MIN 3 VIEWS)   Final Result   Stable juxta cortical calcifications adjacent to ulnar styloid process and   ulnar carpal joint measuring 5.5 mm in greatest dimension consistent with   avulsion fractures of uncertain acuity or accessory ossicles. Otherwise, no   radiographic evidence of fracture or dislocation. Polyarticular moderate-advanced degenerative arthritic change worst in the   1st and 2nd carpal-metacarpal joints, atherosclerotic calcification   visualized runoff arteries of the right forearm of uncertain hemodynamic   significance, subjective volar soft tissue swelling consistent with   contusion, edema, cellulitis. XR SHOULDER LEFT (MIN 2 VIEWS)   Final Result   1. No acute osseous abnormality of the left shoulder evident. 2. Moderate osteoarthritis of the left acromioclavicular joint. 3. Mild osteoarthritis of the left glenohumeral joint. CT CERVICAL SPINE WO CONTRAST   Final Result   1. Mild spinal canal stenosis and severe bilateral neural foraminal narrowing   at C6-7 secondary to a posterior disc osteophyte complex, uncovertebral   overgrowth and facet arthropathy. 2. Severe multilevel neural foraminal narrowing as described above. Neural   foraminal narrowing is severe on the right at C2-3 and C3-4 and severe on the   left at C4-5 and C5-6.   3. Additional multilevel degenerative changes, as described above. CT HEAD WO CONTRAST   Final Result   No hemorrhage or acute infarction. Small confluent areas of small vessel infarction, ischemia or gliosis from   prior ischemic events in the bilateral periventricular white matter,   bilateral forceps minor and the anterior limb of the right internal capsule.       RECOMMENDATIONS:   Unavailable           XR WRIST LEFT (MIN 3 VIEWS)    Result Date: 1/3/2022  EXAMINATION: XRAY VIEWS OF THE LEFT WRIST 1/3/2022 9:27 am COMPARISON: 12/05/2021 HISTORY: ORDERING SYSTEM PROVIDED HISTORY: numbness, persistent pain since fall on 12/6 TECHNOLOGIST PROVIDED HISTORY: Reason for exam:->numbness, persistent pain since fall on 12/6 Reason for Exam: woke up with pain-NKI FINDINGS: Stable smoothly marginated juxta cortical calcification medial compartment of the left wrist adjacent to the ulnar styloid process and ulnocarpal joint consistent with old avulsion injuries or accessory ossicles. No radiographic evidence of acute fracture or dislocation. Moderate-advanced polyarticular degenerative arthritic change noted worst in the radiocarpal and 1st carpal-metacarpal joints. Scapholunate distance is within limits of normal. Subjective soft tissue prominence volar aspect of the left wrist consistent with contusion, edema, cellulitis. Atherosclerotic calcification noted in the runoff arteries of left arm of uncertain hemodynamic significance. Stable juxta cortical calcifications adjacent to ulnar styloid process and ulnar carpal joint measuring 5.5 mm in greatest dimension consistent with avulsion fractures of uncertain acuity or accessory ossicles. Otherwise, no radiographic evidence of fracture or dislocation. Polyarticular moderate-advanced degenerative arthritic change worst in the 1st and 2nd carpal-metacarpal joints, atherosclerotic calcification visualized runoff arteries of the right forearm of uncertain hemodynamic significance, subjective volar soft tissue swelling consistent with contusion, edema, cellulitis. XR HAND LEFT (MIN 3 VIEWS)    Result Date: 1/3/2022  EXAMINATION: THREE XRAY VIEWS OF THE LEFT HAND 1/3/2022 9:27 am COMPARISON: None. HISTORY: ORDERING SYSTEM PROVIDED HISTORY: numbness, persistent pain TECHNOLOGIST PROVIDED HISTORY: Reason for exam:->numbness, persistent pain Reason for Exam: woke up with pain  NKI FINDINGS: Degenerative osteo arthritic changes involve all D IP joints, particularly the joint space between the trapezium and left 1st metacarpal.  No fracture or periarticular erosion was noted. A normal variant ununited accessory ossification center was noted for the left ulnar styloid.      No fracture or periarticular RECOMMENDATIONS: Unavailable     CT CERVICAL SPINE WO CONTRAST    Result Date: 1/3/2022  EXAMINATION: CT OF THE CERVICAL SPINE WITHOUT CONTRAST, 1/3/2022 9:38 am TECHNIQUE: CT of the cervical spine was performed without the administration of intravenous contrast. Multiplanar reformatted images are provided for review. Dose modulation, iterative reconstruction, and/or weight based adjustment of the mA/kV was utilized to reduce the radiation dose to as low as reasonably achievable. COMPARISON: None HISTORY: ORDERING SYSTEM PROVIDED HISTORY:  Left hand numbness, intermittent TECHNOLOGIST PROVIDED HISTORY: Reason for Exam:  Left hand numbness, intermittent Decision Support Exception - unselect if not a suspected or confirmed emergency medical condition->Emergency Medical Condition (MA) Reason for Exam:  Left hand numbness intermittent FINDINGS: BONES/ALIGNMENT: There is grade 1 anterolisthesis of C3 relative to C4 and C4 relative to C5. Alignment is otherwise normal.  There is no acute fracture. There is multilevel disc space narrowing and osteophyte formation most pronounced at C6-7. No lytic or blastic osseous lesions are identified. SOFT TISSUES: No paraspinal mass is identified. C2-C3:  There is severe right neural foraminal narrowing secondary to uncovertebral overgrowth and facet arthropathy. There is no spinal canal stenosis. Mild left neural foraminal narrowing is present. C3-4:  There is uncovertebral overgrowth and facet arthropathy resulting in severe right and mild left neural foraminal narrowing. No significant spinal canal stenosis is present. C4-5:  There is facet arthropathy and left-sided uncovertebral overgrowth resulting in severe left neural foraminal narrowing. Facet arthropathy mildly narrows the right neural foramen. There is no significant spinal canal stenosis. C5-6: There is a disc bulge and uncovertebral overgrowth. There is bilateral facet arthropathy.   There is severe left and mild right neural foraminal narrowing. No significant spinal canal stenosis is present. C6-7: There is a posterior disc osteophyte complex, uncovertebral overgrowth and facet arthropathy. There is mild spinal canal stenosis and severe bilateral neural foraminal narrowing. C7-T1: There is no disc bulge or protrusion present. There is no significant spinal canal stenosis or neural foraminal narrowing present. 1. Mild spinal canal stenosis and severe bilateral neural foraminal narrowing at C6-7 secondary to a posterior disc osteophyte complex, uncovertebral overgrowth and facet arthropathy. 2. Severe multilevel neural foraminal narrowing as described above. Neural foraminal narrowing is severe on the right at C2-3 and C3-4 and severe on the left at C4-5 and C5-6. 3. Additional multilevel degenerative changes, as described above. XR SHOULDER LEFT (MIN 2 VIEWS)    Result Date: 1/3/2022  EXAMINATION: THREE XRAY VIEWS OF THE LEFT SHOULDER 1/3/2022 9:27 am COMPARISON: Left humerus series 12/05/2021 HISTORY: ORDERING SYSTEM PROVIDED HISTORY: trauma TECHNOLOGIST PROVIDED HISTORY: Reason for exam:->trauma Reason for Exam: pain due to fall FINDINGS: There is normal alignment of the left shoulder. There is no acute fracture or dislocation identified. Amorphous calcification noted along the greater tuberosity suggesting calcific tendinosis. Moderate acromioclavicular osteoarthritis is noted. Mild degenerative changes of the left glenohumeral joint are noted. 1. No acute osseous abnormality of the left shoulder evident. 2. Moderate osteoarthritis of the left acromioclavicular joint. 3. Mild osteoarthritis of the left glenohumeral joint.      XR CHEST PORTABLE    Result Date: 1/3/2022  EXAMINATION: ONE XRAY VIEW OF THE CHEST 1/3/2022 9:27 am COMPARISON: 03/20/2018 HISTORY: ORDERING SYSTEM PROVIDED HISTORY: Chest pain TECHNOLOGIST PROVIDED HISTORY: Reason for exam:->Chest pain Reason for Exam: chest pain FINDINGS: The mediastinal and cardiac contours are normal.  The lungs are clear. There is no focal consolidation, pleural effusion or pneumothorax evident. The bones are unremarkable. No acute cardiopulmonary process identified. PROCEDURES   Unless otherwise noted below, none     Procedures    CRITICAL CARE TIME   N/A    CONSULTS:  None      EMERGENCY DEPARTMENT COURSE and DIFFERENTIAL DIAGNOSIS/MDM:   Vitals:    Vitals:    01/03/22 1600 01/03/22 1630 01/03/22 1650 01/03/22 1700   BP: (!) 151/82 (!) 166/90  (!) 155/83   Pulse:  78     Resp:       Temp:       SpO2: 97% 97% 97%    Weight:       Height:           Patient was given thefollowing medications:  Medications - No data to display        Patient is a 49-year-old male diabetic presents with above HPI. Patient initially seen in the waiting room by triage provider, who had placed initial work-up. In my discussion with  patient, he is describing paresthesias to his left third and fourth finger, but denies any loss of sensation or weakness. On examination, he is alert oriented in no acute distress. No focal neuro deficits, no facial asymmetry, dysarthria. He is demonstrating good range of motion of his fingers, there is no noted erythema edema, finger swelling. He does have some limited left wrist deviation, and mild tenderness that he relates to the fall. Good cap refill, normal sensation. Have reviewed EMR. Patient had MRI showing acute to subacute chip avulsion fracture ventral radius correlating to CT scan, evidence of tendinosis, mild tenosynovitis, stable degenerative changes, trace joint effusion of the distal radial ulnar joint. At this time, he has some limited left wrist deviation, but sensation is intact. He is just describing some nondermatomal paresthesias. At this time, no concern for CVA, cellulitis, abscess, infectious tenosynovitis, ischemia, DVT.   Potentials would be tenosynovitis, neuropathy, cervical radiculopathy, osteoarthritis, nerve compression. Patient discussed with attending Dr. Willie Rivera also had face time and patient agreed with work-up and disposition. Recommendations for patient to continue splint, follow-up with sports medicine, his PCP. FINAL IMPRESSION      1. Left hand paresthesia    2. Left wrist pain    3.  Tenosynovitis of left wrist          DISPOSITION/PLAN   DISPOSITION Decision To Discharge 01/03/2022 06:30:14 PM      PATIENT REFERREDTO:  Marcelino Buckley PA-C  [place] [address], [address]  [place] [address]  [phone]          DISCHARGE MEDICATIONS:  New Prescriptions    No medications on file       DISCONTINUED MEDICATIONS:  Discontinued Medications    No medications on file              (Please note that portions ofthis note were completed with a voice recognition program.  Efforts were made to edit the dictations but occasionally words are mis-transcribed.)    Tyson Tariq PA-C (electronically signed)             Tyson Tariq PA-C  01/03/22 2570

## 2022-01-03 NOTE — ED PROVIDER NOTES
As provider-in-triage, I performed a medical screening history and physical exam on this patient. HISTORY OF PRESENT ILLNESS  Crystal Rosario is a 74 y.o. male who presents for several days of intermittent numbness in left hand from wrist distally, states had fall on 12/6 and is scheduled for MRI of left hand and wrist in a few days. Also notes pain in left shoulder/chest today, thinks may be related to fall. No weakness, no other numbness. PHYSICAL EXAM  BP (!) 154/82   Pulse 77   Temp 98.7 °F (37.1 °C)   Resp 17   Ht 5' 6\" (1.676 m)   Wt 215 lb (97.5 kg)   SpO2 96%   BMI 34.70 kg/m²     On exam, the patient appears well-hydrated, well-nourished, and in no acute distress. Mucous membranes are moist. Speech is clear. Breathing is unlabored. Skin is dry. Mental status is normal. Moves all extremities, and is without facial droop. Comment: Please note this report has been produced using speech recognition software and may contain errors related to that system including errors in grammar, punctuation, and spelling, as well as words and phrases that may be inappropriate. If there are any questions or concerns please feel free to contact the dictating provider for clarification.        [id number]  01/03/22 5134

## 2022-01-03 NOTE — ED PROVIDER NOTES
I independently examined and evaluated Jade Chatterjee. In brief, presents with Left hand paresthesias. Wrist pain x 1 month since fall. Paresthesia in left hand new this am.   In 3rd and 4th finger tips currently. This AM, it was his entire hand. Vitals:    01/03/22 1630   BP: (!) 166/90   Pulse: 78   Resp:    Temp:    SpO2: 97%     Focused exam revealed patient sitting comfortably in the bed. Heart regular rate and rhythm. Lungs clear to auscultation bilaterally. 2+ radial pulses bilaterally. Normal  strength in his left hand. Mild swelling of the left wrist when compared to the right. Mild tenderness. Reports tingling when his third and fourth fingers are touched on the distal end. No tingling in any other fingers. Normal capillary refill. ED course:  Work-up is unremarkable. Extensive radiologic examination shows no's acute abnormalities. Given the very small area of patient symptoms and recent fracture I suspect his symptoms are due to to a peripheral neuropathy possibly due to some of the tendinitis noted on MRI done yesterday. Recommended he sleep with the Velcro wrist splint he already has on and follow-up closely with his orthopedist.    I did don appropriate PPE (including face mask, protective eye ware/safety glasses, gloves), as recommended by the health facility/national standard best practice, during my bedside interactions with the patient. All diagnostic, treatment, and disposition decisions were made by myself in conjunction with the advanced practice provider. For all further details of the patient's emergency department visit, please see the advanced practice provider's documentation. Comment: Please note this report has been produced using speech recognition software and may contain errors related to that system including errors in grammar, punctuation, and spelling, as well as words and phrases that may be inappropriate.  If there are any questions or concerns please feel free to contact the dictating provider for clarification.         Miguelangel Rodriguez MD  01/05/22 9215

## 2022-01-10 ENCOUNTER — OFFICE VISIT (OUTPATIENT)
Dept: ORTHOPEDIC SURGERY | Age: 67
End: 2022-01-10
Payer: MEDICARE

## 2022-01-10 VITALS
BODY MASS INDEX: 34.55 KG/M2 | HEIGHT: 66 IN | WEIGHT: 215 LBS | OXYGEN SATURATION: 97 % | HEART RATE: 83 BPM | RESPIRATION RATE: 16 BRPM

## 2022-01-10 DIAGNOSIS — R20.2 PARESTHESIA OF LEFT UPPER EXTREMITY: Primary | ICD-10-CM

## 2022-01-10 PROCEDURE — 99213 OFFICE O/P EST LOW 20 MIN: CPT | Performed by: PHYSICIAN ASSISTANT

## 2022-01-10 ASSESSMENT — ENCOUNTER SYMPTOMS
RESPIRATORY NEGATIVE: 1
EYES NEGATIVE: 1
GASTROINTESTINAL NEGATIVE: 1

## 2022-01-10 NOTE — PROGRESS NOTES
Review of Systems   Constitutional: Negative. HENT: Negative. Eyes: Negative. Respiratory: Negative. Cardiovascular: Negative. Gastrointestinal: Negative. Genitourinary: Negative. Musculoskeletal: Positive for arthralgias. Negative for neck pain and neck stiffness. Skin: Negative. Negative for rash and wound. Neurological: Positive for numbness. Psychiatric/Behavioral: Negative. HPI:Elie Vazquez is a 74 y.o. male that presents back to the office today to go over the MRI of his left wrist.  Recently he started developing another symptom and that was numbness and tingling in his index middle and ring finger. He actually did go to the emergency department on January 3, 2022 and multiple imaging studies were done including a CT of the cervical spine x-ray of his wrist and hand. CT of the cervical spine showed multiple levels of neuroforaminal narrowing severe in nature specifically towards the left side. No acute fractures were noted in the wrist.  He states that the feeling of being in an arm bar that he was feeling last time has dissipated but he continues to have some pain in the hand and numbness in the fingers as well as loss of . To use the wrist brace. Past Medical History:   Diagnosis Date    Angina at rest [facility])     Arthritis     \"Hands, Knees, Shoulders\"    CAD (coronary artery disease)     Sees Dr. Clark Grade Carotid  Doppler 03/13/2017    Duplex sonography with color flow enhancement was performed bilaterally on the cervical carotid system. No evidence of hemodynamically significant stenosis in the bilateral internal carotid arteries. There is evidence of hemodynamically significant stenosis of the bilateral external carotid arteries, L > R.    Cellulitis 2/2/2013    5th finger-Left hand-ER visit -report in Epic    Chronic back pain     Diabetes mellitus ([facility] [address] Dx 2007    Fractured rib Dx 9-13    Full dentures     Gonorrhea Dx 6262'Q \"Had Treatment\"    H/O cardiac catheterization 2/5/2013 2/5/2013-Severe 3 vessel CAD-recomm CABG- Dr Misha Velázquez - report in epic    H/O cardiovascular stress test 2/4/2013 2/4/2013-mod ischemia inferior wall. EF 53%. LVSF normal-Dr Olman Rosenbaum - report in epic    H/O cardiovascular stress test 1/16/2015    treadmill    H/O Doppler ultrasound 2/6/2013    CAROTID DOPPLER-2/6/2013-There is no hemodynamic significant stenosis present.estimated 16-49 % stenosis of both internal carotid arteries. - report in epic    H/O echocardiogram 03/08/2013    [id number]%, LV systolic function is borderline reduced, , abnormal LV diastolic function Stage 1, septal motion is consistent with post-operative state. , no pericardial effusion.  Heart attack ([address] 1993    History of cardiovascular stress test 03/13/2017    Normal perfusion study with normal distribution in all coronal, short, and horizontal axis. Normal LV function & wall motion. LVEF is 55 %    History of echocardiogram 03/13/2017    Normal LV systolic functionEjection fraction is visually estimated at 55%. Mild concentric left ventricular hypertrophy. Grade I diastolic dysfunction. No regional wall motion abnormalites. The left atrium is mildly dilated. No significant valvular disease noted. Mild tricuspid regurgitation. No evidence of pericardial effusion.  History of exercise stress test 03/05/2020    Treadmill,  Physiological BP response to  exercise   ETT negative for Ischemia / Arrhythmia.     History of PTCA 11/2010 1 stent 12/2010 2 stents    stents 3 mid lad prox lad & rca    History of shingles Last Episode In 2000's    \"Face, Torso\"    Hx of Carotid Doppler 03/04/2019    Mild (0-49%) disease of the bilateral internal carotid, significant stenosis of the bilateral external carotid arteries L>R, Normal right vertebral flow, left vertebral flow non visualized    Hyperlipidemia     Hypertension     Kidney stone 1990's    Kidney Stone Surgery    S/P CABG x 4 2/8/2013 2/8/2013-LIMA to LAD; VG to OM;VG to Diagonal; VG to RCA -Dr Marianne Kim- [facility]    Shinjasmyne Patient states that he started Valtrex for this 6/30/14.     Shortness of breath on exertion     Wears glasses        Past Surgical History:   Procedure Laterality Date    CARDIAC CATHETERIZATION  2/5/2013 2/5/2013-Severe 3 vessel disease- Recomm CABG- report in epic   [address]  2-8-13    CABG (4 Bypasses)    COLONOSCOPY  Last Done 10-8-15    CORONARY ANGIOPLASTY  11-10    One Heart Stent    CORONARY ANGIOPLASTY  1-11-11    Two Heart Stents    CORONARY ARTERY BYPASS GRAFT  2/8/2013 2/8/2013- CABG X 4-LIMA to LAD; VG to OM;VG to Diagonal; VG to RCA -Dr Charmaine Ro      All Teeth Extracted In Past    ENDOSCOPY, COLON, DIAGNOSTIC  03/28/2017    normal esophagus, gastritis found, moderate amount of food particles in the stomach    INGUINAL HERNIA REPAIR Left [address]  1990's    KNEE ARTHROSCOPY Left 5/26/16    Partial medial and lateral meniscectomy & chondroplasty       Family History   Problem Relation Age of Onset    Heart Disease Mother         Heart Attack    Early Death Mother         Heart Attack    Early Death Brother         64 Or 62    Substance Abuse Brother         Alcoholic    Early Death Father 48        \"He Drank Himself To Death\"    Substance Abuse Father         Alcohol    Cirrhosis Father     Early Death Sister 2        Leukemia    Cancer Sister         Leukemia    Early Death Sister 61        Stroke    Stroke Sister     Arthritis Sister     Heart Disease Sister     Vision Loss Sister         \"RP\"       Social History     Socioeconomic History    Marital status:      Spouse name: None    Number of children: None    Years of education: None    Highest education level: None   Occupational History    None   Tobacco Use    Smoking status: Never Smoker    Smokeless tobacco: Never Used   Substance and Sexual Activity    Alcohol use: No     Alcohol/week: 0.0 standard drinks    Drug use: Yes     Types: Marijuana Douglas Don)     Comment: \"Use Marijuana About Every Other Day\"last 3-27-17    Sexual activity: Never   Other Topics Concern    None   Social History Narrative    None     Social Determinants of Health     Financial Resource Strain:     Difficulty of Paying Living Expenses: Not on file   Food Insecurity:     Worried About Running Out of Food in the Last Year: Not on file    Gui of Food in the Last Year: Not on file   Transportation Needs:     Lack of Transportation (Medical): Not on file    Lack of Transportation (Non-Medical):  Not on file   Physical Activity:     Days of Exercise per Week: Not on file    Minutes of Exercise per Session: Not on file   Stress:     Feeling of Stress : Not on file   Social Connections:     Frequency of Communication with Friends and Family: Not on file    Frequency of Social Gatherings with Friends and Family: Not on file    Attends Denominational Services: Not on file    Active Member of [address] [place] [address] or Organizations: Not on file    Attends Club or Organization Meetings: Not on file    Marital Status: Not on file   Intimate Partner Violence:     Fear of Current or Ex-Partner: Not on file    Emotionally Abused: Not on file    Physically Abused: Not on file    Sexually Abused: Not on file   Housing Stability:     Unable to Pay for Housing in the Last Year: Not on file    Number of [place] in the Last Year: Not on file    Unstable Housing in the Last Year: Not on file       Current Outpatient Medications   Medication Sig Dispense Refill    acetaminophen (AMINOFEN) 325 MG tablet Take 2 tablets by mouth every 6 hours as needed for Pain 120 tablet 0    docusate sodium (COLACE) 100 MG capsule Take 1 capsule by mouth 2 times daily 100 capsule 0    TERAZOSIN HCL PO Take by mouth      isosorbide mononitrate (IMDUR) 30 MG extended release tablet Take 1 tablet by mouth 2 times daily 60 tablet 5    amLODIPine (NORVASC) 5 MG tablet Take 5 mg by mouth daily      atorvastatin (LIPITOR) 10 MG tablet Take 1 tablet by mouth daily 90 tablet 3    lisinopril (PRINIVIL;ZESTRIL) 40 MG tablet Take 1 tablet by mouth daily 90 tablet 3    hydrochlorothiazide (HYDRODIURIL) 25 MG tablet   5    BASAGLAR KWIKPEN 100 UNIT/ML injection pen   6    metFORMIN (GLUCOPHAGE) 1000 MG tablet Take 1,000 mg by mouth 2 times daily (with meals)      aspirin EC 81 MG EC tablet Take 1 tablet by mouth daily 90 tablet 3    carvedilol (COREG) 25 MG tablet Take 1 tablet by mouth 2 times daily 180 tablet 3    Insulin Aspart (NOVOLOG FLEXPEN SC) Inject 20 Units into the skin Per Sliding Scale        No current facility-administered medications for this visit. Allergies   Allergen Reactions    Pcn [Penicillins] Hives, Itching and Rash    Morphine Itching       Review of Systems:  See above      Physical Exam:   Pulse 83   Resp 16   Ht 5' 6\" (1.676 m)   Wt 215 lb (97.5 kg)   SpO2 97%   BMI 34.70 kg/m²        Gait is Normal.   Gen/Psych:Examination reveals a pleasant individual in no acute distress. The patient is oriented to time, place and person. The patient's mood and affect are appropriate. Patient appears well nourished. Body habitus is overweight    Head: Head is atraumatic normocephalic,  ears are symmetric,     Eyes: Eyes show equal pupils bilaterally, extraocular muscles intact    Ears:  Hearing is intact normal voice at 5 feet    Nose: Nares are patent bilaterally, no epistaxis,no rhinorrhea     Lymph:  No obvious lymphedema in bilateral upper extremities     Skin intact in bilateral upper extremities with no ulcerations, lesions, rash, erythema. Vascular: There are no varicosities in bilateral upper  extremities, sensation intact to light touch over bilateral upper extremities.           Left Wrist exam  Inspection: No erythema, mild edema, no ecchymosis  Palpation: No tenderness to palpation    Median nerve distribution sensation: Decreased to light touch, ulnar nerve distribution sensation and motor function: Intact, radial nerve sensation and motor function: Intact  Capillary refill: Less than 3 seconds, radial pulse 2+  Tinel sign at the wrist:       Outsiderecord review: ER note and x-rays reviewed. CT scan of the cervical spine reviewed MRI of the left wrist reviewed    Imaging studies:  MRI of left wrist 12/29/2021  Impression   Findings concerning for acute-to-subacute likely small chip/avulsion fracture   to the ventral radius corresponding with findings on prior CT exam 12/06/2021.       Mild tendinosis of extensor carpi ulnaris tendon and mild-to-moderate   tendinosis of extensor carpi radialis tendon.       Mild tenosynovitis of flexor pollicis longus tendon.       Stable degenerative changes to the wrist most significant at the [address]st [address]   joint.       Trace joint effusion to the distal radioulnar joint.                 Impression:     Diagnosis Orders   1.  Paresthesia of left upper extremity       (Possible cervical radiculopathy)      Plan:    Patient Instructions   EMG Ordered  Please call office once scheduled  Continue to weight bear as tolerated  Continue range of motion

## 2022-01-10 NOTE — PATIENT INSTRUCTIONS
EMG Ordered  Please call office once scheduled  Continue to weight bear as tolerated  Continue range of motion

## 2022-01-11 ENCOUNTER — TELEPHONE (OUTPATIENT)
Dept: ORTHOPEDIC SURGERY | Age: 67
End: 2022-01-11

## 2022-01-11 NOTE — TELEPHONE ENCOUNTER
Pt called stating he is still in a lot of pain in his left hand. He is requesting some pain medication.      Please advise

## 2022-01-26 ENCOUNTER — OFFICE VISIT (OUTPATIENT)
Dept: PHYSICAL MEDICINE AND REHAB | Age: 67
End: 2022-01-26
Payer: MEDICARE

## 2022-01-26 VITALS — TEMPERATURE: 97.1 F

## 2022-01-26 DIAGNOSIS — G56.03 BILATERAL CARPAL TUNNEL SYNDROME: Primary | ICD-10-CM

## 2022-01-26 DIAGNOSIS — R20.2 PARESTHESIA AND PAIN OF BOTH UPPER EXTREMITIES: ICD-10-CM

## 2022-01-26 DIAGNOSIS — M79.601 PARESTHESIA AND PAIN OF BOTH UPPER EXTREMITIES: ICD-10-CM

## 2022-01-26 DIAGNOSIS — G56.23 ULNAR NEUROPATHY OF BOTH UPPER EXTREMITIES: ICD-10-CM

## 2022-01-26 DIAGNOSIS — G60.8 PERIPHERAL SENSORY NEUROPATHY: ICD-10-CM

## 2022-01-26 DIAGNOSIS — M79.602 PARESTHESIA AND PAIN OF BOTH UPPER EXTREMITIES: ICD-10-CM

## 2022-01-26 PROCEDURE — 95911 NRV CNDJ TEST 9-10 STUDIES: CPT | Performed by: PHYSICAL MEDICINE & REHABILITATION

## 2022-01-26 PROCEDURE — 95886 MUSC TEST DONE W/N TEST COMP: CPT | Performed by: PHYSICAL MEDICINE & REHABILITATION

## 2022-01-26 NOTE — PROGRESS NOTES
EMG REPORT     CHIEF COMPLAINT: \"I cannot move my left thumb\". HISTORY OF PRESENT ILLNESS: 74 y.o. right hand dominant male with a syncopal episode on 12/5/2021 that resulted in a hard fall. He had struggled with arthritic pain and grinding at the base of the left thumb before that fall. After his fall he has been unable to make a left fist or oppose his thumb to his little finger. He has numbness and tingling in the long fingers of the left hand and some tenderness to palpation in the left palm. His sleep is negatively impacted any drops things from his left . He has some minor tingling in the right hand and a history of deep lacerations to the right wrist x2 in the past.  He rated his left hand pain severity as 10/10. He denied having neck pain radiating into his upper limbs. He did not describe any cramping, rashes or limb discoloration. He has a history of diabetes. No thyroid disorder. PHYSICAL EXAMINATION: Alert. About 50 degrees of active cervical spine rotation bilaterally. Spurling's maneuver was uncomfortable but did not reproduce limb symptoms. Upper limb reflexes were absent. Tinel's sign was negative. Patient complained of tenderness with active and passive digit flexion on the left. He was unable to oppose his thumb to digits 3 4 or 5. Sensation seemed dysesthetic throughout the left palm and in the long fingers. There was no atrophy or tremor. The left hand was diffusely swollen. NERVE CONDUCTION STUDIES:     MOTOR         LATENCY NORMAL AMPLITUDE DISTANCE COND. ANDREAS.    RIGHT  MEDIAN 4.6 < 4.2 msec 3.7 8 cm >50   LEFT  MEDIAN 4.6 < 4.2 msec 1.4 8 cm 54   RIGHT  ULNAR 2.8 < 4.2 msec 7.7 8 cm >50   LEFT  ULNAR 3.4 < 4.2 msec 3.2 8 cm 66      SENSORY  ORTHODROMIC        LATENCY NORMAL AMPLITUDE DISTANCE   RIGHT MEDIAN 2.8 <2.3 msec 6 10 cm   LEFT  MEDIAN Absent < 2.3 msec  10 cm   RIGHT  ULNAR Absent < 2.3 msec  10 cm   LEFT  ULNAR Absent < 2.3 msec  10 cm       Left

## 2022-01-26 NOTE — LETTER
January 26, 2022        Mikaela Rockwell, [address], [address]  [address] [facility]        Patient Name: Jade Chatterjee   MRN Number: [id number]   YOB: 1955   Date Of Visit: 01/26/2022        Dear Eyad Drummond PA-C,       Thank you for referring Jade Chatterjee to me for electrodiagnostic testing. Attached are the findings of the EMG and my assessment. If you have any further questions, please do not hesitate to call me. Thank you for this interesting referral.      Sincerely,          DERRELL Mayo MD.

## 2022-02-10 ENCOUNTER — TELEPHONE (OUTPATIENT)
Dept: ORTHOPEDIC SURGERY | Age: 67
End: 2022-02-10

## 2022-02-10 NOTE — TELEPHONE ENCOUNTER
LMOVM for patient to contact the office to allow them to know that he needs to follow up with one of the surgeon for carpal tunnel surgery. Patient results show severe carpal tunnel.

## 2022-02-28 ENCOUNTER — OFFICE VISIT (OUTPATIENT)
Dept: CARDIOLOGY CLINIC | Age: 67
End: 2022-02-28
Payer: MEDICARE

## 2022-02-28 VITALS
SYSTOLIC BLOOD PRESSURE: 122 MMHG | WEIGHT: 221 LBS | BODY MASS INDEX: 35.52 KG/M2 | HEART RATE: 72 BPM | DIASTOLIC BLOOD PRESSURE: 68 MMHG | HEIGHT: 66 IN

## 2022-02-28 DIAGNOSIS — Z98.61 HISTORY OF PTCA: ICD-10-CM

## 2022-02-28 DIAGNOSIS — E66.09 CLASS 2 OBESITY DUE TO EXCESS CALORIES WITHOUT SERIOUS COMORBIDITY WITH BODY MASS INDEX (BMI) OF 35.0 TO 35.9 IN ADULT: ICD-10-CM

## 2022-02-28 DIAGNOSIS — I10 PRIMARY HYPERTENSION: ICD-10-CM

## 2022-02-28 DIAGNOSIS — I25.810 CORONARY ARTERY DISEASE INVOLVING CORONARY BYPASS GRAFT OF NATIVE HEART WITHOUT ANGINA PECTORIS: Primary | ICD-10-CM

## 2022-02-28 DIAGNOSIS — E11.59 TYPE 2 DIABETES MELLITUS WITH OTHER CIRCULATORY COMPLICATION, UNSPECIFIED WHETHER LONG TERM INSULIN USE (HCC): ICD-10-CM

## 2022-02-28 DIAGNOSIS — E78.5 HYPERLIPIDEMIA, UNSPECIFIED HYPERLIPIDEMIA TYPE: ICD-10-CM

## 2022-02-28 DIAGNOSIS — Z95.1 S/P CABG X 4: ICD-10-CM

## 2022-02-28 PROCEDURE — 99213 OFFICE O/P EST LOW 20 MIN: CPT | Performed by: INTERNAL MEDICINE

## 2022-02-28 NOTE — PATIENT INSTRUCTIONS
[address] Laboratory Locations - No appointment necessary. Sites open Monday to Friday. Call your preferred location for test preparation, business hours and other information you need. [employer] accepts [employer]. [place] [facility]   [facility] [address] Shanae Greenberg, [address] [facility]  Phone: [phone] [facility] [address]  [address]. Meghann jefferson, [address]  Phone: [phone]     **It is YOUR responsibilty to bring medication bottles and/or updated medication list to [address] [place] [address]. This will allow us to better serve you and all your healthcare needs**  Please be informed that if you contact our office outside of normal business hours the physician on call cannot help with any scheduling or rescheduling issues, procedure instruction questions or any type of medication issue. We advise you for any urgent/emergency that you go to the nearest emergency room! PLEASE CALL OUR OFFICE DURING NORMAL BUSINESS HOURS    Monday - Friday   8 am to 5 pm    [place]: [phone]    [place]: [phone]    [place]:  [phone]  CORONARY ARTERY DISEASE:Yes   clinically stable. Patient is on optimal medical regimen ( see medication list above )  - Patient is currently  asymptomatic from CAD.          - changes in  treatment:   no, on ASA, Imdur & Coreg.           - Testing ordered:  no  [place] classification: 1  Stress test: 3/2020   Fair exercise tolerance. 7 METs work load.   Physiological BP response to exercise.   ETT negative for Ischemia / Arrhythmia.     HTN:well controlled on current medical regimen, see list above.              - changes in  treatment:   no, on Amlodipine, Lisinopril, HCTZ & COREG     CARDIOMYOPATHY: None known   CONGESTIVE HEART FAILURE: NO KNOWN HISTORY.      VHD: No significant VHD noted  ECHO 3/2017   Normal LV systolic function   Ejection fraction is visually estimated at 55%.   Mild concentric left ventricular hypertrophy.   Grade I diastolic dysfunction.   No regional wall motion abnormalites.   The left atrium is mildly dilated.   No significant valvular disease noted.   Mild tricuspid regurgitation.   No evidence of pericardial effusion. DYSLIPIDEMIA: Patient's profile is at / near [facility] is low                                Tolerating current medical regimen well yes, takes Lipitor                                                              See most recent Lab values in Labs section above.   Diabetes mellitis: .yes,                                      Managed by family MD RAIN under good control yes,                                      Hgb A1c avilable yes,   CAROTID ARTERY DISEASE: yes, mild               No symptoms described pertaining to Carotid artery disease               Up to date on non invasive testing .yes,                Patient is on Guidelines recommended therapy. yes,      ARRHYTHMIAS: None known    TESTS ORDERED: Echo     PREVIOUSLY ORDERED TESTS REVIEWED & DISCUSSED WITH THE PATIENT:     I personally reviewed & interpreted, all previously ordered tests as copied above. Latest Labs are pulled in to the note with dates. Labs, specially in Reference to Lipid profile, Cardiac testing in the form of Echo ( dated: ), stress tests ( dated: ) & other relevant cardiac testing reviewed with patient & recommendations made based on assessment of the results. Discussed role of Cardiac risk factors & effects + treatment of co morbidities with patient & advised accordingly. MEDICATIONS: List of medications patient is currently taking is reviewed in detail with the patient. Discussed any side effects or problems taking the medication. Recommend Continue present management & medications as listed. AFFIRMATION: I spent at least 20 minutes of time reviewing patient's history, previous & current medical problems & all Labs + testing.  This includes chart prep even prior to the vosit. Various goals are discussed and multiple questions answered. Relevant concelling performed. Office follow up in six months.

## 2022-02-28 NOTE — PROGRESS NOTES
OFFICE PROGRESS NOTES      Tabatha Hunter is a 74 y.o. male who has    CHIEF COMPLAINT AS FOLLOWS:  CHEST PAIN: Patient denies any C/O chest pains at this time.      SOB: No C/O SOB at this time.                 LEG EDEMA: No leg edema   PALPITATIONS: Denies any C/O Palpitations   DIZZINESS: No C/O Dizziness   SYNCOPE: None   OTHER/ ADDITIONAL COMPLAINTS:                                     HPI: Patient is here for F/U on his CAD, HTN & Dyslipidemia problems. CAD: Patient has known CAD. Had angioplasty / CABG, both in the past.  HTN: Patient has known essential HTN. Has been treated with guideline recommended medical / physical/ diet therapy as stated below. Dyslipidemia: Patient has known mixed dyslipidemia. Has been treated with guideline recommended medical / physical/ diet therapy as stated below.                 Current Outpatient Medications   Medication Sig Dispense Refill    acetaminophen (AMINOFEN) 325 MG tablet Take 2 tablets by mouth every 6 hours as needed for Pain 120 tablet 0    docusate sodium (COLACE) 100 MG capsule Take 1 capsule by mouth 2 times daily 100 capsule 0    TERAZOSIN HCL PO Take by mouth      isosorbide mononitrate (IMDUR) 30 MG extended release tablet Take 1 tablet by mouth 2 times daily 60 tablet 5    amLODIPine (NORVASC) 5 MG tablet Take 5 mg by mouth daily      atorvastatin (LIPITOR) 10 MG tablet Take 1 tablet by mouth daily 90 tablet 3    lisinopril (PRINIVIL;ZESTRIL) 40 MG tablet Take 1 tablet by mouth daily 90 tablet 3    hydrochlorothiazide (HYDRODIURIL) 25 MG tablet   5    BASAGLAR KWIKPEN 100 UNIT/ML injection pen   6    metFORMIN (GLUCOPHAGE) 1000 MG tablet Take 1,000 mg by mouth 2 times daily (with meals)      aspirin EC 81 MG EC tablet Take 1 tablet by mouth daily 90 tablet 3    carvedilol (COREG) 25 MG tablet Take 1 tablet by mouth 2 times daily 180 tablet 3    Insulin Aspart (NOVOLOG FLEXPEN SC) Inject 20 Units into the skin Per Sliding Scale        No current facility-administered medications for this visit. Allergies: Pcn [penicillins] and Morphine  Review of Systems:    Constitutional: Negative for diaphoresis and fatigue  Respiratory: Negative for shortness of breath  Cardiovascular: Negative for chest pain, dyspnea on exertion, claudication, edema, irregular heartbeat, murmur, palpitations or shortness of breath  Musculoskeletal: Negative for muscle pain, muscular weakness, negative for pain in arm and leg or swelling in foot and leg    Objective:  /68   Pulse 72   Ht 5' 6\" (1.676 m)   Wt 221 lb (100.2 kg)   BMI 35.67 kg/m²   Wt Readings from Last 3 Encounters:   02/28/22 221 lb (100.2 kg)   01/10/22 215 lb (97.5 kg)   01/03/22 215 lb (97.5 kg)     Body mass index is 35.67 kg/m². GENERAL - Alert, oriented, pleasant, in no apparent distress. EYES: No jaundice, no conjunctival pallor. Neck - Supple. No jugular venous distention noted. No carotid bruits. Cardiovascular  Normal S1 and S2 without obvious murmur or gallop. Extremities - No cyanosis, clubbing, or significant edema. Pulmonary  No respiratory distress. No wheezes or rales.       MEDICAL DECISION MAKING & DATA REVIEW:    Lab Review   Lab Results   Component Value Date    TROPONINT <0.010 01/03/2022    TROPONINT <0.010 02/11/2017     Lab Results   Component Value Date     03/06/2013    BNP 13 01/04/2013    PROBNP 309.7 01/03/2022    PROBNP 319.3 02/10/2017     Lab Results   Component Value Date    INR 0.81 01/03/2022    INR 1.04 02/08/2013     Lab Results   Component Value Date    LABA1C 7.9 (H) 02/11/2017    LABA1C 7.1 06/01/2015     Lab Results   Component Value Date    WBC 12.0 (H) 01/03/2022    WBC 10.1 06/03/2021    HCT 43.1 01/03/2022    HCT 42.9 06/03/2021    MCV 96.0 01/03/2022    MCV 98.2 06/03/2021     01/03/2022    PLT See Reflexed IPF Result 06/03/2021     Lab Results   Component Value Date    CHOL 103 06/03/2021    CHOL 85 03/03/2020    TRIG 55 06/03/2021 TRIG 54 03/03/2020    HDL 51 06/03/2021    HDL 52 03/03/2020    LDLCALC 92 02/10/2017    LDLCALC 70 12/22/2014    LDLDIRECT 33 03/03/2020    LDLDIRECT 66 12/02/2014    CHOLHDLRATIO 2.0 06/03/2021     Lab Results   Component Value Date    ALT 18 01/03/2022    ALT 20 06/03/2021    AST 17 01/03/2022    AST 20 06/03/2021     BMP:    Lab Results   Component Value Date     01/03/2022     06/03/2021    K 4.2 01/03/2022    K 5.1 06/03/2021    CL 98 01/03/2022     06/03/2021    CO2 26 01/03/2022    CO2 26 06/03/2021    BUN 18 01/03/2022    BUN 22 06/03/2021    CREATININE 1.1 01/03/2022    CREATININE 1.00 06/03/2021     CMP:   Lab Results   Component Value Date     01/03/2022     06/03/2021    K 4.2 01/03/2022    K 5.1 06/03/2021    CL 98 01/03/2022     06/03/2021    CO2 26 01/03/2022    CO2 26 06/03/2021    BUN 18 01/03/2022    BUN 22 06/03/2021    CREATININE 1.1 01/03/2022    CREATININE 1.00 06/03/2021    PROT 7.1 01/03/2022    PROT 7.2 06/03/2021    PROT 7.7 03/06/2013    PROT 6.9 02/02/2013     No results found for: TSH, TSHHS    QUALITY MEASURES REVIEWED:  1.CAD:Patient is taking anti platelet agent:Yes  2. DYSLIPIDEMIA: Patient is on cholesterol lowering medication:Yes  3. Beta-Blocker therapy for CAD, if prior Myocardial Infarction:Yes   4. Counselled regarding smoking cessation. No   Patient does not Smoke. 5.Anticoagulation therapy (for A.Fib) No   Does Not have A.Fib.  6.Discussed weight management strategies. Assessment & Plan:  Primary / Secondary prevention is the goal by aggressive risk modification, healthy and therapeutic life style changes for cardiovascular risk reduction. CORONARY ARTERY DISEASE:Yes   clinically stable. Patient is on optimal medical regimen ( see medication list above )  - Patient is currently  asymptomatic from CAD.             - changes in  treatment:   no, on ASA, Imdur & Coreg.           - Testing ordered: [place] classification: 1  Stress test: 3/2020   Fair exercise tolerance. 7 METs work load.   Physiological BP response to exercise.   ETT negative for Ischemia / Arrhythmia. HTN:well controlled on current medical regimen, see list above.              - changes in  treatment:   no, on Amlodipine, Lisinopril, HCTZ & COREG     CARDIOMYOPATHY: None known   CONGESTIVE HEART FAILURE: NO KNOWN HISTORY.      VHD: No significant VHD noted  ECHO 3/2017   Normal LV systolic function   Ejection fraction is visually estimated at 55%.   Mild concentric left ventricular hypertrophy.   Grade I diastolic dysfunction.   No regional wall motion abnormalites.   The left atrium is mildly dilated.   No significant valvular disease noted.   Mild tricuspid regurgitation.   No evidence of pericardial effusion. DYSLIPIDEMIA: Patient's profile is at / near basestone [facility] is low                                Tolerating current medical regimen well yes, takes Lipitor                                                              See most recent Lab values in Labs section above.   Diabetes mellitis: .yes,                                      Managed by family MD                                     BS under good control yes,                                      Hgb A1c avilable yes,   CAROTID ARTERY DISEASE: yes, mild               No symptoms described pertaining to Carotid artery disease               Up to date on non invasive testing .yes,                Patient is on Guidelines recommended therapy. yes,      ARRHYTHMIAS: None known    TESTS ORDERED: Echo     PREVIOUSLY ORDERED TESTS REVIEWED & DISCUSSED WITH THE PATIENT:     I personally reviewed & interpreted, all previously ordered tests as copied above. Latest Labs are pulled in to the note with dates.    Labs, specially in Reference to Lipid profile, Cardiac testing in the form of Echo ( dated: ), stress tests ( dated: ) & other relevant cardiac testing reviewed with patient & recommendations made based on assessment of the results. Discussed role of Cardiac risk factors & effects + treatment of co morbidities with patient & advised accordingly. MEDICATIONS: List of medications patient is currently taking is reviewed in detail with the patient. Discussed any side effects or problems taking the medication. Recommend Continue present management & medications as listed. AFFIRMATION: I spent at least 20 minutes of time reviewing patient's history, previous & current medical problems & all Labs + testing. This includes chart prep even prior to the vosit. Various goals are discussed and multiple questions answered. Relevant concelling performed. Office follow up in six months.

## 2022-03-16 ENCOUNTER — PROCEDURE VISIT (OUTPATIENT)
Dept: CARDIOLOGY CLINIC | Age: 67
End: 2022-03-16
Payer: MEDICARE

## 2022-03-16 DIAGNOSIS — E11.59 TYPE 2 DIABETES MELLITUS WITH OTHER CIRCULATORY COMPLICATION, UNSPECIFIED WHETHER LONG TERM INSULIN USE (HCC): ICD-10-CM

## 2022-03-16 DIAGNOSIS — E78.5 HYPERLIPIDEMIA, UNSPECIFIED HYPERLIPIDEMIA TYPE: ICD-10-CM

## 2022-03-16 DIAGNOSIS — I25.810 CORONARY ARTERY DISEASE INVOLVING CORONARY BYPASS GRAFT OF NATIVE HEART WITHOUT ANGINA PECTORIS: ICD-10-CM

## 2022-03-16 DIAGNOSIS — Z98.61 HISTORY OF PTCA: ICD-10-CM

## 2022-03-16 DIAGNOSIS — I10 PRIMARY HYPERTENSION: ICD-10-CM

## 2022-03-16 DIAGNOSIS — Z95.1 S/P CABG X 4: ICD-10-CM

## 2022-03-16 LAB
LV EF: 58 %
LVEF MODALITY: NORMAL

## 2022-03-16 PROCEDURE — 93306 TTE W/DOPPLER COMPLETE: CPT | Performed by: INTERNAL MEDICINE

## 2022-03-18 ENCOUNTER — TELEPHONE (OUTPATIENT)
Dept: CARDIOLOGY CLINIC | Age: 67
End: 2022-03-18

## 2022-03-18 NOTE — TELEPHONE ENCOUNTER
Echo:   Left ventricular function and size is normal, EF is estimated at 55-60%. Mild left ventricular hypertrophy. Grade I diastolic dysfunction. No regional wall motion abnormalities were detected. Mildly dilated left atrium. Mild mitral , tricuspid and pulmonic regurgitation is present. RVSP is 23 mmHg.    No evidence of pericardial effusion    Patient verbally understood

## 2022-07-28 ENCOUNTER — HOSPITAL ENCOUNTER (OUTPATIENT)
Age: 67
Discharge: HOME | End: 2022-07-28
Payer: MEDICARE

## 2022-07-28 LAB
ALBUMIN SERPL-MCNC: 4.5 GM/DL (ref 3.4–5)
ALP BLD-CCNC: 89 IU/L (ref 40–128)
ALT SERPL-CCNC: 17 U/L (ref 10–40)
ANION GAP SERPL CALCULATED.3IONS-SCNC: 13 MMOL/L (ref 4–16)
AST SERPL-CCNC: 18 IU/L (ref 15–37)
BASOPHILS ABSOLUTE: 0 K/CU MM
BASOPHILS RELATIVE PERCENT: 0.3 % (ref 0–1)
BILIRUB SERPL-MCNC: 0.4 MG/DL (ref 0–1)
BUN BLDV-MCNC: 15 MG/DL (ref 6–23)
CALCIUM SERPL-MCNC: 9.3 MG/DL (ref 8.3–10.6)
CHLORIDE BLD-SCNC: 101 MMOL/L (ref 99–110)
CHOLESTEROL: 105 MG/DL
CO2: 26 MMOL/L (ref 21–32)
CREAT SERPL-MCNC: 1 MG/DL (ref 0.9–1.3)
DIFFERENTIAL TYPE: ABNORMAL
EOSINOPHILS ABSOLUTE: 0.2 K/CU MM
EOSINOPHILS RELATIVE PERCENT: 2.6 % (ref 0–3)
GFR AFRICAN AMERICAN: >60 ML/MIN/1.73M2
GFR NON-AFRICAN AMERICAN: >60 ML/MIN/1.73M2
GLUCOSE BLD-MCNC: 202 MG/DL (ref 70–99)
HCT VFR BLD CALC: 42.9 % (ref 42–52)
HDLC SERPL-MCNC: 47 MG/DL
HEMOGLOBIN: 13.9 GM/DL (ref 13.5–18)
IMMATURE NEUTROPHIL %: 0.6 % (ref 0–0.43)
LDL CHOLESTEROL CALCULATED: 44 MG/DL
LYMPHOCYTES ABSOLUTE: 2 K/CU MM
LYMPHOCYTES RELATIVE PERCENT: 21.4 % (ref 24–44)
MCH RBC QN AUTO: 31.2 PG (ref 27–31)
MCHC RBC AUTO-ENTMCNC: 32.4 % (ref 32–36)
MCV RBC AUTO: 96.2 FL (ref 78–100)
MONOCYTES ABSOLUTE: 1.1 K/CU MM
MONOCYTES RELATIVE PERCENT: 11.8 % (ref 0–4)
NUCLEATED RBC %: 0 %
PDW BLD-RTO: 11.5 % (ref 11.7–14.9)
PLATELET # BLD: 184 K/CU MM (ref 140–440)
PMV BLD AUTO: 12.9 FL (ref 7.5–11.1)
POTASSIUM SERPL-SCNC: 4.2 MMOL/L (ref 3.5–5.1)
PROSTATE SPECIFIC ANTIGEN: 1.08 NG/ML (ref 0–4)
RBC # BLD: 4.46 M/CU MM (ref 4.6–6.2)
SEGMENTED NEUTROPHILS ABSOLUTE COUNT: 5.9 K/CU MM
SEGMENTED NEUTROPHILS RELATIVE PERCENT: 63.3 % (ref 36–66)
SODIUM BLD-SCNC: 140 MMOL/L (ref 135–145)
TOTAL IMMATURE NEUTOROPHIL: 0.06 K/CU MM
TOTAL NUCLEATED RBC: 0 K/CU MM
TOTAL PROTEIN: 6.9 GM/DL (ref 6.4–8.2)
TRIGL SERPL-MCNC: 70 MG/DL
VITAMIN D 25-HYDROXY: 32.22 NG/ML
WBC # BLD: 9.3 K/CU MM (ref 4–10.5)

## 2022-07-28 PROCEDURE — 85025 COMPLETE CBC W/AUTO DIFF WBC: CPT

## 2022-07-28 PROCEDURE — 80053 COMPREHEN METABOLIC PANEL: CPT

## 2022-07-28 PROCEDURE — G0103 PSA SCREENING: HCPCS

## 2022-07-28 PROCEDURE — 80061 LIPID PANEL: CPT

## 2022-07-28 PROCEDURE — 36415 COLL VENOUS BLD VENIPUNCTURE: CPT

## 2022-07-28 PROCEDURE — 82306 VITAMIN D 25 HYDROXY: CPT

## 2022-09-12 ENCOUNTER — OFFICE VISIT (OUTPATIENT)
Dept: CARDIOLOGY CLINIC | Age: 67
End: 2022-09-12
Payer: MEDICARE

## 2022-09-12 VITALS
HEIGHT: 66 IN | DIASTOLIC BLOOD PRESSURE: 60 MMHG | WEIGHT: 218 LBS | SYSTOLIC BLOOD PRESSURE: 110 MMHG | HEART RATE: 76 BPM | BODY MASS INDEX: 35.03 KG/M2

## 2022-09-12 DIAGNOSIS — I73.9 PVD (PERIPHERAL VASCULAR DISEASE) (HCC): ICD-10-CM

## 2022-09-12 DIAGNOSIS — E78.5 HYPERLIPIDEMIA, UNSPECIFIED HYPERLIPIDEMIA TYPE: ICD-10-CM

## 2022-09-12 DIAGNOSIS — Z95.1 S/P CABG X 4: ICD-10-CM

## 2022-09-12 DIAGNOSIS — I10 PRIMARY HYPERTENSION: ICD-10-CM

## 2022-09-12 DIAGNOSIS — E11.59 TYPE 2 DIABETES MELLITUS WITH OTHER CIRCULATORY COMPLICATION, UNSPECIFIED WHETHER LONG TERM INSULIN USE (HCC): ICD-10-CM

## 2022-09-12 DIAGNOSIS — E66.09 CLASS 2 OBESITY DUE TO EXCESS CALORIES WITHOUT SERIOUS COMORBIDITY WITH BODY MASS INDEX (BMI) OF 35.0 TO 35.9 IN ADULT: ICD-10-CM

## 2022-09-12 DIAGNOSIS — I25.810 CORONARY ARTERY DISEASE INVOLVING CORONARY BYPASS GRAFT OF NATIVE HEART WITHOUT ANGINA PECTORIS: Primary | ICD-10-CM

## 2022-09-12 DIAGNOSIS — Z98.61 HISTORY OF PTCA: ICD-10-CM

## 2022-09-12 PROCEDURE — 99213 OFFICE O/P EST LOW 20 MIN: CPT | Performed by: INTERNAL MEDICINE

## 2022-09-12 PROCEDURE — 1123F ACP DISCUSS/DSCN MKR DOCD: CPT | Performed by: INTERNAL MEDICINE

## 2022-09-12 NOTE — PATIENT INSTRUCTIONS
CORONARY ARTERY DISEASE:Yes   clinically stable. Patient is on optimal medical regimen ( see medication list above )  - Patient is currently  asymptomatic from CAD. - changes in  treatment:   no, on ASA, Imdur & Coreg.           - Testing ordered:  no  [facility] classification: 1  Stress test: 3/2020   Fair exercise tolerance. 7 METs work load. Physiological BP response to exercise. ETT negative for Ischemia / Arrhythmia. HTN:well controlled on current medical regimen, see list above. - changes in  treatment:   no, on Amlodipine, Lisinopril, HCTZ & COREG      CARDIOMYOPATHY: None known   CONGESTIVE HEART FAILURE: NO KNOWN HISTORY.      VHD: No significant VHD noted  ECHO 3/2022     Left ventricular function and size is normal, EF is estimated at 55-60%. Mild left ventricular hypertrophy. Grade I diastolic dysfunction. No regional wall motion abnormalities were detected. Mildly dilated left atrium. Mild mitral , tricuspid and pulmonic regurgitation is present. RVSP is 23 mmHg. No evidence of pericardial effusion. DYSLIPIDEMIA: Patient's profile is at / near Goal.yes,                                 HDL is low                                Tolerating current medical regimen well yes, takes Lipitor                                                              See most recent Lab values in Labs section above. Diabetes mellitis: .yes,                                      Managed by family MD                                     BS under good control yes,                                      Hgb A1c avilable yes,   CAROTID ARTERY DISEASE: yes, mild               No symptoms described pertaining to Carotid artery disease               Up to date on non invasive testing .yes,                Patient is on Guidelines recommended therapy. yes,       ARRHYTHMIAS: None known    TESTS ORDERED: None this visit     PREVIOUSLY ORDERED TESTS REVIEWED & DISCUSSED WITH THE PATIENT:     I personally reviewed & interpreted, all previously ordered tests as copied above. Latest Labs are pulled in to the note with dates. Labs, specially in Reference to Lipid profile, Cardiac testing in the form of Echo ( dated: ), stress tests ( dated: ) & other relevant cardiac testing reviewed with patient & recommendations made based on assessment of the results. Discussed role of Cardiac risk factors & effects + treatment of co morbidities with patient & advised accordingly. MEDICATIONS: List of medications patient is currently taking is reviewed in detail with the patient & family member present. Discussed any side effects or problems taking the medication. Recommend Continue present management & medications as listed. AFFIRMATION: I spent at least 20 minutes of time reviewing patient's history, previous & current medical problems & all Labs + testing. This includes chart prep even prior to the vosit. Various goals are discussed and multiple questions answered. Relevant concelling performed. Office follow up in six months.

## 2022-09-12 NOTE — PROGRESS NOTES
OFFICE PROGRESS NOTES      Tanmay Roldan is a 74 y.o. male who has    CHIEF COMPLAINT AS FOLLOWS:  CHEST PAIN: Patient denies any C/O chest pains at this time. SOB: No C/O SOB at this time. LEG EDEMA: No leg edema   PALPITATIONS: Denies any C/O Palpitations   DIZZINESS: No C/O Dizziness                        . SYNCOPE: None   OTHER/ ADDITIONAL COMPLAINTS:                                     HPI: Patient is here for F/U on his CAD, HTN & Dyslipidemia problems. CAD: Patient has known CAD. Had angioplasty / CABG, both in the past.  HTN: Patient has known essential HTN. Has been treated with guideline recommended medical / physical/ diet therapy as stated below. Dyslipidemia: Patient has known mixed dyslipidemia. Has been treated with guideline recommended medical / physical/ diet therapy as stated below.                 Current Outpatient Medications   Medication Sig Dispense Refill    acetaminophen (AMINOFEN) 325 MG tablet Take 2 tablets by mouth every 6 hours as needed for Pain 120 tablet 0    docusate sodium (COLACE) 100 MG capsule Take 1 capsule by mouth 2 times daily 100 capsule 0    TERAZOSIN HCL PO Take by mouth      isosorbide mononitrate (IMDUR) 30 MG extended release tablet Take 1 tablet by mouth 2 times daily 60 tablet 5    amLODIPine (NORVASC) 5 MG tablet Take 5 mg by mouth daily      atorvastatin (LIPITOR) 10 MG tablet Take 1 tablet by mouth daily 90 tablet 3    lisinopril (PRINIVIL;ZESTRIL) 40 MG tablet Take 1 tablet by mouth daily 90 tablet 3    hydrochlorothiazide (HYDRODIURIL) 25 MG tablet   5    BASAGLAR KWIKPEN 100 UNIT/ML injection pen   6    metFORMIN (GLUCOPHAGE) 1000 MG tablet Take 1,000 mg by mouth 2 times daily (with meals)      aspirin EC 81 MG EC tablet Take 1 tablet by mouth daily 90 tablet 3    carvedilol (COREG) 25 MG tablet Take 1 tablet by mouth 2 times daily 180 tablet 3    Insulin Aspart (NOVOLOG FLEXPEN SC) Inject 20 Units into the skin Per Sliding Scale        No current facility-administered medications for this visit. Allergies: Pcn [penicillins] and Morphine  Review of Systems:    Constitutional: Negative for diaphoresis and fatigue  Respiratory: Negative for shortness of breath  Cardiovascular: Negative for chest pain, dyspnea on exertion, claudication, edema, irregular heartbeat, murmur, palpitations or shortness of breath  Musculoskeletal: Negative for muscle pain, muscular weakness, negative for pain in arm and leg or swelling in foot and leg    Objective:  /60   Pulse 76   Ht 5' 6\" (1.676 m)   Wt 218 lb (98.9 kg)   BMI 35.19 kg/m²   Wt Readings from Last 3 Encounters:   09/12/22 218 lb (98.9 kg)   02/28/22 221 lb (100.2 kg)   01/10/22 215 lb (97.5 kg)     Body mass index is 35.19 kg/m². GENERAL - Alert, oriented, pleasant, in no apparent distress. EYES: No jaundice, no conjunctival pallor. Neck - Supple. No jugular venous distention noted. No carotid bruits. Cardiovascular - Normal S1 and S2 without obvious murmur or gallop. Extremities - No cyanosis, clubbing, or significant edema. Pulmonary - No respiratory distress. No wheezes or rales.       MEDICAL DECISION MAKING & DATA REVIEW:    Lab Review   Lab Results   Component Value Date/Time    TROPONINT <0.010 01/03/2022 09:14 AM    TROPONINT <0.010 02/11/2017 06:50 AM     Lab Results   Component Value Date/Time     03/06/2013 07:10 AM    BNP 13 01/04/2013 08:50 PM    PROBNP 309.7 01/03/2022 09:14 AM    PROBNP 319.3 02/10/2017 09:58 PM     Lab Results   Component Value Date    INR 0.81 01/03/2022    INR 1.04 02/08/2013     Lab Results   Component Value Date    LABA1C 7.9 (H) 02/11/2017    LABA1C 7.1 06/01/2015     Lab Results   Component Value Date    WBC 9.3 07/28/2022    WBC 12.0 (H) 01/03/2022    HCT 42.9 07/28/2022    HCT 43.1 01/03/2022    MCV 96.2 07/28/2022    MCV 96.0 01/03/2022     07/28/2022     01/03/2022     Lab Results Component Value Date    CHOL 105 07/28/2022    CHOL 103 06/03/2021    TRIG 70 07/28/2022    TRIG 55 06/03/2021    HDL 47 07/28/2022    HDL 51 06/03/2021    LDLCALC 44 07/28/2022    LDLCALC 92 02/10/2017    LDLDIRECT 33 03/03/2020    LDLDIRECT 66 12/02/2014    CHOLHDLRATIO 2.0 06/03/2021     Lab Results   Component Value Date    ALT 17 07/28/2022    ALT 18 01/03/2022    AST 18 07/28/2022    AST 17 01/03/2022     BMP:    Lab Results   Component Value Date/Time     07/28/2022 10:57 AM     01/03/2022 09:14 AM    K 4.2 07/28/2022 10:57 AM    K 4.2 01/03/2022 09:14 AM     07/28/2022 10:57 AM    CL 98 01/03/2022 09:14 AM    CO2 26 07/28/2022 10:57 AM    CO2 26 01/03/2022 09:14 AM    BUN 15 07/28/2022 10:57 AM    BUN 18 01/03/2022 09:14 AM    CREATININE 1.0 07/28/2022 10:57 AM    CREATININE 1.1 01/03/2022 09:14 AM     CMP:   Lab Results   Component Value Date/Time     07/28/2022 10:57 AM     01/03/2022 09:14 AM    K 4.2 07/28/2022 10:57 AM    K 4.2 01/03/2022 09:14 AM     07/28/2022 10:57 AM    CL 98 01/03/2022 09:14 AM    CO2 26 07/28/2022 10:57 AM    CO2 26 01/03/2022 09:14 AM    BUN 15 07/28/2022 10:57 AM    BUN 18 01/03/2022 09:14 AM    CREATININE 1.0 07/28/2022 10:57 AM    CREATININE 1.1 01/03/2022 09:14 AM    PROT 6.9 07/28/2022 10:57 AM    PROT 7.1 01/03/2022 09:14 AM    PROT 7.7 03/06/2013 07:10 AM    PROT 6.9 02/02/2013 05:00 PM     No results found for: TSH, TSHHS    QUALITY MEASURES REVIEWED:  1.CAD:Patient is taking anti platelet agent:Yes  2. DYSLIPIDEMIA: Patient is on cholesterol lowering medication:Yes   3. Beta-Blocker therapy for CAD, if prior Myocardial Infarction:Yes   4. Counselled regarding smoking cessation. No   Patient does not Smoke. 5.Anticoagulation therapy (for A.Fib) No   Does Not have A.Fib.  6.Discussed weight management strategies.     Assessment & Plan:  Primary / Secondary prevention is the goal by aggressive risk modification, healthy and therapeutic life style changes for cardiovascular risk reduction. CORONARY ARTERY DISEASE:Yes   clinically stable. Patient is on optimal medical regimen ( see medication list above )  - Patient is currently  asymptomatic from CAD. - changes in  treatment:   no, on ASA, Imdur & Coreg.           - Testing ordered:  no  [facility] classification: 1  Stress test: 3/2020   Fair exercise tolerance. 7 METs work load. Physiological BP response to exercise. ETT negative for Ischemia / Arrhythmia. HTN:well controlled on current medical regimen, see list above. - changes in  treatment:   no, on Amlodipine, Lisinopril, HCTZ & COREG      CARDIOMYOPATHY: None known   CONGESTIVE HEART FAILURE: NO KNOWN HISTORY.      VHD: No significant VHD noted  ECHO 3/2022     Left ventricular function and size is normal, EF is estimated at 55-60%. Mild left ventricular hypertrophy. Grade I diastolic dysfunction. No regional wall motion abnormalities were detected. Mildly dilated left atrium. Mild mitral , tricuspid and pulmonic regurgitation is present. RVSP is 23 mmHg. No evidence of pericardial effusion. DYSLIPIDEMIA: Patient's profile is at / near Goal.yes,                                 HDL is low                                Tolerating current medical regimen well yes, takes Lipitor                                                              See most recent Lab values in Labs section above. Diabetes mellitis: .yes,                                      Managed by family MD                                     BS under good control yes,                                      Hgb A1c avilable yes,   CAROTID ARTERY DISEASE: yes, mild               No symptoms described pertaining to Carotid artery disease               Up to date on non invasive testing .yes,                Patient is on Guidelines recommended therapy. yes,       ARRHYTHMIAS: None known    TESTS ORDERED: None this visit     PREVIOUSLY ORDERED TESTS REVIEWED & DISCUSSED WITH THE PATIENT:     I personally reviewed & interpreted, all previously ordered tests as copied above. Latest Labs are pulled in to the note with dates. Labs, specially in Reference to Lipid profile, Cardiac testing in the form of Echo ( dated: ), stress tests ( dated: ) & other relevant cardiac testing reviewed with patient & recommendations made based on assessment of the results. Discussed role of Cardiac risk factors & effects + treatment of co morbidities with patient & advised accordingly. MEDICATIONS: List of medications patient is currently taking is reviewed in detail with the patient & family member present. Discussed any side effects or problems taking the medication. Recommend Continue present management & medications as listed. AFFIRMATION: I spent at least 20 minutes of time reviewing patient's history, previous & current medical problems & all Labs + testing. This includes chart prep even prior to the vosit. Various goals are discussed and multiple questions answered. Relevant concelling performed. Office follow up in six months.

## 2022-09-12 NOTE — LETTER
[facility] [address]  [address] [id number]  Phone: [phone]  Fax: [phone]    Davide Cid MD    September 12, 2022     Gonzalez Bottoms, APRN - NP  [address] [id number]    Patient: Helen Espinoza   MR Number: [id number]   YOB: 1955   Date of Visit: 9/12/2022       Dear Carlos Weber: Thank you for referring Anthony Condon to me for evaluation/treatment. Below are the relevant portions of my assessment and plan of care. If you have questions, please do not hesitate to call me. I look forward to following Samira Hicks along with you.     Sincerely,      Davide Cid MD

## 2022-12-06 ENCOUNTER — HOSPITAL ENCOUNTER (OUTPATIENT)
Age: 67
Setting detail: SPECIMEN
Discharge: HOME | End: 2022-12-06

## 2022-12-07 LAB
ABSOLUTE EOS #: 0.21 K/UL (ref 0–0.44)
ABSOLUTE IMMATURE GRANULOCYTE: 0.04 K/UL (ref 0–0.3)
ABSOLUTE LYMPH #: 2.14 K/UL (ref 1.1–3.7)
ABSOLUTE MONO #: 1.05 K/UL (ref 0.1–1.2)
ALBUMIN SERPL-MCNC: 4.1 G/DL (ref 3.5–5.2)
ALBUMIN/GLOBULIN RATIO: 1.4 (ref 1–2.5)
ALP BLD-CCNC: 85 U/L (ref 40–129)
ALT SERPL-CCNC: 20 U/L (ref 5–41)
ANION GAP SERPL CALCULATED.3IONS-SCNC: 12 MMOL/L (ref 9–17)
AST SERPL-CCNC: 20 U/L
BASOPHILS # BLD: 0 % (ref 0–2)
BASOPHILS ABSOLUTE: 0.04 K/UL (ref 0–0.2)
BILIRUB SERPL-MCNC: 0.4 MG/DL (ref 0.3–1.2)
BUN BLDV-MCNC: 14 MG/DL (ref 8–23)
CALCIUM SERPL-MCNC: 9.9 MG/DL (ref 8.6–10.4)
CHLORIDE BLD-SCNC: 101 MMOL/L (ref 98–107)
CHOLESTEROL/HDL RATIO: 2.3
CHOLESTEROL: 115 MG/DL
CO2: 26 MMOL/L (ref 20–31)
CREAT SERPL-MCNC: 0.97 MG/DL (ref 0.7–1.2)
EOSINOPHILS RELATIVE PERCENT: 2 % (ref 1–4)
FOLATE: >20 NG/ML
GFR SERPL CREATININE-BSD FRML MDRD: >60 ML/MIN/1.73M2
GLUCOSE BLD-MCNC: 76 MG/DL (ref 70–99)
HCT VFR BLD CALC: 43.8 % (ref 40.7–50.3)
HDLC SERPL-MCNC: 51 MG/DL
HEMOGLOBIN: 14.3 G/DL (ref 13–17)
IMMATURE GRANULOCYTES: 0 %
LDL CHOLESTEROL: 45 MG/DL (ref 0–130)
LYMPHOCYTES # BLD: 21 % (ref 24–43)
MCH RBC QN AUTO: 31.4 PG (ref 25.2–33.5)
MCHC RBC AUTO-ENTMCNC: 32.6 G/DL (ref 28.4–34.8)
MCV RBC AUTO: 96.1 FL (ref 82.6–102.9)
MONOCYTES # BLD: 11 % (ref 3–12)
NRBC AUTOMATED: 0 PER 100 WBC
PDW BLD-RTO: 11.8 % (ref 11.8–14.4)
PLATELET # BLD: ABNORMAL K/UL (ref 138–453)
PLATELET, FLUORESCENCE: 182 K/UL (ref 138–453)
PLATELET, IMMATURE FRACTION: 9.1 % (ref 1.1–10.3)
POTASSIUM SERPL-SCNC: 4.1 MMOL/L (ref 3.7–5.3)
PROSTATE SPECIFIC ANTIGEN: 1.04 NG/ML
RBC # BLD: 4.56 M/UL (ref 4.21–5.77)
SEG NEUTROPHILS: 65 % (ref 36–65)
SEGMENTED NEUTROPHILS ABSOLUTE COUNT: 6.53 K/UL (ref 1.5–8.1)
SEX HORMONE BINDING GLOBULIN: 35 NMOL/L (ref 11–80)
SODIUM BLD-SCNC: 139 MMOL/L (ref 135–144)
TESTOSTERONE FREE-NONMALE: 75.4 PG/ML (ref 47–244)
TESTOSTERONE TOTAL: 386 NG/DL (ref 220–1000)
TOTAL PROTEIN: 7.1 G/DL (ref 6.4–8.3)
TRIGL SERPL-MCNC: 95 MG/DL
TSH SERPL DL<=0.05 MIU/L-ACNC: 1.06 UIU/ML (ref 0.3–5)
VITAMIN B-12: 792 PG/ML (ref 232–1245)
VITAMIN D 25-HYDROXY: 74.5 NG/ML
WBC # BLD: 10 K/UL (ref 3.5–11.3)

## 2023-01-04 ENCOUNTER — HOSPITAL ENCOUNTER (OUTPATIENT)
Dept: SLEEP CENTER | Age: 68
Discharge: HOME | End: 2023-01-04
Payer: MEDICARE

## 2023-01-04 VITALS
HEART RATE: 70 BPM | WEIGHT: 208 LBS | DIASTOLIC BLOOD PRESSURE: 66 MMHG | BODY MASS INDEX: 33.43 KG/M2 | OXYGEN SATURATION: 90 % | HEIGHT: 66 IN | SYSTOLIC BLOOD PRESSURE: 133 MMHG

## 2023-01-04 DIAGNOSIS — E66.9 OBESITY (BMI 30-39.9): ICD-10-CM

## 2023-01-04 DIAGNOSIS — G47.10 HYPERSOMNIA: ICD-10-CM

## 2023-01-04 DIAGNOSIS — G47.33 OSA (OBSTRUCTIVE SLEEP APNEA): ICD-10-CM

## 2023-01-04 PROCEDURE — 99211 OFF/OP EST MAY X REQ PHY/QHP: CPT

## 2023-01-04 PROCEDURE — 99204 OFFICE O/P NEW MOD 45 MIN: CPT | Performed by: INTERNAL MEDICINE

## 2023-01-04 RX ORDER — VITAMIN B COMPLEX
1 CAPSULE ORAL DAILY
COMMUNITY

## 2023-01-04 RX ORDER — CHOLECALCIFEROL (VITAMIN D3) 1250 MCG
CAPSULE ORAL
COMMUNITY

## 2023-01-04 RX ORDER — LANOLIN ALCOHOL/MO/W.PET/CERES
500 CREAM (GRAM) TOPICAL NIGHTLY
COMMUNITY

## 2023-01-04 RX ORDER — ASCORBIC ACID 500 MG
500 TABLET ORAL DAILY
COMMUNITY

## 2023-01-04 ASSESSMENT — SLEEP AND FATIGUE QUESTIONNAIRES
HOW LIKELY ARE YOU TO NOD OFF OR FALL ASLEEP WHILE SITTING QUIETLY AFTER LUNCH WITHOUT ALCOHOL: 0
HOW LIKELY ARE YOU TO NOD OFF OR FALL ASLEEP WHILE SITTING AND TALKING TO SOMEONE: 0
HOW LIKELY ARE YOU TO NOD OFF OR FALL ASLEEP WHEN YOU ARE A PASSENGER IN A CAR FOR AN HOUR WITHOUT A BREAK: 0
HOW LIKELY ARE YOU TO NOD OFF OR FALL ASLEEP WHILE WATCHING TV: 0
ESS TOTAL SCORE: 0
HOW LIKELY ARE YOU TO NOD OFF OR FALL ASLEEP IN A CAR, WHILE STOPPED FOR A FEW MINUTES IN TRAFFIC: 0
HOW LIKELY ARE YOU TO NOD OFF OR FALL ASLEEP WHILE SITTING AND READING: 0
NECK CIRCUMFERENCE (INCHES): 18
HOW LIKELY ARE YOU TO NOD OFF OR FALL ASLEEP WHILE LYING DOWN TO REST IN THE AFTERNOON WHEN CIRCUMSTANCES PERMIT: 0
HOW LIKELY ARE YOU TO NOD OFF OR FALL ASLEEP WHILE SITTING INACTIVE IN A PUBLIC PLACE: 0

## 2023-01-04 NOTE — CONSULTS
Tyler Garrido MD, Laura Cherry MD, Ursula Perez MD, [facility]      [address], [address] [facility]   [address]: [phone]  F: [phone]     Subjective:     Patient ID: Brittani Solares is a 69 y.o. male, referred to the sleep center for   Chief Complaint   Patient presents with    Snoring   .     Referring physician:  JAEL Garza NP     History:has been referred for the ESTEFANIA    Symptoms:   []  Snoring                                                                    []  Dry Mouth  []  Choking                                                                   []  Morning Headaches  []  Gasping for Air                                                        []  Trouble Falling asleep  [x]  Tired during the daytime                                         []  Trouble Staying Asleep  [x]  Tired when you wake up                                         [x]  Weight Gain in Last 5 Years  [x]  Wake up frequently at night                                    []  Weight Loss in Last 5 Years  []  Shortness Of Breath                                               []  Shift Worker  []  Coughing                                                                []  Smoker (Previous or Current)  []  Chest Pain                                                              []  Anxiety  []  Trouble keeping your legs still at night                   []  Depression  []  Kicking your legs in your sleep                               []  Insomnia     [] Palpitation  []   Stop breathing      []  Other:     Significant Co-morbidities:  []  Congestive Heart Failure     []  COPD         []  Stroke (Past 30 Days)      []  Supplemental Oxygen Usage       []  Cognitive Impairment      []  Neuromuscular Problems  []  Epilepsy/Neurological Disorders       HAS CAD      Social History     Socioeconomic History    Marital status:      Spouse name: Not on file    Number of children: Not on file    Years of education: Not on file    Highest education level: Not on file   Occupational History    Not on file   Tobacco Use    Smoking status: Never    Smokeless tobacco: Never   Vaping Use    Vaping Use: Never used   Substance and Sexual Activity    Alcohol use: No     Alcohol/week: 0.0 standard drinks    Drug use: Yes     Types: Marijuana Lucianne Bars)     Comment: \"Use Marijuana About Every Other Day\"last 3-27-17    Sexual activity: Never   Other Topics Concern    Not on file   Social History Narrative    Not on file     Social Determinants of Health     Financial Resource Strain: Not on file   Food Insecurity: Not on file   Transportation Needs: Not on file   Physical Activity: Not on file   Stress: Not on file   Social Connections: Not on file   Intimate Partner Violence: Not on file   Housing Stability: Not on file       Prior to Admission medications    Medication Sig Start Date End Date Taking?  Authorizing Provider   niacin 500 MG extended release capsule Take 500 mg by mouth nightly   Yes Historical Provider, MD   b complex vitamins capsule Take 1 capsule by mouth daily   Yes Historical Provider, MD   vitamin C (ASCORBIC ACID) 500 MG tablet Take 500 mg by mouth daily   Yes Historical Provider, MD   Cholecalciferol (VITAMIN D3) 1.25 MG (08557 UT) CAPS Take by mouth   Yes Historical Provider, MD   docusate sodium (COLACE) 100 MG capsule Take 1 capsule by mouth 2 times daily 2/27/20  Yes Julian Esparza MD   TERAZOSIN HCL PO Take by mouth   Yes Historical Provider, MD   amLODIPine (NORVASC) 5 MG tablet Take 5 mg by mouth daily   Yes Historical Provider, MD   atorvastatin (LIPITOR) 10 MG tablet Take 1 tablet by mouth daily 2/27/18  Yes Abad Castrejon MD   lisinopril (PRINIVIL;ZESTRIL) 40 MG tablet Take 1 tablet by mouth daily 2/6/18  Yes Abad Castrejon MD   hydrochlorothiazide (HYDRODIURIL) 25 MG tablet  5/6/17  Yes Historical Provider, MD Lux Sentchristiana 100 UNIT/ML injection pen  5/12/17  Yes Historical Provider, MD   metFORMIN (GLUCOPHAGE) 1000 MG tablet Take 1,000 mg by mouth 2 times daily (with meals)   Yes Historical Provider, MD   aspirin EC 81 MG EC tablet Take 1 tablet by mouth daily 2/8/17  Yes Abad Castrejon MD   carvedilol (COREG) 25 MG tablet Take 1 tablet by mouth 2 times daily 2/8/17  Yes Abad Castrejon MD   Insulin Aspart (NOVOLOG FLEXPEN SC) Inject 20 Units into the skin Per Sliding Scale    Yes Historical Provider, MD   acetaminophen (AMINOFEN) 325 MG tablet Take 2 tablets by mouth every 6 hours as needed for Pain  Patient not taking: Reported on 1/4/2023 12/5/21   Baldo Villarreal MD   isosorbide mononitrate (IMDUR) 30 MG extended release tablet Take 1 tablet by mouth 2 times daily  Patient not taking: Reported on 1/4/2023 10/11/19   JAEL Merlos - CNP       Allergies as of 01/04/2023 - Fully Reviewed 01/04/2023   Allergen Reaction Noted    Pcn [penicillins] Hives, Itching, and Rash     Morphine Itching 10/05/2015       Patient Active Problem List   Diagnosis    CAD (coronary artery disease)    Obesity    S/P CABG x 4    History of PTCA    Hypertension    Kidney stone    Diabetes mellitus ([facility] [address]    History of shingles    Hyperlipidemia    H/O echocardiogram    Acute medial meniscal injury of knee    Lateral meniscus tear    Chondromalacia of medial tibial plateau    Hypertensive emergency    PVD (peripheral vascular disease) (HCC)    Neuropathy    Grade II hemorrhoids    ESTEFANIA (obstructive sleep apnea)    Hypersomnia    Obesity (BMI 30-39. 9)       Past Medical History:   Diagnosis Date    Angina at rest [facility])     Arthritis     \"Hands, Knees, Shoulders\"    CAD (coronary artery disease)     Sees Dr. Abad Castrejon    Carotid  Doppler 03/13/2017    Duplex sonography with color flow enhancement was performed bilaterally on the cervical carotid system. No evidence of hemodynamically significant stenosis in the bilateral internal carotid arteries. There is evidence of hemodynamically significant stenosis of the bilateral external carotid arteries, L > R.    Cellulitis 2/2/2013    5th finger-Left hand-ER visit -report in Epic    Chronic back pain     Diabetes mellitus ([facility] [address] Dx 2007    Fractured rib Dx 9-13    Full dentures     Gonorrhea Dx 1970's    \"Had Treatment\"    H/O cardiac catheterization 2/5/2013 2/5/2013-Severe 3 vessel CAD-recomm CABG- Dr Joshua Esquivel - report in epic    H/O cardiovascular stress test 2/4/2013 2/4/2013-mod ischemia inferior wall. EF 53%. LVSF normal-Dr Selin Padilla - report in [facility]    H/O cardiovascular stress test 1/16/2015    treadmill    H/O Doppler ultrasound 2/6/2013    CAROTID DOPPLER-2/6/2013-There is no hemodynamic significant stenosis present.estimated 16-49 % stenosis of both internal carotid arteries. - report in epic    H/O echocardiogram 03/08/2013    [id number]%, LV systolic function is borderline reduced, , abnormal LV diastolic function Stage 1, septal motion is consistent with post-operative state. , no pericardial effusion. Heart attack ([facility] [address] 1993    History of cardiovascular stress test 03/13/2017    Normal perfusion study with normal distribution in all coronal, short, and horizontal axis. Normal LV function & wall motion. LVEF is 55 %    History of echocardiogram 03/13/2017    Normal LV systolic functionEjection fraction is visually estimated at 55%. Mild concentric left ventricular hypertrophy. Grade I diastolic dysfunction. No regional wall motion abnormalites. The left atrium is mildly dilated. No significant valvular disease noted. Mild tricuspid regurgitation. No evidence of pericardial effusion. History of exercise stress test 03/05/2020    Treadmill,  Physiological BP response to  exercise   ETT negative for Ischemia / Arrhythmia.     History of PTCA 11/2010 1 stent 12/2010 2 stents    stents 3 mid lad prox lad & rca    History of shingles Last Episode In 2000's    \"Face, Torso\"    Hx of Carotid Doppler 03/04/2019 Mild (0-49%) disease of the bilateral internal carotid, significant stenosis of the bilateral external carotid arteries L>R, Normal right vertebral flow, left vertebral flow non visualized    Hyperlipidemia     Hypertension     Kidney stone 1990's    Kidney Stone Surgery    S/P CABG x 4 2/8/2013 2/8/2013-LIMA to LAD; VG to OM;VG to Diagonal; VG to RCA -Dr Price Breath- report [facility] Patient states that he started Valtrex for this 6/30/14.     Shortness of breath on exertion     Wears glasses        Past Surgical History:   Procedure Laterality Date    CARDIAC CATHETERIZATION  2/5/2013 2/5/2013-Severe 3 vessel disease- Recomm CABG- report in [address]  2-8-13    CABG (4 Bypasses)    COLONOSCOPY  Last Done 10-8-15    CORONARY ANGIOPLASTY  11-10    One Heart Stent    CORONARY ANGIOPLASTY  1-11-11    Two Heart Stents    CORONARY ARTERY BYPASS GRAFT  2/8/2013 2/8/2013- CABG X 4-LIMA to LAD; VG to OM;VG to Diagonal; VG to RCA -Dr Kerry Low      All Teeth Extracted In Past    ENDOSCOPY, COLON, DIAGNOSTIC  03/28/2017    normal esophagus, gastritis found, moderate amount of food particles in the stomach    INGUINAL HERNIA REPAIR Left 1971    KIDNEY STONE SURGERY  1990's    KNEE ARTHROSCOPY Left 5/26/16    Partial medial and lateral meniscectomy & chondroplasty       Family History   Problem Relation Age of Onset    Heart Disease Mother         Heart Attack    Early Death Mother         Heart Attack    Early Death Brother         64 Or 62    Substance Abuse Brother         Alcoholic    Early Death Father 48        \"He Drank Himself To Death\"    Substance Abuse Father         Alcohol    Cirrhosis Father     Early Death Sister 2        Leukemia    Cancer Sister         Leukemia    Early Death Sister 61        Stroke    Stroke Sister     Arthritis Sister     Heart Disease Sister     Vision Loss Sister         \"RP\"         Objective:   /66   Pulse 70   Ht 5' 6\" (1.676 m) Wt 208 lb (94.3 kg)   SpO2 90%   BMI 33.57 kg/m²   Body mass index is 33.57 kg/m². Sleep Medicine 1/4/2023   Sitting and reading 0   Watching TV 0   Sitting, inactive in a public place (e.g. a theatre or a meeting) 0   As a passenger in a car for an hour without a break 0   Lying down to rest in the afternoon when circumstances permit 0   Sitting and talking to someone 0   Sitting quietly after a lunch without alcohol 0   In a car, while stopped for a few minutes in traffic 0   [place] Sleepiness Score 0   Neck circumference (Inches) 18     Mallampati 3    Vitals:    01/04/23 1255   BP: 133/66   Pulse: 70   SpO2: 90%   Weight: 208 lb (94.3 kg)   Height: 5' 6\" (1.676 m)     Neck circumference (Inches): 18  Inches  [place] Sleepiness Score: 0    Gen: No distress. Eyes: PERRL. No sclera icterus. No conjunctival injection. ENT: No discharge. Pharynx clear. External appearance of ears and nose normal.  Neck: Trachea midline. No obvious mass. Resp: No accessory muscle use. No crackles. No wheezes. No rhonchi. No dullness on percussion. CV: Regular rate. Regular rhythm. No murmur or rub. No edema. GI: Non-tender. Non-distended. No hernia. Skin: Warm, dry, normal texture and turgor. No nodule on exposed extremities. Lymph: No cervical LAD. No supraclavicular LAD. M/S: No cyanosis. No clubbing. No joint deformity. Psych: Oriented x 3. No anxiety. Awake. Alert. Intact judgement and insight. Mallampati Airway Classification:   []1 []2 [x]3 []4        Assessment and Plan     Diagnosis:    Problem List          Respiratory    ESTEFANIA (obstructive sleep apnea)     He has symptoms of ESTEFANIA  Advised to go for the PSG  Loose weight         Relevant Orders    Baseline Diagnostic Sleep Study       Other    Hypersomnia      He has symptoms of ESTEFANIA  Advised to go for the PSG  Loose weight         Obesity (BMI 30-39. 9)      Advised to loose weight         Relevant Medications    Insulin Aspart (NOVOLOG FLEXPEN SC)    metFORMIN (GLUCOPHAGE) 1000 MG tablet    BASAGLAR KWIKPEN 100 UNIT/ML injection pen             Additional Plan:     [x]  Sleep hygiene/ relaxation methods & CBTi principles review with patient   [x]  Avoid supine/back sleep until sleep study   [x]  Driving precautions   [x]  Medical consequences of untreated ESTEFANIA   [x]  Weight loss recommendations   [x]  Diet recommendations   [x]  Exercise   []  Advised to quit smoking       []  PFT referral   []  Bariatric Program referral      Follow-Up:    No follow-ups on file.     Electronically signed by Joseph Barr MD on 1/4/2023 at 1:30 PM

## 2023-03-01 ENCOUNTER — HOSPITAL ENCOUNTER (EMERGENCY)
Age: 68
Discharge: HOME | End: 2023-03-01
Payer: MEDICAID

## 2023-03-01 ENCOUNTER — APPOINTMENT (OUTPATIENT)
Dept: GENERAL RADIOLOGY | Age: 68
End: 2023-03-01
Payer: MEDICAID

## 2023-03-01 VITALS
RESPIRATION RATE: 16 BRPM | DIASTOLIC BLOOD PRESSURE: 66 MMHG | HEART RATE: 76 BPM | SYSTOLIC BLOOD PRESSURE: 155 MMHG | OXYGEN SATURATION: 96 % | TEMPERATURE: 98.7 F
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
## 2023-03-01 DIAGNOSIS — S61.411A LACERATION OF RIGHT HAND WITHOUT FOREIGN BODY, INITIAL ENCOUNTER: Primary | ICD-10-CM

## 2023-03-01 DIAGNOSIS — S61.431A PUNCTURE WOUND OF RIGHT HAND WITHOUT FOREIGN BODY, INITIAL ENCOUNTER: ICD-10-CM

## 2023-03-01 PROCEDURE — 12002 RPR S/N/AX/GEN/TRNK2.6-7.5CM: CPT

## 2023-03-01 PROCEDURE — 99283 EMERGENCY DEPT VISIT LOW MDM: CPT

## 2023-03-01 PROCEDURE — 73130 X-RAY EXAM OF HAND: CPT

## 2023-03-01 RX ORDER — DOXYCYCLINE HYCLATE 100 MG
100 TABLET ORAL 2 TIMES DAILY
Qty: 14 TABLET | Refills: 0 | Status: SHIPPED | OUTPATIENT
Start: 2023-03-01 | End: 2023-03-08

## 2023-03-01 ASSESSMENT — PAIN DESCRIPTION - LOCATION: LOCATION: HAND

## 2023-03-01 ASSESSMENT — PAIN SCALES - GENERAL: PAINLEVEL_OUTOF10: 3

## 2023-03-01 ASSESSMENT — PAIN DESCRIPTION - ORIENTATION: ORIENTATION: RIGHT

## 2023-03-01 NOTE — ED NOTES
Patient discharging home, AVS reviewed with no questions at this time. Patient instrcuted to follow up per discharge instructions and to return for worsening symptoms.  Respirations equal and unlabored, skin PWD.]     Chapin Craft RN  03/01/23 [id number]

## 2023-03-01 NOTE — ED PROVIDER NOTES
[address] ENCOUNTER        Pt Name: Gina Evans  MRN: [id number]  [place] 1955  Date of evaluation: 3/1/2023  Provider: JAEL Dixon - CNP  PCP: JAEL Saab - NP    YARELI. I have evaluated this patient. My supervising physician was available for consultation. Triage CHIEF COMPLAINT       Chief Complaint   Patient presents with    Animal Bite     Bite on right hand from a rotweiler. Neighbors dog, [place] if dog is up to date on its shots         HISTORY OF PRESENT ILLNESS      Chief Complaint: right hand laceration     Gina Evans is a 69 y.o. male who presents for right hand injury and laceration after the neighbors Rottweiler bit him. No other injuries Does have some discomfort in right hand with movement. Tetanus up to date. Nursing Notes were all reviewed and agreed with or any disagreements were addressed in the HPI. REVIEW OF SYSTEMS     Pertinent ROS as noted in HPI. PAST MEDICAL HISTORY     Past Medical History:   Diagnosis Date    Angina at rest [facility])     Arthritis     \"Hands, Knees, Shoulders\"    CAD (coronary artery disease)     Sees Dr. Ben Ureña    Carotid  Doppler 03/13/2017    Duplex sonography with color flow enhancement was performed bilaterally on the cervical carotid system. No evidence of hemodynamically significant stenosis in the bilateral internal carotid arteries. There is evidence of hemodynamically significant stenosis of the bilateral external carotid arteries, L > R.    Cellulitis 2/2/2013    5th finger-Left hand-ER visit -report in Epic    Chronic back pain     Diabetes mellitus ([address] Dx 2007    Fractured rib Dx 9-13    Full dentures     Gonorrhea Dx 1970's    \"Had Treatment\"    H/O cardiac catheterization 2/5/2013 2/5/2013-Severe 3 vessel CAD-recomm CABG- Dr Ken Solares - report in epic    H/O cardiovascular stress test 2/4/2013 2/4/2013-mod ischemia inferior wall. EF 53%. LVSF normal-Dr Solitario Flores - report in epic    H/O cardiovascular stress test 1/16/2015    treadmill    H/O Doppler ultrasound 2/6/2013    CAROTID DOPPLER-2/6/2013-There is no hemodynamic significant stenosis present.estimated 16-49 % stenosis of both internal carotid arteries. - report in epic    H/O echocardiogram 03/08/2013    GP44-42%, LV systolic function is borderline reduced, , abnormal LV diastolic function Stage 1, septal motion is consistent with post-operative state. , no pericardial effusion. Heart attack ([address] 1993    History of cardiovascular stress test 03/13/2017    Normal perfusion study with normal distribution in all coronal, short, and horizontal axis. Normal LV function & wall motion. LVEF is 55 %    History of echocardiogram 03/13/2017    Normal LV systolic functionEjection fraction is visually estimated at 55%. Mild concentric left ventricular hypertrophy. Grade I diastolic dysfunction. No regional wall motion abnormalites. The left atrium is mildly dilated. No significant valvular disease noted. Mild tricuspid regurgitation. No evidence of pericardial effusion. History of exercise stress test 03/05/2020    Treadmill,  Physiological BP response to  exercise   ETT negative for Ischemia / Arrhythmia. History of PTCA 11/2010 1 stent 12/2010 2 stents    stents 3 mid lad prox lad & rca    History of shingles Last Episode In 2000's    \"Face, Torso\"    Hx of Carotid Doppler 03/04/2019    Mild (0-49%) disease of the bilateral internal carotid, significant stenosis of the bilateral external carotid arteries L>R, Normal right vertebral flow, left vertebral flow non visualized    Hyperlipidemia     Hypertension     Kidney stone 1990's    Kidney Stone Surgery    S/P CABG x 4 2/8/2013 2/8/2013-LIMA to LAD; VG to OM;VG to Diagonal; VG to RCA -Dr Rachel Patel- report in epic    Shingles Patient states that he started Valtrex for this 6/30/14.     Shortness of breath on exertion     Wears glasses        SURGICAL HISTORY     Past Surgical History:   Procedure Laterality Date    CARDIAC CATHETERIZATION  2/5/2013 2/5/2013-Severe 3 vessel disease- Recomm CABG- report in [facility]  2-8-13    CABG (4 Bypasses)    COLONOSCOPY  Last Done 10-8-15    CORONARY ANGIOPLASTY  11-10    One Heart Stent    CORONARY ANGIOPLASTY  1-11-11    Two Heart Stents    CORONARY ARTERY BYPASS GRAFT  2/8/2013 2/8/2013- CABG X 4-LIMA to LAD; VG to OM;VG to Diagonal; VG to RCA -Dr Nancy Montoya      All Teeth Extracted In Past    ENDOSCOPY, COLON, DIAGNOSTIC  03/28/2017    normal esophagus, gastritis found, moderate amount of food particles in the stomach    [address]  [address]    KNEE ARTHROSCOPY Left 5/26/16    Partial medial and lateral meniscectomy & chondroplasty       CURRENTMEDICATIONS       Discharge Medication List as of 3/1/2023  4:18 PM        CONTINUE these medications which have NOT CHANGED    Details   niacin 500 MG extended release capsule Take 500 mg by mouth nightlyHistorical Med      b complex vitamins capsule Take 1 capsule by mouth dailyHistorical Med      vitamin C (ASCORBIC ACID) 500 MG tablet Take 500 mg by mouth dailyHistorical Med      Cholecalciferol (VITAMIN D3) 1.25 MG (81665 UT) CAPS Take by mouthHistorical Med      acetaminophen (AMINOFEN) 325 MG tablet Take 2 tablets by mouth every 6 hours as needed for Pain, Disp-120 tablet, R-0Normal      docusate sodium (COLACE) 100 MG capsule Take 1 capsule by mouth 2 times daily, Disp-100 capsule, R-0Print      TERAZOSIN HCL PO Take by mouthHistorical Med      isosorbide mononitrate (IMDUR) 30 MG extended release tablet Take 1 tablet by mouth 2 times daily, Disp-60 tablet, R-5Normal      amLODIPine (NORVASC) 5 MG tablet Take 5 mg by mouth dailyHistorical Med      atorvastatin (LIPITOR) 10 MG tablet Take 1 tablet by mouth daily, Disp-90 tablet, R-3Normal      lisinopril (PRINIVIL;ZESTRIL) 40 MG tablet Take 1 tablet by mouth daily, Disp-90 tablet, R-3Normal      hydrochlorothiazide (HYDRODIURIL) 25 MG tablet R-5Historical Med      BASAGLAR KWIKPEN 100 UNIT/ML injection pen R-6, DAWHistorical Med      metFORMIN (GLUCOPHAGE) 1000 MG tablet Take 1,000 mg by mouth 2 times daily (with meals)Historical Med      aspirin EC 81 MG EC tablet Take 1 tablet by mouth daily, Disp-90 tablet, R-3      carvedilol (COREG) 25 MG tablet Take 1 tablet by mouth 2 times daily, Disp-180 tablet, R-3      Insulin Aspart (NOVOLOG FLEXPEN SC) Inject 20 Units into the skin Per Sliding Scale              ALLERGIES     Pcn [penicillins] and Morphine    FAMILYHISTORY       Family History   Problem Relation Age of Onset    Heart Disease Mother         Heart Attack    Early Death Mother         Heart Attack    Early Death Brother         64 Or 62    Substance Abuse Brother         Alcoholic    Early Death Father 48        \"He Drank Himself To Death\"    Substance Abuse Father         Alcohol    Cirrhosis Father     Early Death Sister 2        Leukemia    Cancer Sister         Leukemia    Early Death Sister 61        Stroke    Stroke Sister     Arthritis Sister     Heart Disease Sister     Vision Loss Sister         \"RP\"        SOCIAL HISTORY       Social History     Socioeconomic History    Marital status:      Spouse name: None    Number of children: None    Years of education: None    Highest education level: None   Tobacco Use    Smoking status: Never    Smokeless tobacco: Never   Vaping Use    Vaping Use: Never used   Substance and Sexual Activity    Alcohol use: No     Alcohol/week: 0.0 standard drinks    Drug use: Yes     Types: Marijuana Efrain Ion)     Comment: \"Use Marijuana About Every Other Day\"last 3-27-17    Sexual activity: Never       SCREENINGS    [place] Coma Scale  Eye Opening: Spontaneous  Best Verbal Response: Oriented  Best Motor Response: Obeys commands  [place] Coma Scale Score: 15 PHYSICAL EXAM       ED Triage Vitals [03/01/23 1347]   BP Temp Temp Source Heart Rate Resp SpO2 Height Weight   (!) 155/66 98.7 °F (37.1 °C) Oral 76 16 96 % -- --        Constitutional:  Well developed, Well nourished. No distress  HENT:  Normocephalic, Atraumatic  Neck/Lymphatics: supple, no swollen nodes  Cardiovascular:  Distal cap refill and pulses intact right upper extremities. Respiratory:   Nonlabored breathing. Musculoskeletal:  Right hand is minimally tender over the metacarpals, there is ecchymosis over the distal 3rd and 4th MCP. There is normal ROM of fingers without tenderness. Integument:   Warm, Dry.  2cm laceration over the palm of the right hand below the 5th MCP. Multiple superficial puncture wounds over the dorsal aspect of the hand. Neurologic:  Sensation intact. Psychiatric:  Affect normal, Mood normal.     DIAGNOSTIC RESULTS   LABS:    Labs Reviewed - No data to display    When ordered, only abnormal lab results are displayed. All other labs were within normal range or not returned as of this dictation. RADIOLOGY:   Non-plain film images such as CT, Ultrasound and MRI are read by the radiologist. Plain radiographic images are visualized and preliminarily interpreted by the  ED Provider with the below findings:    Interpretation perthe Radiologist below, if available at the time of this note:    XR HAND RIGHT (MIN 3 VIEWS)   Final Result   Polyarticular advanced degenerative arthritic change, old/healed distal   radial fracture with posttraumatic ulnar variance. Otherwise, no acute   radiographic abnormality of the right hand. No results found.       PROCEDURES   Unless otherwise noted below, none     ________________________________________________________________________    Procedure Note - JAEL Crespo - CNP    Laceration Repair Procedure Note    Indication: Skin Laceration    Procedure:   - Procedure explained, including risks and benefits explained to the patient who expressed understanding. All questions were answered. Verbal consent obtained. - The Wound was prepped and draped in the usual sterile fashion using Betadine and sterile saline.  - The wound is anesthetized using 1% Lidocaine, approximately 2 ml  - Wound was explored to it's depth,  no compromise of neurovascular structures, no foreign bodies. - Wound was irrigated with copious amounts of sterile saline and mechanically debrided utilizing sterile gauze. - The laceration was Closed with 5-0 sutures, total number of 5,  simple interrupted  - Hemostasis and good cosmesis was achieved. Blood loss minimal.  - The wound area was then dressed with Sterile nonstick dressing, sterile gauze, and tape. - Patient tolerated procedure well without complications. Post procedure exam of the affected region reveals distal sensation, motor, capillary refill, and pulses intact    Total repaired wound length: 3cm    Discussed with Pt (and/or family member) at bedside today:  I discussed possibility of infection, retained foreign body, tendon injury, nerve injury. Wound care and scar minimization education was provided. Instructions were given to return for increasing pain, redness, streaking, discharge, or any other worsening or worrisome concerns. Wound check in 48 hours. Suture/Staple removal in 7-10 days. ________________________________________________________________________          CRITICAL CARE   CRITICAL CARE NOTE:   N/A    CONSULTS:  None      VITALS:   Vitals:    Vitals:    03/01/23 1347   BP: (!) 155/66   Pulse: 76   Resp: 16   Temp: 98.7 °F (37.1 °C)   TempSrc: Oral   SpO2: 96%       EMERGENCY DEPARTMENT COURSE and MDM:   Patient presents as above. Emergent etiologies considered. Patient seen and examined. Work-up initiated secondary to presentation, physical exam findings, vital signs and medical chart review.       Sepsis Consideration:   Exclusion criteria - the patient is NOT to be included for SEP-1 Core Measure due to: Infection is not suspected     History from : Patient    Limitations to history : None    Patient was given the following medications:  Medications - No data to display    Imaging Interpretation: The following images were independently interpreted by me and subsequently confirmed by radiology:  right hand xray revealed no acute fractures. Telemetry: Not Applicable    ECG Interpretation: N/A    Chronic conditions affecting care:     Discussion with Other Profesionals : None    Social Determinants : None    Records Reviewed : None    In brief, patient presented for evaluation of a dog bite to the right hand that occurred just prior to arrival.  He did have evidence of contusion over the dorsal surface of the right hand but x-ray was negative. Laceration was repaired without difficulty. Discussed wound care. Patient was placed on doxycycline prophylactically. Encouraged to follow-up for wound check in the next 48 hours and have sutures removed by primary care in 7 to 10 days. Patient verbalized understanding and agreement feels comfortable discharge at this time. I am the Primary Clinician of Record. CLINICAL IMPRESSION      1. Laceration of right hand without foreign body, initial encounter    2. Puncture wound of right hand without foreign body, initial encounter    3. Dog bite, initial encounter          DISPOSITION/PLAN   DISPOSITION Decision To Discharge 03/01/2023 04:10:29 PM      PATIENT REFERREDTO:  Jessi Serra, APRN - NP  [address] [facility] [id number]      7-10 days for suture removal.  Wound check in 48 hours for infection.     DISCHARGE MEDICATIONS:  Discharge Medication List as of 3/1/2023  4:18 PM        START taking these medications    Details   doxycycline hyclate (VIBRA-TABS) 100 MG tablet Take 1 tablet by mouth 2 times daily for 7 days, Disp-14 tablet, R-0Normal             DISCONTINUED MEDICATIONS:  Discharge Medication List as of 3/1/2023  4:18 PM                 (Please note that portions ofthis note were completed with a voice recognition program.  Efforts were made to edit the dictations but occasionally words are mis-transcribed.)    JAEL Lindsey CNP (electronically signed)              JAEL Chandra CNP  03/03/23 7070

## 2023-03-16 ENCOUNTER — OFFICE VISIT (OUTPATIENT)
Dept: CARDIOLOGY CLINIC | Age: 68
End: 2023-03-16
Payer: COMMERCIAL

## 2023-03-16 VITALS
DIASTOLIC BLOOD PRESSURE: 78 MMHG | BODY MASS INDEX: 32.62 KG/M2 | HEIGHT: 66 IN | SYSTOLIC BLOOD PRESSURE: 120 MMHG | HEART RATE: 82 BPM | WEIGHT: 203 LBS

## 2023-03-16 DIAGNOSIS — I10 PRIMARY HYPERTENSION: ICD-10-CM

## 2023-03-16 DIAGNOSIS — E78.5 HYPERLIPIDEMIA, UNSPECIFIED HYPERLIPIDEMIA TYPE: ICD-10-CM

## 2023-03-16 DIAGNOSIS — E66.9 OBESITY (BMI 30-39.9): ICD-10-CM

## 2023-03-16 DIAGNOSIS — Z98.61 HISTORY OF PTCA: ICD-10-CM

## 2023-03-16 DIAGNOSIS — I25.810 CORONARY ARTERY DISEASE INVOLVING CORONARY BYPASS GRAFT OF NATIVE HEART WITHOUT ANGINA PECTORIS: Primary | ICD-10-CM

## 2023-03-16 DIAGNOSIS — I73.9 PVD (PERIPHERAL VASCULAR DISEASE) (HCC): ICD-10-CM

## 2023-03-16 DIAGNOSIS — E11.59 TYPE 2 DIABETES MELLITUS WITH OTHER CIRCULATORY COMPLICATION, UNSPECIFIED WHETHER LONG TERM INSULIN USE (HCC): ICD-10-CM

## 2023-03-16 DIAGNOSIS — Z95.1 S/P CABG X 4: ICD-10-CM

## 2023-03-16 DIAGNOSIS — G47.33 OSA (OBSTRUCTIVE SLEEP APNEA): ICD-10-CM

## 2023-03-16 PROCEDURE — 3017F COLORECTAL CA SCREEN DOC REV: CPT | Performed by: INTERNAL MEDICINE

## 2023-03-16 PROCEDURE — 3074F SYST BP LT 130 MM HG: CPT | Performed by: INTERNAL MEDICINE

## 2023-03-16 PROCEDURE — 3078F DIAST BP <80 MM HG: CPT | Performed by: INTERNAL MEDICINE

## 2023-03-16 PROCEDURE — 1036F TOBACCO NON-USER: CPT | Performed by: INTERNAL MEDICINE

## 2023-03-16 PROCEDURE — 2022F DILAT RTA XM EVC RTNOPTHY: CPT | Performed by: INTERNAL MEDICINE

## 2023-03-16 PROCEDURE — 1123F ACP DISCUSS/DSCN MKR DOCD: CPT | Performed by: INTERNAL MEDICINE

## 2023-03-16 PROCEDURE — 3046F HEMOGLOBIN A1C LEVEL >9.0%: CPT | Performed by: INTERNAL MEDICINE

## 2023-03-16 PROCEDURE — G8427 DOCREV CUR MEDS BY ELIG CLIN: HCPCS | Performed by: INTERNAL MEDICINE

## 2023-03-16 PROCEDURE — G8417 CALC BMI ABV UP PARAM F/U: HCPCS | Performed by: INTERNAL MEDICINE

## 2023-03-16 PROCEDURE — G8484 FLU IMMUNIZE NO ADMIN: HCPCS | Performed by: INTERNAL MEDICINE

## 2023-03-16 PROCEDURE — 99213 OFFICE O/P EST LOW 20 MIN: CPT | Performed by: INTERNAL MEDICINE

## 2023-03-16 RX ORDER — ATORVASTATIN CALCIUM 40 MG/1
TABLET, FILM COATED ORAL
COMMUNITY
Start: 2023-02-04

## 2023-03-16 NOTE — PROGRESS NOTES
OFFICE PROGRESS NOTES      Alirio Douglas is a 69 y.o. male who has    CHIEF COMPLAINT AS FOLLOWS:  CHEST PAIN: . Patient denies any C/O chest pains at this time. SOB: No C/O SOB at this time. LEG EDEMA: No leg edema   PALPITATIONS: Denies any C/O Palpitations   DIZZINESS: No C/O Dizziness                           SYNCOPE: None   OTHER/ ADDITIONAL COMPLAINTS:                                     HPI: Patient is here for F/U on his CAD, HTN & Dyslipidemia problems. CAD: Patient has known CAD. Had angioplasty / CABG, both in the past.  HTN: Patient has known essential HTN. Has been treated with guideline recommended medical / physical/ diet therapy as stated below. Dyslipidemia: Patient has known mixed dyslipidemia. Has been treated with guideline recommended medical / physical/ diet therapy as stated below.                 Current Outpatient Medications   Medication Sig Dispense Refill    atorvastatin (LIPITOR) 40 MG tablet TAKE ONE TABLET BY MOUTH EVERY DAY      niacin 500 MG extended release capsule Take 500 mg by mouth nightly      b complex vitamins capsule Take 1 capsule by mouth daily      vitamin C (ASCORBIC ACID) 500 MG tablet Take 500 mg by mouth daily      Cholecalciferol (VITAMIN D3) 1.25 MG (11868 UT) CAPS Take by mouth      docusate sodium (COLACE) 100 MG capsule Take 1 capsule by mouth 2 times daily 100 capsule 0    TERAZOSIN HCL PO Take by mouth      amLODIPine (NORVASC) 5 MG tablet Take 5 mg by mouth daily      lisinopril (PRINIVIL;ZESTRIL) 40 MG tablet Take 1 tablet by mouth daily 90 tablet 3    hydrochlorothiazide (HYDRODIURIL) 25 MG tablet   5    BASAGLAR KWIKPEN 100 UNIT/ML injection pen   6    metFORMIN (GLUCOPHAGE) 1000 MG tablet Take 1,000 mg by mouth 2 times daily (with meals)      aspirin EC 81 MG EC tablet Take 1 tablet by mouth daily 90 tablet 3    carvedilol (COREG) 25 MG tablet Take 1 tablet by mouth 2 times daily 180 tablet 3    Insulin Aspart (NOVOLOG FLEXPEN SC) Inject 20 Units into the skin Per Sliding Scale       acetaminophen (AMINOFEN) 325 MG tablet Take 2 tablets by mouth every 6 hours as needed for Pain (Patient not taking: No sig reported) 120 tablet 0    isosorbide mononitrate (IMDUR) 30 MG extended release tablet Take 1 tablet by mouth 2 times daily (Patient not taking: No sig reported) 60 tablet 5    atorvastatin (LIPITOR) 10 MG tablet Take 1 tablet by mouth daily (Patient not taking: Reported on 3/16/2023) 90 tablet 3     No current facility-administered medications for this visit.     Allergies: Pcn [penicillins] and Morphine  Review of Systems:    Constitutional: Negative for diaphoresis and fatigue  Respiratory: Negative for shortness of breath  Cardiovascular: Negative for chest pain, dyspnea on exertion, claudication, edema, irregular heartbeat, murmur, palpitations or shortness of breath  Musculoskeletal: Negative for muscle pain, muscular weakness, negative for pain in arm and leg or swelling in foot and leg    Objective:  /78 (Site: Left Upper Arm, Position: Sitting, Cuff Size: Large Adult)   Pulse 82   Ht 5' 6\" (1.676 m)   Wt 203 lb (92.1 kg)   BMI 32.77 kg/m²   Wt Readings from Last 3 Encounters:   03/16/23 203 lb (92.1 kg)   01/04/23 208 lb (94.3 kg)   09/12/22 218 lb (98.9 kg)     Body mass index is 32.77 kg/m².  GENERAL - Alert, oriented, pleasant, in no apparent distress.  EYES: No jaundice, no conjunctival pallor.  Neck - Supple.  No jugular venous distention noted. No carotid bruits.   Cardiovascular - Normal S1 and S2 without obvious murmur or gallop.    Extremities - No cyanosis, clubbing, or significant edema.    Pulmonary - No respiratory distress.  No wheezes or rales.      MEDICAL DECISION MAKING & DATA REVIEW:    Lab Review   Lab Results   Component Value Date/Time    TROPONINT <0.010 01/03/2022 09:14 AM    TROPONINT <0.010 02/11/2017 06:50 AM     Lab Results   Component Value Date/Time     03/06/2013 07:10 AM    BNP 13 01/04/2013  08:50 PM    PROBNP 309.7 01/03/2022 09:14 AM    PROBNP 319.3 02/10/2017 09:58 PM     Lab Results   Component Value Date    INR 0.81 01/03/2022    INR 1.04 02/08/2013     Lab Results   Component Value Date    LABA1C 7.9 (H) 02/11/2017    LABA1C 7.1 06/01/2015     Lab Results   Component Value Date    WBC 10.0 12/06/2022    WBC 9.3 07/28/2022    HCT 43.8 12/06/2022    HCT 42.9 07/28/2022    MCV 96.1 12/06/2022    MCV 96.2 07/28/2022    PLT See Reflexed IPF Result 12/06/2022     07/28/2022     Lab Results   Component Value Date    CHOL 115 12/06/2022    CHOL 105 07/28/2022    TRIG 95 12/06/2022    TRIG 70 07/28/2022    HDL 51 12/06/2022    HDL 47 07/28/2022    LDLCALC 44 07/28/2022    LDLCALC 92 02/10/2017    LDLDIRECT 33 03/03/2020    LDLDIRECT 66 12/02/2014    CHOLHDLRATIO 2.3 12/06/2022    CHOLHDLRATIO 2.0 06/03/2021     Lab Results   Component Value Date    ALT 20 12/06/2022    ALT 17 07/28/2022    AST 20 12/06/2022    AST 18 07/28/2022     BMP:    Lab Results   Component Value Date/Time     12/06/2022 04:02 PM     07/28/2022 10:57 AM    K 4.1 12/06/2022 04:02 PM    K 4.2 07/28/2022 10:57 AM     12/06/2022 04:02 PM     07/28/2022 10:57 AM    CO2 26 12/06/2022 04:02 PM    CO2 26 07/28/2022 10:57 AM    BUN 14 12/06/2022 04:02 PM    BUN 15 07/28/2022 10:57 AM    CREATININE 0.97 12/06/2022 04:02 PM    CREATININE 1.0 07/28/2022 10:57 AM     CMP:   Lab Results   Component Value Date/Time     12/06/2022 04:02 PM     07/28/2022 10:57 AM    K 4.1 12/06/2022 04:02 PM    K 4.2 07/28/2022 10:57 AM     12/06/2022 04:02 PM     07/28/2022 10:57 AM    CO2 26 12/06/2022 04:02 PM    CO2 26 07/28/2022 10:57 AM    BUN 14 12/06/2022 04:02 PM    BUN 15 07/28/2022 10:57 AM    CREATININE 0.97 12/06/2022 04:02 PM    CREATININE 1.0 07/28/2022 10:57 AM    PROT 7.1 12/06/2022 04:02 PM    PROT 6.9 07/28/2022 10:57 AM    PROT 7.7 03/06/2013 07:10 AM    PROT 6.9 02/02/2013 05:00 PM     Lab Results   Component Value Date/Time    TSH 1.06 12/06/2022 04:02 PM       QUALITY MEASURES REVIEWED:  1.CAD:Patient is taking anti platelet agent:Yes  2. DYSLIPIDEMIA: Patient is on cholesterol lowering medication:Yes   3. Beta-Blocker therapy for CAD, if prior Myocardial Infarction:Yes   4. Counselled regarding smoking cessation. No   Patient does not Smoke. 5.Anticoagulation therapy (for A.Fib) No   Does Not have A.Fib.  6.Discussed weight management strategies. Assessment & Plan:  Primary / Secondary prevention is the goal by aggressive risk modification, healthy and therapeutic life style changes for cardiovascular risk reduction. CORONARY ARTERY DISEASE:Yes, S/P CABG 2013   clinically stable. Patient is on optimal medical regimen ( see medication list above )  - Patient is currently  asymptomatic from CAD. - changes in  treatment:   no, on ASA, Imdur & Coreg.           - Testing ordered:  no  [facility] classification: 1  Stress test: 3/2020   Fair exercise tolerance. 7 METs work load. Physiological BP response to exercise. ETT negative for Ischemia / Arrhythmia. HTN:well controlled on current medical regimen, see list above. - changes in  treatment:   no, on Amlodipine, Lisinopril, HCTZ & COREG      CARDIOMYOPATHY: None known   CONGESTIVE HEART FAILURE: NO KNOWN HISTORY.      VHD: No significant VHD noted  ECHO 3/2022     Left ventricular function and size is normal, EF is estimated at 55-60%. Mild left ventricular hypertrophy. Grade I diastolic dysfunction. No regional wall motion abnormalities were detected. Mildly dilated left atrium. Mild mitral , tricuspid and pulmonic regurgitation is present. RVSP is 23 mmHg. No evidence of pericardial effusion.   DYSLIPIDEMIA: Patient's profile is at / near Goal.yes,                                 HDL is low                                Tolerating current medical regimen well yes, takes Lipitor See most recent Lab values in Labs section above. Diabetes mellitis: .yes,                                      Managed by family MD                                     BS under good control yes,                                      Hgb A1c avilable yes,   CAROTID ARTERY DISEASE: yes, mild               No symptoms described pertaining to Carotid artery disease               Up to date on non invasive testing .yes,                Patient is on Guidelines recommended therapy. yes,       ARRHYTHMIAS: None known     TESTS ORDERED: None this visit    TESTS ORDERED:     PREVIOUSLY ORDERED TESTS REVIEWED & DISCUSSED WITH THE PATIENT:     I personally reviewed & interpreted, all previously ordered tests as copied above. Latest Labs are pulled in to the note with dates. Labs, specially in Reference to Lipid profile, Cardiac testing in the form of Echo ( dated: ), stress tests ( dated: ) & other relevant cardiac testing reviewed with patient & recommendations made based on assessment of the results. Discussed role of Cardiac risk factors & effects + treatment of co morbidities with patient & advised accordingly. MEDICATIONS: List of medications patient is currently taking is reviewed in detail with the patient. Discussed any side effects or problems taking the medication. Recommend Continue present management & medications as listed. AFFIRMATION: I spent at least 20 minutes of time reviewing patient's history, previous & current medical problems & all Labs + testing. This includes chart prep even prior to the vosit. Various goals are discussed and multiple questions answered. Relevant concelling performed. Office follow up in six months.

## 2023-03-16 NOTE — LETTER
March 16, 2023      Alton Britt, APRN - NP  [address] [id number]      Patient: Tomasz Lance   MR Number: [id number]   YOB: 1955   Date of Visit: 3/16/2023       Dear Alton Britt: Thank you for referring Bing Lou to me for evaluation/treatment. Below are the relevant portions of my assessment and plan of care. If you have questions, please do not hesitate to call me. I look forward to following Padilla Reed along with you.     Sincerely,        Swathi Charles MD

## 2023-03-16 NOTE — PATIENT INSTRUCTIONS
CORONARY ARTERY DISEASE:Yes, S/P CABG 2013   clinically stable. Patient is on optimal medical regimen ( see medication list above )  - Patient is currently  asymptomatic from CAD. - changes in  treatment:   no, on ASA, Imdur & Coreg.           - Testing ordered:  no  [facility] classification: 1  Stress test: 3/2020   Fair exercise tolerance. 7 METs work load. Physiological BP response to exercise. ETT negative for Ischemia / Arrhythmia. HTN:well controlled on current medical regimen, see list above. - changes in  treatment:   no, on Amlodipine, Lisinopril, HCTZ & COREG      CARDIOMYOPATHY: None known   CONGESTIVE HEART FAILURE: NO KNOWN HISTORY.      VHD: No significant VHD noted  ECHO 3/2022     Left ventricular function and size is normal, EF is estimated at 55-60%. Mild left ventricular hypertrophy. Grade I diastolic dysfunction. No regional wall motion abnormalities were detected. Mildly dilated left atrium. Mild mitral , tricuspid and pulmonic regurgitation is present. RVSP is 23 mmHg. No evidence of pericardial effusion. DYSLIPIDEMIA: Patient's profile is at / near Goal.yes,                                 HDL is low                                Tolerating current medical regimen well yes, takes Lipitor                                                              See most recent Lab values in Labs section above. Diabetes mellitis: .yes,                                      Managed by family MD                                     BS under good control yes,                                      Hgb A1c avilable yes,   CAROTID ARTERY DISEASE: yes, mild               No symptoms described pertaining to Carotid artery disease               Up to date on non invasive testing .yes,                Patient is on Guidelines recommended therapy. yes,       ARRHYTHMIAS: None known     TESTS ORDERED: None this visit    TESTS ORDERED:     PREVIOUSLY ORDERED TESTS REVIEWED & DISCUSSED WITH THE PATIENT:     I personally reviewed & interpreted, all previously ordered tests as copied above. Latest Labs are pulled in to the note with dates. Labs, specially in Reference to Lipid profile, Cardiac testing in the form of Echo ( dated: ), stress tests ( dated: ) & other relevant cardiac testing reviewed with patient & recommendations made based on assessment of the results. Discussed role of Cardiac risk factors & effects + treatment of co morbidities with patient & advised accordingly. MEDICATIONS: List of medications patient is currently taking is reviewed in detail with the patient. Discussed any side effects or problems taking the medication. Recommend Continue present management & medications as listed. AFFIRMATION: I spent at least 20 minutes of time reviewing patient's history, previous & current medical problems & all Labs + testing. This includes chart prep even prior to the vosit. Various goals are discussed and multiple questions answered. Relevant concelling performed. Office follow up in six months. 3

## 2023-05-31 ENCOUNTER — TRANSCRIBE ORDERS (OUTPATIENT)
Dept: ADMINISTRATIVE | Age: 68
End: 2023-05-31

## 2023-05-31 DIAGNOSIS — R10.10 UPPER ABDOMINAL PAIN, UNSPECIFIED: Primary | ICD-10-CM

## 2023-06-01 ENCOUNTER — HOSPITAL ENCOUNTER (OUTPATIENT)
Dept: CT IMAGING | Age: 68
Discharge: HOME | End: 2023-06-01
Payer: COMMERCIAL

## 2023-06-01 DIAGNOSIS — R10.10 UPPER ABDOMINAL PAIN, UNSPECIFIED: ICD-10-CM

## 2023-06-01 PROCEDURE — 6360000004 HC RX CONTRAST MEDICATION: Performed by: STUDENT IN AN ORGANIZED HEALTH CARE EDUCATION/TRAINING PROGRAM

## 2023-06-01 PROCEDURE — 74160 CT ABDOMEN W/CONTRAST: CPT

## 2023-06-01 RX ADMIN — IOPAMIDOL 75 ML: 755 INJECTION, SOLUTION INTRAVENOUS at 11:44

## 2023-06-19 ENCOUNTER — HOSPITAL ENCOUNTER (EMERGENCY)
Age: 68
Discharge: HOME | End: 2023-06-19
Attending: EMERGENCY MEDICINE
Payer: COMMERCIAL

## 2023-06-19 ENCOUNTER — APPOINTMENT (OUTPATIENT)
Dept: CT IMAGING | Age: 68
End: 2023-06-19
Payer: COMMERCIAL

## 2023-06-19 VITALS
WEIGHT: 203 LBS | BODY MASS INDEX: 32.62 KG/M2 | TEMPERATURE: 98.4 F | OXYGEN SATURATION: 96 % | RESPIRATION RATE: 16 BRPM | HEART RATE: 87 BPM | HEIGHT: 66 IN | SYSTOLIC BLOOD PRESSURE: 110 MMHG | DIASTOLIC BLOOD PRESSURE: 74 MMHG

## 2023-06-19 DIAGNOSIS — R10.9 LEFT FLANK PAIN: Primary | ICD-10-CM

## 2023-06-19 DIAGNOSIS — M54.32 SCIATICA OF LEFT SIDE: ICD-10-CM

## 2023-06-19 LAB
ALBUMIN SERPL-MCNC: 4 GM/DL (ref 3.4–5)
ALP BLD-CCNC: 63 IU/L (ref 40–129)
ALT SERPL-CCNC: 14 U/L (ref 10–40)
ANION GAP SERPL CALCULATED.3IONS-SCNC: 12 MMOL/L (ref 4–16)
AST SERPL-CCNC: 16 IU/L (ref 15–37)
BASOPHILS ABSOLUTE: 0 K/CU MM
BASOPHILS RELATIVE PERCENT: 0.3 % (ref 0–1)
BILIRUB SERPL-MCNC: 0.5 MG/DL (ref 0–1)
BILIRUBIN URINE: NEGATIVE MG/DL
BLOOD, URINE: NEGATIVE
BUN SERPL-MCNC: 19 MG/DL (ref 6–23)
CALCIUM SERPL-MCNC: 9 MG/DL (ref 8.3–10.6)
CHLORIDE BLD-SCNC: 100 MMOL/L (ref 99–110)
CLARITY: CLEAR
CO2: 22 MMOL/L (ref 21–32)
COLOR: YELLOW
COMMENT UA: ABNORMAL
CREAT SERPL-MCNC: 1.1 MG/DL (ref 0.9–1.3)
DIFFERENTIAL TYPE: ABNORMAL
EOSINOPHILS ABSOLUTE: 0.1 K/CU MM
EOSINOPHILS RELATIVE PERCENT: 1.4 % (ref 0–3)
GFR SERPL CREATININE-BSD FRML MDRD: >60 ML/MIN/1.73M2
GLUCOSE SERPL-MCNC: 113 MG/DL (ref 70–99)
GLUCOSE, URINE: NEGATIVE MG/DL
HCT VFR BLD CALC: 41.9 % (ref 42–52)
HEMOGLOBIN: 13.7 GM/DL (ref 13.5–18)
IMMATURE NEUTROPHIL %: 0.4 % (ref 0–0.43)
KETONES, URINE: ABNORMAL MG/DL
LEUKOCYTE ESTERASE, URINE: NEGATIVE
LYMPHOCYTES ABSOLUTE: 1.5 K/CU MM
LYMPHOCYTES RELATIVE PERCENT: 19.7 % (ref 24–44)
MCH RBC QN AUTO: 31.3 PG (ref 27–31)
MCHC RBC AUTO-ENTMCNC: 32.7 % (ref 32–36)
MCV RBC AUTO: 95.7 FL (ref 78–100)
MONOCYTES ABSOLUTE: 0.7 K/CU MM
MONOCYTES RELATIVE PERCENT: 9.4 % (ref 0–4)
NITRITE URINE, QUANTITATIVE: NEGATIVE
NUCLEATED RBC %: 0 %
PDW BLD-RTO: 11.9 % (ref 11.7–14.9)
PH, URINE: 5 (ref 5–8)
PLATELET # BLD: 194 K/CU MM (ref 140–440)
PMV BLD AUTO: 11.4 FL (ref 7.5–11.1)
POTASSIUM SERPL-SCNC: 4.4 MMOL/L (ref 3.5–5.1)
PROTEIN UA: NEGATIVE MG/DL
RBC # BLD: 4.38 M/CU MM (ref 4.6–6.2)
SEGMENTED NEUTROPHILS ABSOLUTE COUNT: 5.3 K/CU MM
SEGMENTED NEUTROPHILS RELATIVE PERCENT: 68.8 % (ref 36–66)
SODIUM BLD-SCNC: 134 MMOL/L (ref 135–145)
SPECIFIC GRAVITY UA: 1.02 (ref 1–1.03)
TOTAL IMMATURE NEUTOROPHIL: 0.03 K/CU MM
TOTAL NUCLEATED RBC: 0 K/CU MM
TOTAL PROTEIN: 6.5 GM/DL (ref 6.4–8.2)
UROBILINOGEN, URINE: 0.2 MG/DL (ref 0.2–1)
WBC # BLD: 7.7 K/CU MM (ref 4–10.5)

## 2023-06-19 PROCEDURE — 80053 COMPREHEN METABOLIC PANEL: CPT

## 2023-06-19 PROCEDURE — 6370000000 HC RX 637 (ALT 250 FOR IP): Performed by: EMERGENCY MEDICINE

## 2023-06-19 PROCEDURE — 6360000002 HC RX W HCPCS: Performed by: EMERGENCY MEDICINE

## 2023-06-19 PROCEDURE — 96372 THER/PROPH/DIAG INJ SC/IM: CPT

## 2023-06-19 PROCEDURE — 99284 EMERGENCY DEPT VISIT MOD MDM: CPT

## 2023-06-19 PROCEDURE — 81003 URINALYSIS AUTO W/O SCOPE: CPT

## 2023-06-19 PROCEDURE — 85025 COMPLETE CBC W/AUTO DIFF WBC: CPT

## 2023-06-19 PROCEDURE — 74176 CT ABD & PELVIS W/O CONTRAST: CPT

## 2023-06-19 RX ORDER — LIDOCAINE 50 MG/G
1 PATCH TOPICAL DAILY
Qty: 10 PATCH | Refills: 0 | Status: SHIPPED | OUTPATIENT
Start: 2023-06-19 | End: 2023-06-29

## 2023-06-19 RX ORDER — PREDNISONE 50 MG/1
50 TABLET ORAL DAILY
Qty: 5 TABLET | Refills: 0 | Status: SHIPPED | OUTPATIENT
Start: 2023-06-19 | End: 2023-06-24

## 2023-06-19 RX ORDER — OXYCODONE HYDROCHLORIDE 5 MG/1
5 TABLET ORAL EVERY 6 HOURS PRN
Qty: 12 TABLET | Refills: 0 | Status: SHIPPED | OUTPATIENT
Start: 2023-06-19 | End: 2023-06-19 | Stop reason: SDUPTHER

## 2023-06-19 RX ORDER — TIZANIDINE 4 MG/1
4 TABLET ORAL 3 TIMES DAILY PRN
Qty: 30 TABLET | Refills: 0 | Status: SHIPPED | OUTPATIENT
Start: 2023-06-19

## 2023-06-19 RX ORDER — LIDOCAINE 4 G/G
1 PATCH TOPICAL DAILY
Status: DISCONTINUED | OUTPATIENT
Start: 2023-06-19 | End: 2023-06-19 | Stop reason: HOSPADM

## 2023-06-19 RX ORDER — ACETAMINOPHEN 500 MG
1000 TABLET ORAL 3 TIMES DAILY
Qty: 60 TABLET | Refills: 0 | Status: SHIPPED | OUTPATIENT
Start: 2023-06-19 | End: 2023-06-29

## 2023-06-19 RX ORDER — METHOCARBAMOL 500 MG/1
500 TABLET, FILM COATED ORAL 4 TIMES DAILY
Qty: 40 TABLET | Refills: 0 | Status: SHIPPED | OUTPATIENT
Start: 2023-06-19 | End: 2023-06-19

## 2023-06-19 RX ORDER — ACETAMINOPHEN 500 MG
1000 TABLET ORAL 3 TIMES DAILY
Qty: 60 TABLET | Refills: 0 | Status: SHIPPED | OUTPATIENT
Start: 2023-06-19 | End: 2023-06-19 | Stop reason: SDUPTHER

## 2023-06-19 RX ORDER — OXYCODONE HYDROCHLORIDE 5 MG/1
5 TABLET ORAL EVERY 6 HOURS PRN
Qty: 12 TABLET | Refills: 0 | Status: SHIPPED | OUTPATIENT
Start: 2023-06-19 | End: 2023-06-22

## 2023-06-19 RX ORDER — OXYCODONE HYDROCHLORIDE AND ACETAMINOPHEN 5; 325 MG/1; MG/1
1 TABLET ORAL ONCE
Status: COMPLETED | OUTPATIENT
Start: 2023-06-19 | End: 2023-06-19

## 2023-06-19 RX ORDER — NAPROXEN 500 MG/1
500 TABLET ORAL 2 TIMES DAILY WITH MEALS
Qty: 20 TABLET | Refills: 0 | Status: SHIPPED | OUTPATIENT
Start: 2023-06-19 | End: 2023-06-29

## 2023-06-19 RX ORDER — LIDOCAINE 50 MG/G
1 PATCH TOPICAL DAILY
Qty: 10 PATCH | Refills: 0 | Status: SHIPPED | OUTPATIENT
Start: 2023-06-19 | End: 2023-06-19 | Stop reason: SDUPTHER

## 2023-06-19 RX ORDER — PREDNISONE 50 MG/1
50 TABLET ORAL DAILY
Qty: 5 TABLET | Refills: 0 | Status: SHIPPED | OUTPATIENT
Start: 2023-06-19 | End: 2023-06-19 | Stop reason: SDUPTHER

## 2023-06-19 RX ORDER — NAPROXEN 500 MG/1
500 TABLET ORAL 2 TIMES DAILY WITH MEALS
Qty: 20 TABLET | Refills: 0 | Status: SHIPPED | OUTPATIENT
Start: 2023-06-19 | End: 2023-06-19 | Stop reason: SDUPTHER

## 2023-06-19 RX ORDER — METHOCARBAMOL 500 MG/1
500 TABLET, FILM COATED ORAL 4 TIMES DAILY
Qty: 40 TABLET | Refills: 0 | Status: SHIPPED | OUTPATIENT
Start: 2023-06-19 | End: 2023-06-19 | Stop reason: SDUPTHER

## 2023-06-19 RX ORDER — NAPROXEN 250 MG/1
500 TABLET ORAL ONCE
Status: COMPLETED | OUTPATIENT
Start: 2023-06-19 | End: 2023-06-19

## 2023-06-19 RX ADMIN — NAPROXEN 500 MG: 250 TABLET ORAL at 16:24

## 2023-06-19 RX ADMIN — HYDROMORPHONE HYDROCHLORIDE 1 MG: 1 INJECTION, SOLUTION INTRAMUSCULAR; INTRAVENOUS; SUBCUTANEOUS at 16:26

## 2023-06-19 RX ADMIN — OXYCODONE HYDROCHLORIDE AND ACETAMINOPHEN 1 TABLET: 5; 325 TABLET ORAL at 19:52

## 2023-06-19 ASSESSMENT — PAIN DESCRIPTION - DESCRIPTORS: DESCRIPTORS: ACHING

## 2023-06-19 ASSESSMENT — PAIN DESCRIPTION - ONSET: ONSET: ON-GOING

## 2023-06-19 ASSESSMENT — PAIN DESCRIPTION - PAIN TYPE: TYPE: ACUTE PAIN

## 2023-06-19 ASSESSMENT — PAIN DESCRIPTION - FREQUENCY: FREQUENCY: CONTINUOUS

## 2023-06-19 ASSESSMENT — PAIN DESCRIPTION - ORIENTATION: ORIENTATION: LEFT

## 2023-06-19 ASSESSMENT — PAIN DESCRIPTION - LOCATION
LOCATION: LEG;BACK
LOCATION: FLANK

## 2023-06-19 ASSESSMENT — PAIN SCALES - GENERAL
PAINLEVEL_OUTOF10: 7
PAINLEVEL_OUTOF10: 8
PAINLEVEL_OUTOF10: 7

## 2023-06-19 ASSESSMENT — PAIN - FUNCTIONAL ASSESSMENT: PAIN_FUNCTIONAL_ASSESSMENT: 0-10

## 2023-06-19 NOTE — ED PROVIDER NOTES
administration of intravenous contrast. Multiplanar reformatted images are provided for review. Automated exposure control, iterative reconstruction, and/or weight based adjustment of the mA/kV was utilized to reduce the radiation dose to as low as reasonably achievable. COMPARISON: CT abdomen and pelvis 11/12/2014. HISTORY: ORDERING SYSTEM PROVIDED HISTORY:  Upper abdominal pain, unspecified. TECHNOLOGIST PROVIDED HISTORY: Additional Contrast?  None STAT Creatinine as needed:  No Reason for Exam:  Upper abdominal pain, unspecified. FINDINGS: Lower Chest: Visualized lung fields appear clear. No pleural effusions. Heart is normal in size. No pericardial effusion. Organs: Unremarkable liver, spleen, pancreas, and gallbladder. Bilateral adrenal thickening, right somewhat more than left with possible superimposed right renal nodule. Findings appear similar since 11/12/2014 and given long-term stability are compatible with benign etiology. Punctate nonobstructing stones to the upper pole of the right kidney and to the lower pole of the left kidney. Left renal cyst with possibly some thin internal septations measuring 3.5 x 2.6 cm (image 69, axial sequence, series 2). This has enlarged since prior remote exam. GI/Bowel: No evidence for bowel obstruction or definite bowel wall thickening to visualized portions of unopacified large and small bowel. Appendix is partially included in the field of view and visualized portions appear unremarkable. Stomach appears unremarkable. Peritoneum/Retroperitoneum: No ascites or focal fluid collections. No intraperitoneal free air. No evidence for abdominal aortic aneurysm. No lymphadenopathy. Severe atherosclerotic vascular calcifications. Bones/Soft Tissues: No acute bone or soft tissue abnormality. No suspicious focal bony lesions. No acute findings to the abdomen or pelvis.  Left renal cyst with possibly some thin internal septations, increased in size from remote exam

## 2023-07-05 ENCOUNTER — HOSPITAL ENCOUNTER (OUTPATIENT)
Dept: MRI IMAGING | Age: 68
Discharge: HOME | End: 2023-07-05
Payer: COMMERCIAL

## 2023-07-05 DIAGNOSIS — N28.1 ACQUIRED CYST OF KIDNEY: ICD-10-CM

## 2023-07-05 LAB
EGFR, POC: 59 ML/MIN/1.73M2
POC CREATININE: 1.3 MG/DL (ref 0.9–1.3)

## 2023-07-05 PROCEDURE — 6360000004 HC RX CONTRAST MEDICATION: Performed by: NURSE PRACTITIONER

## 2023-07-05 PROCEDURE — 74183 MRI ABD W/O CNTR FLWD CNTR: CPT

## 2023-07-05 PROCEDURE — A9577 INJ MULTIHANCE: HCPCS | Performed by: NURSE PRACTITIONER

## 2023-07-05 PROCEDURE — 82565 ASSAY OF CREATININE: CPT

## 2023-07-05 RX ADMIN — GADOBENATE DIMEGLUMINE 19 ML: 529 INJECTION, SOLUTION INTRAVENOUS at 14:20

## 2023-07-23 ENCOUNTER — APPOINTMENT (OUTPATIENT)
Dept: CT IMAGING | Age: 68
End: 2023-07-23
Payer: COMMERCIAL

## 2023-07-23 ENCOUNTER — HOSPITAL ENCOUNTER (OUTPATIENT)
Age: 68
Setting detail: OBSERVATION
Discharge: HOME | End: 2023-07-24
Attending: EMERGENCY MEDICINE | Admitting: STUDENT IN AN ORGANIZED HEALTH CARE EDUCATION/TRAINING PROGRAM
Payer: COMMERCIAL

## 2023-07-23 ENCOUNTER — APPOINTMENT (OUTPATIENT)
Dept: GENERAL RADIOLOGY | Age: 68
End: 2023-07-23
Payer: COMMERCIAL

## 2023-07-23 DIAGNOSIS — R27.0 ATAXIA: ICD-10-CM

## 2023-07-23 DIAGNOSIS — E16.2 HYPOGLYCEMIA: Primary | ICD-10-CM

## 2023-07-23 LAB
ACETAMINOPHEN LEVEL: <5 UG/ML (ref 15–30)
ALBUMIN SERPL-MCNC: 3.7 GM/DL (ref 3.4–5)
ALCOHOL SCREEN SERUM: <0.01 %WT/VOL
ALP BLD-CCNC: 65 IU/L (ref 40–128)
ALT SERPL-CCNC: 13 U/L (ref 10–40)
ANION GAP SERPL CALCULATED.3IONS-SCNC: 14 MMOL/L (ref 4–16)
AST SERPL-CCNC: 19 IU/L (ref 15–37)
BASOPHILS ABSOLUTE: 0 K/CU MM
BASOPHILS RELATIVE PERCENT: 0.3 % (ref 0–1)
BILIRUB SERPL-MCNC: 0.3 MG/DL (ref 0–1)
BUN SERPL-MCNC: 20 MG/DL (ref 6–23)
CALCIUM SERPL-MCNC: 9 MG/DL (ref 8.3–10.6)
CHLORIDE BLD-SCNC: 102 MMOL/L (ref 99–110)
CHP ED QC CHECK: 93
CO2: 25 MMOL/L (ref 21–32)
CREAT SERPL-MCNC: 1.1 MG/DL (ref 0.9–1.3)
DIFFERENTIAL TYPE: ABNORMAL
DOSE AMOUNT: ABNORMAL
DOSE AMOUNT: ABNORMAL
DOSE TIME: ABNORMAL
DOSE TIME: ABNORMAL
EOSINOPHILS ABSOLUTE: 0 K/CU MM
EOSINOPHILS RELATIVE PERCENT: 0.3 % (ref 0–3)
GFR SERPL CREATININE-BSD FRML MDRD: >60 ML/MIN/1.73M2
GLUCOSE BLD-MCNC: 85 MG/DL (ref 70–99)
GLUCOSE BLD-MCNC: 93 MG/DL (ref 70–99)
GLUCOSE SERPL-MCNC: 60 MG/DL (ref 70–99)
HCT VFR BLD CALC: 45.8 % (ref 42–52)
HEMOGLOBIN: 14.6 GM/DL (ref 13.5–18)
IMMATURE NEUTROPHIL %: 1 % (ref 0–0.43)
INR BLD: 0.9 INDEX
LYMPHOCYTES ABSOLUTE: 1.2 K/CU MM
LYMPHOCYTES RELATIVE PERCENT: 7.5 % (ref 24–44)
MCH RBC QN AUTO: 30.9 PG (ref 27–31)
MCHC RBC AUTO-ENTMCNC: 31.9 % (ref 32–36)
MCV RBC AUTO: 97 FL (ref 78–100)
MONOCYTES ABSOLUTE: 0.9 K/CU MM
MONOCYTES RELATIVE PERCENT: 5.7 % (ref 0–4)
NUCLEATED RBC %: 0 %
PDW BLD-RTO: 12 % (ref 11.7–14.9)
PLATELET # BLD: 274 K/CU MM (ref 140–440)
PMV BLD AUTO: 10.7 FL (ref 7.5–11.1)
POTASSIUM SERPL-SCNC: 3.5 MMOL/L (ref 3.5–5.1)
PROTHROMBIN TIME: 12.1 SECONDS (ref 11.7–14.5)
RBC # BLD: 4.72 M/CU MM (ref 4.6–6.2)
REASON FOR REJECTION: NORMAL
REJECTED TEST: NORMAL
SALICYLATE LEVEL: 0.4 MG/DL (ref 15–30)
SEGMENTED NEUTROPHILS ABSOLUTE COUNT: 13.3 K/CU MM
SEGMENTED NEUTROPHILS RELATIVE PERCENT: 85.2 % (ref 36–66)
SODIUM BLD-SCNC: 141 MMOL/L (ref 135–145)
TOTAL IMMATURE NEUTOROPHIL: 0.16 K/CU MM
TOTAL NUCLEATED RBC: 0 K/CU MM
TOTAL PROTEIN: 6.4 GM/DL (ref 6.4–8.2)
WBC # BLD: 15.5 K/CU MM (ref 4–10.5)

## 2023-07-23 PROCEDURE — 70498 CT ANGIOGRAPHY NECK: CPT

## 2023-07-23 PROCEDURE — 82962 GLUCOSE BLOOD TEST: CPT

## 2023-07-23 PROCEDURE — 93005 ELECTROCARDIOGRAM TRACING: CPT | Performed by: EMERGENCY MEDICINE

## 2023-07-23 PROCEDURE — 70450 CT HEAD/BRAIN W/O DYE: CPT

## 2023-07-23 PROCEDURE — 96374 THER/PROPH/DIAG INJ IV PUSH: CPT

## 2023-07-23 PROCEDURE — 80053 COMPREHEN METABOLIC PANEL: CPT

## 2023-07-23 PROCEDURE — 85610 PROTHROMBIN TIME: CPT

## 2023-07-23 PROCEDURE — 85025 COMPLETE CBC W/AUTO DIFF WBC: CPT

## 2023-07-23 PROCEDURE — 6360000004 HC RX CONTRAST MEDICATION: Performed by: EMERGENCY MEDICINE

## 2023-07-23 PROCEDURE — 83735 ASSAY OF MAGNESIUM: CPT

## 2023-07-23 PROCEDURE — 99285 EMERGENCY DEPT VISIT HI MDM: CPT

## 2023-07-23 PROCEDURE — 6360000002 HC RX W HCPCS: Performed by: EMERGENCY MEDICINE

## 2023-07-23 PROCEDURE — 84484 ASSAY OF TROPONIN QUANT: CPT

## 2023-07-23 PROCEDURE — G0480 DRUG TEST DEF 1-7 CLASSES: HCPCS

## 2023-07-23 PROCEDURE — 71045 X-RAY EXAM CHEST 1 VIEW: CPT

## 2023-07-23 RX ORDER — ONDANSETRON 2 MG/ML
4 INJECTION INTRAMUSCULAR; INTRAVENOUS EVERY 6 HOURS PRN
Status: DISCONTINUED | OUTPATIENT
Start: 2023-07-23 | End: 2023-07-24 | Stop reason: SDUPTHER

## 2023-07-23 RX ADMIN — ONDANSETRON 4 MG: 2 INJECTION INTRAMUSCULAR; INTRAVENOUS at 22:45

## 2023-07-23 RX ADMIN — IOPAMIDOL 75 ML: 755 INJECTION, SOLUTION INTRAVENOUS at 22:39

## 2023-07-23 ASSESSMENT — PAIN - FUNCTIONAL ASSESSMENT: PAIN_FUNCTIONAL_ASSESSMENT: NONE - DENIES PAIN

## 2023-07-24 ENCOUNTER — APPOINTMENT (OUTPATIENT)
Dept: MRI IMAGING | Age: 68
End: 2023-07-24
Payer: COMMERCIAL

## 2023-07-24 VITALS
RESPIRATION RATE: 18 BRPM | DIASTOLIC BLOOD PRESSURE: 71 MMHG | HEIGHT: 66 IN | SYSTOLIC BLOOD PRESSURE: 139 MMHG | OXYGEN SATURATION: 93 % | WEIGHT: 173.25 LBS | BODY MASS INDEX: 27.84 KG/M2 | HEART RATE: 85 BPM | TEMPERATURE: 98.2 F

## 2023-07-24 PROBLEM — E16.2 HYPOGLYCEMIA: Status: ACTIVE | Noted: 2023-07-24

## 2023-07-24 LAB
ALBUMIN SERPL-MCNC: 3.7 GM/DL (ref 3.4–5)
ALP BLD-CCNC: 59 IU/L (ref 40–128)
ALT SERPL-CCNC: 13 U/L (ref 10–40)
ANION GAP SERPL CALCULATED.3IONS-SCNC: 10 MMOL/L (ref 4–16)
AST SERPL-CCNC: 26 IU/L (ref 15–37)
BASOPHILS ABSOLUTE: 0 K/CU MM
BASOPHILS RELATIVE PERCENT: 0.2 % (ref 0–1)
BILIRUB SERPL-MCNC: 0.3 MG/DL (ref 0–1)
BILIRUBIN URINE: NEGATIVE MG/DL
BLOOD, URINE: NEGATIVE
BUN SERPL-MCNC: 16 MG/DL (ref 6–23)
CALCIUM SERPL-MCNC: 9 MG/DL (ref 8.3–10.6)
CHLORIDE BLD-SCNC: 98 MMOL/L (ref 99–110)
CLARITY: CLEAR
CO2: 30 MMOL/L (ref 21–32)
COLOR: YELLOW
COMMENT UA: ABNORMAL
CREAT SERPL-MCNC: 1 MG/DL (ref 0.9–1.3)
DIFFERENTIAL TYPE: ABNORMAL
EKG ATRIAL RATE: 102 BPM
EKG DIAGNOSIS: NORMAL
EKG P AXIS: 55 DEGREES
EKG P-R INTERVAL: 150 MS
EKG Q-T INTERVAL: 344 MS
EKG QRS DURATION: 102 MS
EKG QTC CALCULATION (BAZETT): 448 MS
EKG R AXIS: -50 DEGREES
EKG T AXIS: 16 DEGREES
EKG VENTRICULAR RATE: 102 BPM
EOSINOPHILS ABSOLUTE: 0 K/CU MM
EOSINOPHILS RELATIVE PERCENT: 0 % (ref 0–3)
ESTIMATED AVERAGE GLUCOSE: 128 MG/DL
GFR SERPL CREATININE-BSD FRML MDRD: >60 ML/MIN/1.73M2
GLUCOSE BLD-MCNC: 160 MG/DL (ref 70–99)
GLUCOSE BLD-MCNC: 204 MG/DL (ref 70–99)
GLUCOSE BLD-MCNC: 239 MG/DL (ref 70–99)
GLUCOSE SERPL-MCNC: 216 MG/DL (ref 70–99)
GLUCOSE, URINE: NEGATIVE MG/DL
HBA1C MFR BLD: 6.1 % (ref 4.2–6.3)
HCT VFR BLD CALC: 39.3 % (ref 42–52)
HEMOGLOBIN: 12.7 GM/DL (ref 13.5–18)
IMMATURE NEUTROPHIL %: 0.6 % (ref 0–0.43)
INR BLD: 1 INDEX
KETONES, URINE: 15 MG/DL
LEUKOCYTE ESTERASE, URINE: NEGATIVE
LYMPHOCYTES ABSOLUTE: 1.9 K/CU MM
LYMPHOCYTES RELATIVE PERCENT: 15.3 % (ref 24–44)
MAGNESIUM: 1.4 MG/DL (ref 1.8–2.4)
MAGNESIUM: 1.8 MG/DL (ref 1.8–2.4)
MCH RBC QN AUTO: 30.8 PG (ref 27–31)
MCHC RBC AUTO-ENTMCNC: 32.3 % (ref 32–36)
MCV RBC AUTO: 95.4 FL (ref 78–100)
MONOCYTES ABSOLUTE: 1.4 K/CU MM
MONOCYTES RELATIVE PERCENT: 10.7 % (ref 0–4)
NITRITE URINE, QUANTITATIVE: NEGATIVE
NUCLEATED RBC %: 0 %
PDW BLD-RTO: 12 % (ref 11.7–14.9)
PH, URINE: 6 (ref 5–8)
PLATELET # BLD: 229 K/CU MM (ref 140–440)
PMV BLD AUTO: 10.7 FL (ref 7.5–11.1)
POTASSIUM SERPL-SCNC: 3.9 MMOL/L (ref 3.5–5.1)
PROTEIN UA: NEGATIVE MG/DL
PROTHROMBIN TIME: 13.2 SECONDS (ref 11.7–14.5)
RBC # BLD: 4.12 M/CU MM (ref 4.6–6.2)
SEGMENTED NEUTROPHILS ABSOLUTE COUNT: 9.3 K/CU MM
SEGMENTED NEUTROPHILS RELATIVE PERCENT: 73.2 % (ref 36–66)
SODIUM BLD-SCNC: 138 MMOL/L (ref 135–145)
SPECIFIC GRAVITY UA: 1.01 (ref 1–1.03)
TOTAL IMMATURE NEUTOROPHIL: 0.07 K/CU MM
TOTAL NUCLEATED RBC: 0 K/CU MM
TOTAL PROTEIN: 5.8 GM/DL (ref 6.4–8.2)
TROPONIN T: <0.01 NG/ML
UROBILINOGEN, URINE: 0.2 MG/DL (ref 0.2–1)
WBC # BLD: 12.6 K/CU MM (ref 4–10.5)

## 2023-07-24 PROCEDURE — 83036 HEMOGLOBIN GLYCOSYLATED A1C: CPT

## 2023-07-24 PROCEDURE — 6370000000 HC RX 637 (ALT 250 FOR IP): Performed by: STUDENT IN AN ORGANIZED HEALTH CARE EDUCATION/TRAINING PROGRAM

## 2023-07-24 PROCEDURE — 96361 HYDRATE IV INFUSION ADD-ON: CPT

## 2023-07-24 PROCEDURE — 2580000003 HC RX 258: Performed by: PHYSICIAN ASSISTANT

## 2023-07-24 PROCEDURE — 81003 URINALYSIS AUTO W/O SCOPE: CPT

## 2023-07-24 PROCEDURE — 6370000000 HC RX 637 (ALT 250 FOR IP): Performed by: FAMILY MEDICINE

## 2023-07-24 PROCEDURE — 70551 MRI BRAIN STEM W/O DYE: CPT

## 2023-07-24 PROCEDURE — 85025 COMPLETE CBC W/AUTO DIFF WBC: CPT

## 2023-07-24 PROCEDURE — 83735 ASSAY OF MAGNESIUM: CPT

## 2023-07-24 PROCEDURE — 99205 OFFICE O/P NEW HI 60 MIN: CPT | Performed by: PSYCHIATRY & NEUROLOGY

## 2023-07-24 PROCEDURE — G0378 HOSPITAL OBSERVATION PER HR: HCPCS

## 2023-07-24 PROCEDURE — 80053 COMPREHEN METABOLIC PANEL: CPT

## 2023-07-24 PROCEDURE — 2580000003 HC RX 258: Performed by: STUDENT IN AN ORGANIZED HEALTH CARE EDUCATION/TRAINING PROGRAM

## 2023-07-24 PROCEDURE — 82962 GLUCOSE BLOOD TEST: CPT

## 2023-07-24 PROCEDURE — 6370000000 HC RX 637 (ALT 250 FOR IP): Performed by: EMERGENCY MEDICINE

## 2023-07-24 PROCEDURE — 93010 ELECTROCARDIOGRAM REPORT: CPT | Performed by: INTERNAL MEDICINE

## 2023-07-24 PROCEDURE — 94761 N-INVAS EAR/PLS OXIMETRY MLT: CPT

## 2023-07-24 PROCEDURE — 85610 PROTHROMBIN TIME: CPT

## 2023-07-24 PROCEDURE — 6360000002 HC RX W HCPCS: Performed by: EMERGENCY MEDICINE

## 2023-07-24 PROCEDURE — 36415 COLL VENOUS BLD VENIPUNCTURE: CPT

## 2023-07-24 PROCEDURE — 96366 THER/PROPH/DIAG IV INF ADDON: CPT

## 2023-07-24 PROCEDURE — 6370000000 HC RX 637 (ALT 250 FOR IP): Performed by: PHYSICIAN ASSISTANT

## 2023-07-24 PROCEDURE — 96365 THER/PROPH/DIAG IV INF INIT: CPT

## 2023-07-24 RX ORDER — SODIUM CHLORIDE 9 MG/ML
INJECTION, SOLUTION INTRAVENOUS PRN
Status: DISCONTINUED | OUTPATIENT
Start: 2023-07-24 | End: 2023-07-24 | Stop reason: HOSPADM

## 2023-07-24 RX ORDER — INSULIN ASPART 100 [IU]/ML
5 INJECTION, SOLUTION INTRAVENOUS; SUBCUTANEOUS
Qty: 5 ADJUSTABLE DOSE PRE-FILLED PEN SYRINGE | Refills: 3
Start: 2023-07-24

## 2023-07-24 RX ORDER — SODIUM CHLORIDE 0.9 % (FLUSH) 0.9 %
5-40 SYRINGE (ML) INJECTION EVERY 12 HOURS SCHEDULED
Status: DISCONTINUED | OUTPATIENT
Start: 2023-07-24 | End: 2023-07-24 | Stop reason: HOSPADM

## 2023-07-24 RX ORDER — SODIUM CHLORIDE, SODIUM LACTATE, POTASSIUM CHLORIDE, AND CALCIUM CHLORIDE .6; .31; .03; .02 G/100ML; G/100ML; G/100ML; G/100ML
500 INJECTION, SOLUTION INTRAVENOUS ONCE
Status: COMPLETED | OUTPATIENT
Start: 2023-07-24 | End: 2023-07-24

## 2023-07-24 RX ORDER — POLYETHYLENE GLYCOL 3350 17 G/17G
17 POWDER, FOR SOLUTION ORAL DAILY PRN
Status: DISCONTINUED | OUTPATIENT
Start: 2023-07-24 | End: 2023-07-24 | Stop reason: HOSPADM

## 2023-07-24 RX ORDER — POTASSIUM CHLORIDE 7.45 MG/ML
10 INJECTION INTRAVENOUS PRN
Status: DISCONTINUED | OUTPATIENT
Start: 2023-07-24 | End: 2023-07-24 | Stop reason: HOSPADM

## 2023-07-24 RX ORDER — POTASSIUM CHLORIDE 20 MEQ/1
40 TABLET, EXTENDED RELEASE ORAL PRN
Status: DISCONTINUED | OUTPATIENT
Start: 2023-07-24 | End: 2023-07-24 | Stop reason: HOSPADM

## 2023-07-24 RX ORDER — ASPIRIN 81 MG/1
81 TABLET, CHEWABLE ORAL DAILY
Status: DISCONTINUED | OUTPATIENT
Start: 2023-07-24 | End: 2023-07-24 | Stop reason: HOSPADM

## 2023-07-24 RX ORDER — ACETAMINOPHEN 325 MG/1
650 TABLET ORAL ONCE
Status: COMPLETED | OUTPATIENT
Start: 2023-07-24 | End: 2023-07-24

## 2023-07-24 RX ORDER — SODIUM CHLORIDE 0.9 % (FLUSH) 0.9 %
5-40 SYRINGE (ML) INJECTION PRN
Status: DISCONTINUED | OUTPATIENT
Start: 2023-07-24 | End: 2023-07-24 | Stop reason: HOSPADM

## 2023-07-24 RX ORDER — ONDANSETRON 4 MG/1
4 TABLET, ORALLY DISINTEGRATING ORAL EVERY 8 HOURS PRN
Status: DISCONTINUED | OUTPATIENT
Start: 2023-07-24 | End: 2023-07-24 | Stop reason: HOSPADM

## 2023-07-24 RX ORDER — INSULIN GLARGINE 100 [IU]/ML
25 INJECTION, SOLUTION SUBCUTANEOUS DAILY
Status: DISCONTINUED | OUTPATIENT
Start: 2023-07-24 | End: 2023-07-24 | Stop reason: HOSPADM

## 2023-07-24 RX ORDER — INSULIN GLARGINE 100 [IU]/ML
25 INJECTION, SOLUTION SUBCUTANEOUS DAILY
Qty: 5 ADJUSTABLE DOSE PRE-FILLED PEN SYRINGE | Refills: 6
Start: 2023-07-24

## 2023-07-24 RX ORDER — INSULIN LISPRO 100 [IU]/ML
5 INJECTION, SOLUTION INTRAVENOUS; SUBCUTANEOUS
Status: DISCONTINUED | OUTPATIENT
Start: 2023-07-24 | End: 2023-07-24 | Stop reason: HOSPADM

## 2023-07-24 RX ORDER — ISOSORBIDE MONONITRATE 30 MG/1
30 TABLET, EXTENDED RELEASE ORAL 2 TIMES DAILY
Status: DISCONTINUED | OUTPATIENT
Start: 2023-07-24 | End: 2023-07-24 | Stop reason: HOSPADM

## 2023-07-24 RX ORDER — ONDANSETRON 2 MG/ML
4 INJECTION INTRAMUSCULAR; INTRAVENOUS EVERY 6 HOURS PRN
Status: DISCONTINUED | OUTPATIENT
Start: 2023-07-24 | End: 2023-07-24 | Stop reason: HOSPADM

## 2023-07-24 RX ORDER — HYDROCHLOROTHIAZIDE 25 MG/1
25 TABLET ORAL DAILY
Status: DISCONTINUED | OUTPATIENT
Start: 2023-07-24 | End: 2023-07-24 | Stop reason: HOSPADM

## 2023-07-24 RX ORDER — LANOLIN ALCOHOL/MO/W.PET/CERES
400 CREAM (GRAM) TOPICAL DAILY
Status: DISCONTINUED | OUTPATIENT
Start: 2023-07-24 | End: 2023-07-24 | Stop reason: HOSPADM

## 2023-07-24 RX ORDER — DEXTROSE MONOHYDRATE 100 MG/ML
INJECTION, SOLUTION INTRAVENOUS CONTINUOUS PRN
Status: DISCONTINUED | OUTPATIENT
Start: 2023-07-24 | End: 2023-07-24 | Stop reason: HOSPADM

## 2023-07-24 RX ORDER — AMLODIPINE BESYLATE 5 MG/1
5 TABLET ORAL DAILY
Status: DISCONTINUED | OUTPATIENT
Start: 2023-07-24 | End: 2023-07-24 | Stop reason: HOSPADM

## 2023-07-24 RX ORDER — GLUCAGON 1 MG/ML
1 KIT INJECTION PRN
Status: DISCONTINUED | OUTPATIENT
Start: 2023-07-24 | End: 2023-07-24 | Stop reason: HOSPADM

## 2023-07-24 RX ORDER — MAGNESIUM SULFATE IN WATER 40 MG/ML
2000 INJECTION, SOLUTION INTRAVENOUS ONCE
Status: COMPLETED | OUTPATIENT
Start: 2023-07-24 | End: 2023-07-24

## 2023-07-24 RX ORDER — LANOLIN ALCOHOL/MO/W.PET/CERES
400 CREAM (GRAM) TOPICAL DAILY
Qty: 30 TABLET | Refills: 2 | COMMUNITY
Start: 2023-07-25

## 2023-07-24 RX ORDER — ACETAMINOPHEN 650 MG/1
650 SUPPOSITORY RECTAL EVERY 6 HOURS PRN
Status: DISCONTINUED | OUTPATIENT
Start: 2023-07-24 | End: 2023-07-24 | Stop reason: HOSPADM

## 2023-07-24 RX ORDER — ACETAMINOPHEN 325 MG/1
650 TABLET ORAL EVERY 6 HOURS PRN
Status: DISCONTINUED | OUTPATIENT
Start: 2023-07-24 | End: 2023-07-24 | Stop reason: HOSPADM

## 2023-07-24 RX ORDER — ATORVASTATIN CALCIUM 40 MG/1
40 TABLET, FILM COATED ORAL DAILY
Status: DISCONTINUED | OUTPATIENT
Start: 2023-07-24 | End: 2023-07-24 | Stop reason: HOSPADM

## 2023-07-24 RX ORDER — ENOXAPARIN SODIUM 100 MG/ML
40 INJECTION SUBCUTANEOUS DAILY
Status: DISCONTINUED | OUTPATIENT
Start: 2023-07-24 | End: 2023-07-24 | Stop reason: HOSPADM

## 2023-07-24 RX ORDER — LISINOPRIL 20 MG/1
40 TABLET ORAL DAILY
Status: DISCONTINUED | OUTPATIENT
Start: 2023-07-24 | End: 2023-07-24 | Stop reason: HOSPADM

## 2023-07-24 RX ORDER — MAGNESIUM SULFATE IN WATER 40 MG/ML
2000 INJECTION, SOLUTION INTRAVENOUS PRN
Status: DISCONTINUED | OUTPATIENT
Start: 2023-07-24 | End: 2023-07-24 | Stop reason: HOSPADM

## 2023-07-24 RX ORDER — CARVEDILOL 25 MG/1
25 TABLET ORAL 2 TIMES DAILY WITH MEALS
Status: DISCONTINUED | OUTPATIENT
Start: 2023-07-24 | End: 2023-07-24 | Stop reason: HOSPADM

## 2023-07-24 RX ORDER — PANTOPRAZOLE SODIUM 40 MG/1
40 TABLET, DELAYED RELEASE ORAL
Status: DISCONTINUED | OUTPATIENT
Start: 2023-07-24 | End: 2023-07-24 | Stop reason: HOSPADM

## 2023-07-24 RX ADMIN — PANTOPRAZOLE SODIUM 40 MG: 40 TABLET, DELAYED RELEASE ORAL at 05:10

## 2023-07-24 RX ADMIN — Medication 400 MG: at 09:28

## 2023-07-24 RX ADMIN — INSULIN GLARGINE 25 UNITS: 100 INJECTION, SOLUTION SUBCUTANEOUS at 08:30

## 2023-07-24 RX ADMIN — MAGNESIUM SULFATE HEPTAHYDRATE 2000 MG: 40 INJECTION, SOLUTION INTRAVENOUS at 03:22

## 2023-07-24 RX ADMIN — LISINOPRIL 40 MG: 20 TABLET ORAL at 08:29

## 2023-07-24 RX ADMIN — SODIUM CHLORIDE, POTASSIUM CHLORIDE, SODIUM LACTATE AND CALCIUM CHLORIDE 500 ML: 600; 310; 30; 20 INJECTION, SOLUTION INTRAVENOUS at 11:17

## 2023-07-24 RX ADMIN — ONDANSETRON 4 MG: 4 TABLET, ORALLY DISINTEGRATING ORAL at 05:10

## 2023-07-24 RX ADMIN — HYDROCHLOROTHIAZIDE 25 MG: 25 TABLET ORAL at 08:29

## 2023-07-24 RX ADMIN — INSULIN LISPRO 5 UNITS: 100 INJECTION, SOLUTION INTRAVENOUS; SUBCUTANEOUS at 11:32

## 2023-07-24 RX ADMIN — CARVEDILOL 25 MG: 25 TABLET, FILM COATED ORAL at 08:28

## 2023-07-24 RX ADMIN — INSULIN LISPRO 5 UNITS: 100 INJECTION, SOLUTION INTRAVENOUS; SUBCUTANEOUS at 07:35

## 2023-07-24 RX ADMIN — AMLODIPINE BESYLATE 5 MG: 5 TABLET ORAL at 08:29

## 2023-07-24 RX ADMIN — ACETAMINOPHEN 650 MG: 325 TABLET ORAL at 02:07

## 2023-07-24 RX ADMIN — ASPIRIN 81 MG: 81 TABLET, CHEWABLE ORAL at 08:29

## 2023-07-24 RX ADMIN — SODIUM CHLORIDE, PRESERVATIVE FREE 10 ML: 5 INJECTION INTRAVENOUS at 08:48

## 2023-07-24 ASSESSMENT — PAIN DESCRIPTION - LOCATION
LOCATION: ABDOMEN
LOCATION: HEAD

## 2023-07-24 ASSESSMENT — PAIN DESCRIPTION - DESCRIPTORS
DESCRIPTORS: ACHING
DESCRIPTORS: ACHING

## 2023-07-24 ASSESSMENT — PAIN SCALES - GENERAL
PAINLEVEL_OUTOF10: 3
PAINLEVEL_OUTOF10: 3
PAINLEVEL_OUTOF10: 0

## 2023-07-24 ASSESSMENT — PAIN DESCRIPTION - FREQUENCY: FREQUENCY: INTERMITTENT

## 2023-07-24 ASSESSMENT — PAIN DESCRIPTION - ORIENTATION: ORIENTATION: RIGHT;LEFT;LOWER;MID;UPPER

## 2023-07-24 NOTE — CONSULTS
Neurology Service Consult Note  [address] [place] [address]   Patient Name: Kayleen Mays  : 1955        Subjective:   Reason for consult: Generalized weakness, dysarthria. 69 y.o. male with history of angina, CAD s/p CABG, DM,  MI, HLD, HTN presenting to [address] [place] [address] after waking from sleep with weakness in the bilateral lower extremities and loss of coordination in the upper extremities. He also felt he had slurred speech. EMS noted blood glucose of 55, patient was given oral glucose. On arrival to ED his symptoms were resolving. Overall symptoms persisted for 2-4 hours. He has had similar symptoms with hypoglycemia in the past however this episode was more intense than he has had before. Today the patient is alert and oriented, back to his baseline. He states that he has had more frequent episodes of hypoglycemia since starting Ozempic, he has been working with his physician to lower insulin doses. Past Medical History:   Diagnosis Date    Angina at rest [facility])     Arthritis     \"Hands, Knees, Shoulders\"    CAD (coronary artery disease)     Sees Dr. Ady Henderson    Carotid  Doppler 2017    Duplex sonography with color flow enhancement was performed bilaterally on the cervical carotid system. No evidence of hemodynamically significant stenosis in the bilateral internal carotid arteries. There is evidence of hemodynamically significant stenosis of the bilateral external carotid arteries, L > R.    Cellulitis 2013    5th finger-Left hand-ER visit -report in Epic    Chronic back pain     Diabetes mellitus ([address]) Dx 2007    Fractured rib Dx 9-13    Full dentures     Gonorrhea Dx 1970's    \"Had Treatment\"    H/O cardiac catheterization 2013-Severe 3 vessel CAD-recomm CABG- Dr Maverick Hong - report in [facility]    H/O cardiovascular stress test 2013-mod ischemia inferior wall. EF 53%.  LVSF normal-Dr Maverick Hong - report in [facility]    H/O cardiovascular Patient given medication information sheet on atorvastatin in [place].

## 2023-07-24 NOTE — DISCHARGE SUMMARY
V2.0  Discharge Summary    Name:  Osorio Vega /Age/Sex: 1955 (69 y.o. male)   Admit Date: 2023  Discharge Date: 23    MRN & CSN:  [id number] & [id number] Encounter Date and Time 23 2:48 PM EDT    Attending:  Rosa Brown MD Discharging Provider: Lyndsey Keenan [facility] Course:     Brief HPI: Osorio Vega is a 69 y.o. male who presented with dysarthria, generalized weakness.      Problems addressed during this hospitalization:     Dysarthria  -Presented following waking up from sleep due to generalized weakness and dysarthria, BG 55  -Symptoms resolved upon arrival to the ED  -CT head unremarkable  -CTA head and neck with severe stenosis in the origin of the bilateral external carotid arteries  -MRI brain with no acute process  -Neurology consulted, suspect due to hypoglycemia, felt symptoms lasted too long to be a TIA  -Remained asymptomatic throughout observation stay  -Continue home aspirin, statin on discharge    Leukocytosis  -WBC 15.5 upon arrival, repeat 12.6 this a.m.  -Afebrile, denies signs or symptoms of infection, otherwise HDS  -chest x-ray no acute process, UA negative  -Patient had several episodes of vomiting after receiving glucagon, suspect reactive as leukocytosis improving without antibiotics  - received IV fluids    Bilateral external carotid stenosis  -known to patient, states he has seen his cardiologist for this in the past  -Continue aspirin, statin    Hypomagnesemia  -Resolved replacement  -Started p.o. mag supplement    T2DM  Hypoglycemia   -Was being managed on Lantus 40 units nightly and NovoLog 10 units 3 times daily with meals, metformin 500 mg  -Hemoglobin A1c 6.1   -Decrease Lantus to 25 units daily, NovoLog 5 units 3 times daily and continue metformin, BG remained stable during observation stay  - follow-up with PCP     CAD  -Continue home aspirin, statin, Imdur    Hypertension  -Continue home amlodipine, HCTZ,

## 2023-07-24 NOTE — H&P
ALT 13     Lipids:   Lab Results   Component Value Date/Time    CHOL 115 12/06/2022 04:02 PM    CHOL 173 02/10/2017 12:00 AM    HDL 51 12/06/2022 04:02 PM    TRIG 95 12/06/2022 04:02 PM     Hemoglobin A1C:   Lab Results   Component Value Date/Time    LABA1C 7.9 02/11/2017 06:50 AM     TSH:   Lab Results   Component Value Date/Time    TSH 1.06 12/06/2022 04:02 PM     Troponin:   Lab Results   Component Value Date/Time    TROPONINT <0.010 07/23/2023 11:12 PM    TROPONINT <0.010 01/03/2022 09:14 AM    TROPONINT <0.010 02/11/2017 06:50 AM     BNP: No results for input(s): PROBNP in the last 72 hours. Lactic Acid: No results for input(s): LACTA in the last 72 hours. UA:  Lab Results   Component Value Date/Time    NITRU NEGATIVE 07/24/2023 01:00 AM    COLORU YELLOW 07/24/2023 01:00 AM    WBCUA <1 11/12/2014 08:42 PM    RBCUA 2 11/12/2014 08:42 PM    MUCUS RARE 11/12/2014 08:42 PM    BACTERIA NEGATIVE 11/12/2014 08:42 PM    CLARITYU CLEAR 07/24/2023 01:00 AM    SPECGRAV 1.010 07/24/2023 01:00 AM    LEUKOCYTESUR NEGATIVE 07/24/2023 01:00 AM    UROBILINOGEN 0.2 07/24/2023 01:00 AM    BILIRUBINUR NEGATIVE 07/24/2023 01:00 AM    BLOODU NEGATIVE 07/24/2023 01:00 AM    KETUA 15 07/24/2023 01:00 AM     Urine Cultures: No results found for: LABURIN  Blood Cultures: No results found for: BC  No results found for: BLOODCULT2  Organism: No results found for: [facility]    Radiology results:  XR CHEST PORTABLE   Final Result   No acute process. CTA HEAD NECK W CONTRAST   Final Result   Severe stenosis in the origin of bilateral external carotid arteries. No   significant stenosis seen in the common and internal carotid arteries in the   neck. Moderately severe stenosis in the origin of left vertebral artery. No evidence of intracranial large vessel occlusion. CT HEAD WO CONTRAST   Final Result   Addendum (preliminary) 1 of 1   ADDENDUM:   Results communicated to Dr. Cecile Costa at 10:47 on 07/23/2023.          Final

## 2023-07-24 NOTE — PROGRESS NOTES
4 Eyes Skin Assessment     NAME:  Michelle Rondon  YOB: 1955  MEDICAL RECORD NUMBER:  [id number]    The patient is being assessed for  Admission    I agree that at least one RN has performed a thorough Head to Toe Skin Assessment on the patient. ALL assessment sites listed below have been assessed. Areas assessed by both nurses:    Head, Face, Ears, Shoulders, Back, Chest, Arms, Elbows, Hands, Sacrum. Buttock, Coccyx, Ischium, and Legs. Feet and Heels        Does the Patient have a Wound?  No noted wound(s)       Vargas Prevention initiated by RN: No  Wound Care Orders initiated by RN: No    Pressure Injury (Stage 3,4, Unstageable, DTI, NWPT, and Complex wounds) if present, place Wound referral order by RN under : No    New Ostomies, if present place, Ostomy referral order under : No     Nurse 1 eSignature: Electronically signed by Norman Mares RN on 7/24/23 at 5:54 AM EDT    **SHARE this note so that the co-signing nurse can place an eSignature**    Nurse 2 eSignature: Electronically signed by Pito Meng RN on 7/24/23 at 10:11 AM EDT

## 2023-07-24 NOTE — ED NOTES
ED TO INPATIENT SBAR HANDOFF    Patient Name: Larisa Dates   :  1955  69 y.o. Preferred Name    Family/Caregiver Present no   Restraints no   C-SSRS: Risk of Suicide: No Risk  Sitter no   Sepsis Risk Score Sepsis Risk Score: 3.92      Situation  Chief Complaint   Patient presents with    Cerebrovascular Accident     EMS called in with stroke alert. BS was 55 on arrival to home. Brief Description of Patient's Condition: Was a stroke alert. BS was 55 when EMS arrived to house. Had slurred speech on arrival to ED. Pt is back to baseline and will be observed. Mental Status: oriented and alert  Arrived from: home    Imaging:   XR CHEST PORTABLE   Final Result   No acute process. CTA HEAD NECK W CONTRAST   Final Result   Severe stenosis in the origin of bilateral external carotid arteries. No   significant stenosis seen in the common and internal carotid arteries in the   neck. Moderately severe stenosis in the origin of left vertebral artery. No evidence of intracranial large vessel occlusion. CT HEAD WO CONTRAST   Final Result   Addendum (preliminary)    ADDENDUM:   Results communicated to Dr. Serjio Edmonsd at 10:47 on 2023. Final   No acute intracranial abnormality. The findings were sent to the Radiology Results [address] [employer] [place] at 10:43   pm on 2023 to be communicated to a licensed caregiver.            Abnormal labs:   Abnormal Labs Reviewed   CBC WITH AUTO DIFFERENTIAL - Abnormal; Notable for the following components:       Result Value    WBC 15.5 (*)     MCHC 31.9 (*)     Segs Relative 85.2 (*)     Lymphocytes % 7.5 (*)     Monocytes % 5.7 (*)     Immature Neutrophil % 1.0 (*)     All other components within normal limits   URINALYSIS WITH REFLEX TO CULTURE - Abnormal; Notable for the following components:    Ketones, Urine 15 (*)     All other components within normal limits   SALICYLATE LEVEL - Abnormal; Notable for the following

## 2023-07-24 NOTE — ED PROVIDER NOTES
however overall there was no evidence of intracranial large vessel occlusion. I did review the radiologist reports. I reviewed the chest x-ray which showed no evidence of acute disease-no pneumothorax, obvious consolidation or pleural effusion. I reviewed the labs-she does have a white count of 15.5, no reported infectious symptoms. Salicylate is normal, Tylenol level normal.  Urine is clean, magnesium is 1.4, I replaced it. Electrolytes are otherwise wise unremarkable. On multiple reevaluations, the patient continues to improve. He is actually feeling like he is back to baseline at the time of admission. I discussed the case with the hospitalist who accepted admission. The patient was stable at the time of admission. Total critical care time today provided was 31 minutes. This excludes seperately billable procedure. Critical care time provided for neurologic/mental status that required close evaluation and/or intervention with concern for patient decompensation. Meds/treatments given:   Tylenol 650 mg p.o. Zofran 4 mg IV  Magnesium sulfate 2 g IV piggyback    Impression:  #1. Severe ataxia, dysarthria   #2.   Hypoglycemia, improved         Erica Bocanegra MD  07/24/23 [id number]

## 2023-07-24 NOTE — PLAN OF CARE
Problem: Discharge Planning  Goal: Discharge to home or other facility with appropriate resources  7/24/2023 1451 by Rosi Galo RN  Outcome: Completed  Flowsheets (Taken 7/24/2023 0825)  Discharge to home or other facility with appropriate resources:   Identify barriers to discharge with patient and caregiver   Arrange for needed discharge resources and transportation as appropriate   Identify discharge learning needs (meds, wound care, etc)   Arrange for interpreters to assist at discharge as needed   Refer to discharge planning if patient needs post-hospital services based on physician order or complex needs related to functional status, cognitive ability or social support system  7/24/2023 0456 by Shelby Mcelroy, RN  Outcome: Progressing  Flowsheets  Taken 7/24/2023 0450  Discharge to home or other facility with appropriate resources:   Identify barriers to discharge with patient and caregiver   Arrange for needed discharge resources and transportation as appropriate   Identify discharge learning needs (meds, wound care, etc)   Refer to discharge planning if patient needs post-hospital services based on physician order or complex needs related to functional status, cognitive ability or social support system  Taken 7/24/2023 0442  Discharge to home or other facility with appropriate resources:   Identify barriers to discharge with patient and caregiver   Arrange for needed discharge resources and transportation as appropriate   Identify discharge learning needs (meds, wound care, etc)   Refer to discharge planning if patient needs post-hospital services based on physician order or complex needs related to functional status, cognitive ability or social support system     Problem: Pain  Goal: Verbalizes/displays adequate comfort level or baseline comfort level  7/24/2023 1451 by Rosi Galo RN  Outcome: Completed  7/24/2023 0456 by Shelby Mcelroy RN  Outcome: Progressing     Problem: Safety - Adult  Goal: Free

## 2023-07-31 ENCOUNTER — HOSPITAL ENCOUNTER (OUTPATIENT)
Age: 68
Setting detail: SPECIMEN
Discharge: HOME | End: 2023-07-31

## 2023-08-01 LAB
ANION GAP SERPL CALCULATED.3IONS-SCNC: 12 MMOL/L (ref 9–17)
BUN SERPL-MCNC: 11 MG/DL (ref 8–23)
CALCIUM SERPL-MCNC: 9.6 MG/DL (ref 8.6–10.4)
CHLORIDE SERPL-SCNC: 103 MMOL/L (ref 98–107)
CO2 SERPL-SCNC: 26 MMOL/L (ref 20–31)
CORTIS SERPL-MCNC: 13 UG/DL (ref 2.7–18.4)
CREAT SERPL-MCNC: 0.9 MG/DL (ref 0.7–1.2)
ESTRADIOL LEVEL: 24.4 PG/ML (ref 27–52)
GFR SERPL CREATININE-BSD FRML MDRD: >60 ML/MIN/1.73M2
GLUCOSE SERPL-MCNC: 48 MG/DL (ref 70–99)
POTASSIUM SERPL-SCNC: 4.2 MMOL/L (ref 3.7–5.3)
SODIUM SERPL-SCNC: 141 MMOL/L (ref 135–144)
TESTOST SERPL-MCNC: 283 NG/DL (ref 220–1000)

## 2023-08-04 LAB
ACTH PLAS-MCNC: 9 PG/ML (ref 7–69)
DHEA: 0.75 NG/ML (ref 0.63–4.7)

## 2023-08-23 DIAGNOSIS — R20.1 HYPOESTHESIA: Primary | ICD-10-CM

## 2023-09-18 ENCOUNTER — OFFICE VISIT (OUTPATIENT)
Dept: CARDIOLOGY CLINIC | Age: 68
End: 2023-09-18
Payer: COMMERCIAL

## 2023-09-18 VITALS
HEIGHT: 66 IN | DIASTOLIC BLOOD PRESSURE: 64 MMHG | SYSTOLIC BLOOD PRESSURE: 108 MMHG | BODY MASS INDEX: 28.61 KG/M2 | WEIGHT: 178 LBS | HEART RATE: 80 BPM

## 2023-09-18 DIAGNOSIS — I73.9 PVD (PERIPHERAL VASCULAR DISEASE) (HCC): ICD-10-CM

## 2023-09-18 DIAGNOSIS — E11.59 TYPE 2 DIABETES MELLITUS WITH OTHER CIRCULATORY COMPLICATION, UNSPECIFIED WHETHER LONG TERM INSULIN USE (HCC): ICD-10-CM

## 2023-09-18 DIAGNOSIS — E78.5 HYPERLIPIDEMIA, UNSPECIFIED HYPERLIPIDEMIA TYPE: ICD-10-CM

## 2023-09-18 DIAGNOSIS — Z98.61 HISTORY OF PTCA: ICD-10-CM

## 2023-09-18 DIAGNOSIS — I25.810 CORONARY ARTERY DISEASE INVOLVING CORONARY BYPASS GRAFT OF NATIVE HEART WITHOUT ANGINA PECTORIS: Primary | ICD-10-CM

## 2023-09-18 DIAGNOSIS — E66.9 OBESITY (BMI 30-39.9): ICD-10-CM

## 2023-09-18 DIAGNOSIS — Z95.1 S/P CABG X 4: ICD-10-CM

## 2023-09-18 DIAGNOSIS — G47.33 OSA (OBSTRUCTIVE SLEEP APNEA): ICD-10-CM

## 2023-09-18 DIAGNOSIS — I10 PRIMARY HYPERTENSION: ICD-10-CM

## 2023-09-18 PROCEDURE — 99213 OFFICE O/P EST LOW 20 MIN: CPT | Performed by: INTERNAL MEDICINE

## 2023-09-18 PROCEDURE — 3074F SYST BP LT 130 MM HG: CPT | Performed by: INTERNAL MEDICINE

## 2023-09-18 PROCEDURE — G8427 DOCREV CUR MEDS BY ELIG CLIN: HCPCS | Performed by: INTERNAL MEDICINE

## 2023-09-18 PROCEDURE — 1123F ACP DISCUSS/DSCN MKR DOCD: CPT | Performed by: INTERNAL MEDICINE

## 2023-09-18 PROCEDURE — 2022F DILAT RTA XM EVC RTNOPTHY: CPT | Performed by: INTERNAL MEDICINE

## 2023-09-18 PROCEDURE — 3078F DIAST BP <80 MM HG: CPT | Performed by: INTERNAL MEDICINE

## 2023-09-18 PROCEDURE — 3017F COLORECTAL CA SCREEN DOC REV: CPT | Performed by: INTERNAL MEDICINE

## 2023-09-18 PROCEDURE — 3044F HG A1C LEVEL LT 7.0%: CPT | Performed by: INTERNAL MEDICINE

## 2023-09-18 PROCEDURE — G8417 CALC BMI ABV UP PARAM F/U: HCPCS | Performed by: INTERNAL MEDICINE

## 2023-09-18 PROCEDURE — 1036F TOBACCO NON-USER: CPT | Performed by: INTERNAL MEDICINE

## 2023-09-18 NOTE — PATIENT INSTRUCTIONS
CORONARY ARTERY DISEASE:Yes, S/P CABG 2013   clinically stable. Patient is on optimal medical regimen ( see medication list above )  - Patient is currently  asymptomatic from CAD. - changes in  treatment:   no, on ASA, Imdur & Coreg.           - Testing ordered:  no  [place] classification: 1  Stress test: 3/2020   Fair exercise tolerance. 7 METs work load. Physiological BP response to exercise. ETT negative for Ischemia / Arrhythmia. HTN:well controlled on current medical regimen, see list above. - changes in  treatment:   no, on Amlodipine, Lisinopril, HCTZ & COREG      CARDIOMYOPATHY: None known   CONGESTIVE HEART FAILURE: NO KNOWN HISTORY.      VHD: No significant VHD noted  ECHO 3/2022     Left ventricular function and size is normal, EF is estimated at 55-60%. Mild left ventricular hypertrophy. Grade I diastolic dysfunction. No regional wall motion abnormalities were detected. Mildly dilated left atrium. Mild mitral , tricuspid and pulmonic regurgitation is present. RVSP is 23 mmHg. No evidence of pericardial effusion. DYSLIPIDEMIA: Patient's profile is at / near Goal.yes,                                 HDL is low                                Tolerating current medical regimen well yes, takes Lipitor                                                              See most recent Lab values in Labs section above. Diabetes mellitis: .yes,                                      Managed by family MD                                     BS under good control yes,                                      Hgb A1c avilable yes,   CAROTID ARTERY DISEASE: yes, mild               No symptoms described pertaining to Carotid artery disease               Up to date on non invasive testing .yes,                Patient is on Guidelines recommended therapy. yes,       ARRHYTHMIAS: None known     TESTS ORDERED: None this visit      PREVIOUSLY ORDERED TESTS REVIEWED & DISCUSSED WITH THE

## 2023-09-18 NOTE — PROGRESS NOTES
1.0 07/24/2023    INR 0.9 07/23/2023     Lab Results   Component Value Date    LABA1C 6.1 07/24/2023    LABA1C 7.9 (H) 02/11/2017     Lab Results   Component Value Date    WBC 12.6 (H) 07/24/2023    WBC 15.5 (H) 07/23/2023    HGB 12.7 (L) 07/24/2023    HGB 14.6 07/23/2023    HCT 39.3 (L) 07/24/2023    HCT 45.8 07/23/2023    MCV 95.4 07/24/2023    MCV 97.0 07/23/2023     07/24/2023     07/23/2023     Lab Results   Component Value Date    CHOL 115 12/06/2022    CHOL 105 07/28/2022    TRIG 95 12/06/2022    TRIG 70 07/28/2022    HDL 51 12/06/2022    HDL 47 07/28/2022    LDLCALC 44 07/28/2022    LDLCALC 92 02/10/2017    LDLDIRECT 33 03/03/2020    LDLDIRECT 66 12/02/2014    CHOLHDLRATIO 2.3 12/06/2022    CHOLHDLRATIO 2.0 06/03/2021     Lab Results   Component Value Date    ALT 13 07/24/2023    ALT 13 07/23/2023    AST 26 07/24/2023    AST 19 07/23/2023     BMP:    Lab Results   Component Value Date/Time     07/31/2023 03:20 PM     07/24/2023 08:00 AM    K 4.2 07/31/2023 03:20 PM    K 3.9 07/24/2023 08:00 AM     07/31/2023 03:20 PM    CL 98 07/24/2023 08:00 AM    CO2 26 07/31/2023 03:20 PM    CO2 30 07/24/2023 08:00 AM    BUN 11 07/31/2023 03:20 PM    BUN 16 07/24/2023 08:00 AM    CREATININE 0.9 07/31/2023 03:20 PM    CREATININE 1.0 07/24/2023 08:00 AM     CMP:   Lab Results   Component Value Date/Time     07/31/2023 03:20 PM     07/24/2023 08:00 AM    K 4.2 07/31/2023 03:20 PM    K 3.9 07/24/2023 08:00 AM     07/31/2023 03:20 PM    CL 98 07/24/2023 08:00 AM    CO2 26 07/31/2023 03:20 PM    CO2 30 07/24/2023 08:00 AM    BUN 11 07/31/2023 03:20 PM    BUN 16 07/24/2023 08:00 AM    CREATININE 0.9 07/31/2023 03:20 PM    CREATININE 1.0 07/24/2023 08:00 AM    PROT 5.8 07/24/2023 08:00 AM    PROT 6.4 07/23/2023 11:12 PM    PROT 7.7 03/06/2013 07:10 AM    PROT 6.9 02/02/2013 05:00 PM     Lab Results   Component Value Date/Time    TSH 1.06 12/06/2022 04:02 PM       QUALITY MEASURES

## 2023-11-08 ENCOUNTER — HOSPITAL ENCOUNTER (OUTPATIENT)
Age: 68
Setting detail: SPECIMEN
Discharge: HOME | End: 2023-11-08

## 2023-11-09 LAB
ALBUMIN SERPL-MCNC: 4.2 G/DL (ref 3.5–5.2)
ALBUMIN/GLOB SERPL: 2 {RATIO} (ref 1–2.5)
ALP SERPL-CCNC: 78 U/L (ref 40–129)
ALT SERPL-CCNC: 25 U/L (ref 10–50)
ANION GAP SERPL CALCULATED.3IONS-SCNC: 13 MMOL/L (ref 9–16)
AST SERPL-CCNC: 39 U/L (ref 10–50)
BASOPHILS # BLD: 0.05 K/UL (ref 0–0.2)
BASOPHILS NFR BLD: 1 % (ref 0–2)
BILIRUB SERPL-MCNC: 0.4 MG/DL (ref 0–1.2)
BUN SERPL-MCNC: 15 MG/DL (ref 8–23)
CALCIUM SERPL-MCNC: 9.7 MG/DL (ref 8.6–10.4)
CHLORIDE SERPL-SCNC: 98 MMOL/L (ref 98–107)
CHOLEST SERPL-MCNC: 100 MG/DL (ref 0–199)
CHOLESTEROL/HDL RATIO: 2
CO2 SERPL-SCNC: 27 MMOL/L (ref 20–31)
CREAT SERPL-MCNC: 1.1 MG/DL (ref 0.7–1.2)
EOSINOPHIL # BLD: 0.25 K/UL (ref 0–0.44)
EOSINOPHILS RELATIVE PERCENT: 2 % (ref 1–4)
ERYTHROCYTE [DISTWIDTH] IN BLOOD BY AUTOMATED COUNT: 11.7 % (ref 11.8–14.4)
GFR SERPL CREATININE-BSD FRML MDRD: >60 ML/MIN/1.73M2
GLUCOSE SERPL-MCNC: 99 MG/DL (ref 74–99)
HCT VFR BLD AUTO: 48.4 % (ref 40.7–50.3)
HDLC SERPL-MCNC: 50 MG/DL
HGB BLD-MCNC: 15.5 G/DL (ref 13–17)
IMM GRANULOCYTES # BLD AUTO: 0.05 K/UL (ref 0–0.3)
IMM GRANULOCYTES NFR BLD: 1 %
LDLC SERPL CALC-MCNC: 37 MG/DL (ref 0–100)
LYMPHOCYTES NFR BLD: 2.1 K/UL (ref 1.1–3.7)
LYMPHOCYTES RELATIVE PERCENT: 19 % (ref 24–43)
MCH RBC QN AUTO: 31.6 PG (ref 25.2–33.5)
MCHC RBC AUTO-ENTMCNC: 32 G/DL (ref 28.4–34.8)
MCV RBC AUTO: 98.8 FL (ref 82.6–102.9)
MONOCYTES NFR BLD: 1.05 K/UL (ref 0.1–1.2)
MONOCYTES NFR BLD: 10 % (ref 3–12)
NEUTROPHILS NFR BLD: 68 % (ref 36–65)
NEUTS SEG NFR BLD: 7.3 K/UL (ref 1.5–8.1)
NRBC BLD-RTO: 0 PER 100 WBC
PLATELET # BLD AUTO: ABNORMAL K/UL (ref 138–453)
PLATELET, FLUORESCENCE: 192 K/UL (ref 138–453)
PLATELETS.RETICULATED NFR BLD AUTO: 6.9 % (ref 1.1–10.3)
POTASSIUM SERPL-SCNC: 4.6 MMOL/L (ref 3.7–5.3)
PROT SERPL-MCNC: 6.9 G/DL (ref 6.6–8.7)
PSA SERPL-MCNC: 1.1 NG/ML (ref 0–4)
RBC # BLD AUTO: 4.9 M/UL (ref 4.21–5.77)
SODIUM SERPL-SCNC: 138 MMOL/L (ref 136–145)
TRIGL SERPL-MCNC: 65 MG/DL (ref 0–149)
VLDLC SERPL CALC-MCNC: 13 MG/DL
WBC OTHER # BLD: 10.8 K/UL (ref 3.5–11.3)

## 2023-11-27 ENCOUNTER — HOSPITAL ENCOUNTER (OUTPATIENT)
Age: 68
Setting detail: SPECIMEN
Discharge: HOME | End: 2023-11-27

## 2023-11-27 LAB
BASOPHILS # BLD: 0.06 K/UL (ref 0–0.2)
BASOPHILS NFR BLD: 1 % (ref 0–2)
EOSINOPHIL # BLD: 0.08 K/UL (ref 0–0.44)
EOSINOPHILS RELATIVE PERCENT: 1 % (ref 1–4)
ERYTHROCYTE [DISTWIDTH] IN BLOOD BY AUTOMATED COUNT: 11.7 % (ref 11.8–14.4)
HCT VFR BLD AUTO: 47.7 % (ref 40.7–50.3)
HGB BLD-MCNC: 15.3 G/DL (ref 13–17)
IMM GRANULOCYTES # BLD AUTO: 0.04 K/UL (ref 0–0.3)
IMM GRANULOCYTES NFR BLD: 0 %
LYMPHOCYTES NFR BLD: 2.2 K/UL (ref 1.1–3.7)
LYMPHOCYTES RELATIVE PERCENT: 24 % (ref 24–43)
MCH RBC QN AUTO: 31.1 PG (ref 25.2–33.5)
MCHC RBC AUTO-ENTMCNC: 32.1 G/DL (ref 28.4–34.8)
MCV RBC AUTO: 97 FL (ref 82.6–102.9)
MONOCYTES NFR BLD: 1.06 K/UL (ref 0.1–1.2)
MONOCYTES NFR BLD: 12 % (ref 3–12)
NEUTROPHILS NFR BLD: 62 % (ref 36–65)
NEUTS SEG NFR BLD: 5.7 K/UL (ref 1.5–8.1)
NRBC BLD-RTO: 0 PER 100 WBC
PLATELET # BLD AUTO: ABNORMAL K/UL (ref 138–453)
PLATELET, FLUORESCENCE: 201 K/UL (ref 138–453)
PLATELETS.RETICULATED NFR BLD AUTO: 6 % (ref 1.1–10.3)
RBC # BLD AUTO: 4.92 M/UL (ref 4.21–5.77)
WBC OTHER # BLD: 9.1 K/UL (ref 3.5–11.3)

## 2023-11-28 LAB
ALBUMIN SERPL-MCNC: 4.2 G/DL (ref 3.5–5.2)
ANION GAP SERPL CALCULATED.3IONS-SCNC: 13 MMOL/L (ref 9–16)
BUN SERPL-MCNC: 14 MG/DL (ref 8–23)
CALCIUM SERPL-MCNC: 9.1 MG/DL (ref 8.6–10.4)
CHLORIDE SERPL-SCNC: 100 MMOL/L (ref 98–107)
CO2 SERPL-SCNC: 27 MMOL/L (ref 20–31)
CREAT SERPL-MCNC: 1 MG/DL (ref 0.7–1.2)
GFR SERPL CREATININE-BSD FRML MDRD: >60 ML/MIN/1.73M2
GLUCOSE SERPL-MCNC: 106 MG/DL (ref 74–99)
POTASSIUM SERPL-SCNC: 4.4 MMOL/L (ref 3.7–5.3)
SODIUM SERPL-SCNC: 140 MMOL/L (ref 136–145)

## 2024-01-23 ENCOUNTER — HOSPITAL ENCOUNTER (OUTPATIENT)
Age: 69
Setting detail: SPECIMEN
Discharge: HOME | End: 2024-01-23
Payer: MEDICARE

## 2024-01-23 PROCEDURE — 84585 ASSAY OF URINE VMA: CPT

## 2024-01-23 PROCEDURE — 81050 URINALYSIS VOLUME MEASURE: CPT

## 2024-01-23 PROCEDURE — 84156 ASSAY OF PROTEIN URINE: CPT

## 2024-01-23 PROCEDURE — 83835 ASSAY OF METANEPHRINES: CPT

## 2024-01-23 PROCEDURE — 82384 ASSAY THREE CATECHOLAMINES: CPT

## 2024-01-24 ENCOUNTER — HOSPITAL ENCOUNTER (OUTPATIENT)
Age: 69
Discharge: HOME | End: 2024-01-24
Payer: MEDICARE

## 2024-01-24 DIAGNOSIS — N20.0 KIDNEY STONE: ICD-10-CM

## 2024-01-24 DIAGNOSIS — I10 PRIMARY HYPERTENSION: ICD-10-CM

## 2024-01-24 DIAGNOSIS — E27.8 ADRENAL NODULE (HCC): ICD-10-CM

## 2024-01-24 DIAGNOSIS — I25.810 CORONARY ARTERY DISEASE INVOLVING CORONARY BYPASS GRAFT OF NATIVE HEART WITHOUT ANGINA PECTORIS: ICD-10-CM

## 2024-01-24 DIAGNOSIS — E11.59 TYPE 2 DIABETES MELLITUS WITH OTHER CIRCULATORY COMPLICATION, UNSPECIFIED WHETHER LONG TERM INSULIN USE (HCC): ICD-10-CM

## 2024-01-24 DIAGNOSIS — Z95.1 S/P CABG X 4: ICD-10-CM

## 2024-01-24 LAB
ALBUMIN SERPL-MCNC: 4.4 GM/DL (ref 3.4–5)
ANION GAP SERPL CALCULATED.3IONS-SCNC: 13 MMOL/L (ref 7–16)
BASOPHILS ABSOLUTE: 0 K/CU MM
BASOPHILS RELATIVE PERCENT: 0.4 % (ref 0–1)
BILIRUBIN URINE: NEGATIVE MG/DL
BLOOD, URINE: NEGATIVE
BUN SERPL-MCNC: 17 MG/DL (ref 6–23)
CALCIUM SERPL-MCNC: 9.3 MG/DL (ref 8.3–10.6)
CHLORIDE BLD-SCNC: 98 MMOL/L (ref 99–110)
CLARITY: CLEAR
CO2: 28 MMOL/L (ref 21–32)
COLOR: YELLOW
COMMENT UA: NORMAL
CREAT SERPL-MCNC: 1.1 MG/DL (ref 0.9–1.3)
DIFFERENTIAL TYPE: ABNORMAL
EOSINOPHILS ABSOLUTE: 0.5 K/CU MM
EOSINOPHILS RELATIVE PERCENT: 5.2 % (ref 0–3)
GFR SERPL CREATININE-BSD FRML MDRD: >60 ML/MIN/1.73M2
GLUCOSE SERPL-MCNC: 161 MG/DL (ref 70–99)
GLUCOSE, URINE: NEGATIVE MG/DL
HCT VFR BLD CALC: 44.1 % (ref 42–52)
HEMOGLOBIN: 14.2 GM/DL (ref 13.5–18)
IMMATURE NEUTROPHIL %: 0.4 % (ref 0–0.43)
KETONES, URINE: NEGATIVE MG/DL
LEUKOCYTE ESTERASE, URINE: NEGATIVE
LYMPHOCYTES ABSOLUTE: 2.1 K/CU MM
LYMPHOCYTES RELATIVE PERCENT: 21.6 % (ref 24–44)
Lab: 24 HRS
MCH RBC QN AUTO: 31.7 PG (ref 27–31)
MCHC RBC AUTO-ENTMCNC: 32.2 % (ref 32–36)
MCV RBC AUTO: 98.4 FL (ref 78–100)
MONOCYTES ABSOLUTE: 0.9 K/CU MM
MONOCYTES RELATIVE PERCENT: 9.7 % (ref 0–4)
NITRITE URINE, QUANTITATIVE: NEGATIVE
NUCLEATED RBC %: 0 %
PDW BLD-RTO: 11.9 % (ref 11.7–14.9)
PH, URINE: 6 (ref 5–8)
PHOSPHORUS: 3.2 MG/DL (ref 2.5–4.9)
PLATELET # BLD: 194 K/CU MM (ref 140–440)
PMV BLD AUTO: 12.6 FL (ref 7.5–11.1)
POTASSIUM SERPL-SCNC: 5 MMOL/L (ref 3.5–5.1)
PROTEIN 24 HOUR URINE: 94 MG/24 HR
PROTEIN UA: NEGATIVE MG/DL
RBC # BLD: 4.48 M/CU MM (ref 4.6–6.2)
SEGMENTED NEUTROPHILS ABSOLUTE COUNT: 6 K/CU MM
SEGMENTED NEUTROPHILS RELATIVE PERCENT: 62.7 % (ref 36–66)
SODIUM BLD-SCNC: 139 MMOL/L (ref 135–145)
SPECIFIC GRAVITY UA: 1.02 (ref 1–1.03)
TOTAL IMMATURE NEUTOROPHIL: 0.04 K/CU MM
TOTAL NUCLEATED RBC: 0 K/CU MM
UROBILINOGEN, URINE: 0.2 MG/DL (ref 0.2–1)
VOLUME, (UVOL): 1700 MLS
WBC # BLD: 9.6 K/CU MM (ref 4–10.5)

## 2024-01-24 PROCEDURE — 80048 BASIC METABOLIC PNL TOTAL CA: CPT

## 2024-01-24 PROCEDURE — 81003 URINALYSIS AUTO W/O SCOPE: CPT

## 2024-01-24 PROCEDURE — 84244 ASSAY OF RENIN: CPT

## 2024-01-24 PROCEDURE — 82088 ASSAY OF ALDOSTERONE: CPT

## 2024-01-24 PROCEDURE — 36415 COLL VENOUS BLD VENIPUNCTURE: CPT

## 2024-01-24 PROCEDURE — 84100 ASSAY OF PHOSPHORUS: CPT

## 2024-01-24 PROCEDURE — 85025 COMPLETE CBC W/AUTO DIFF WBC: CPT

## 2024-01-24 PROCEDURE — 82040 ASSAY OF SERUM ALBUMIN: CPT

## 2024-01-26 LAB — ALDOST SERPL-MCNC: 22.9 NG/DL

## 2024-01-27 LAB
24HR URINE VOLUME (ML): 1700
CREATININE URINE: 72 MG/DL
CREATININE, 24H UR: 1224 MG/D (ref 800–2100)
RENIN PLAS-CCNC: 2.4 NG/ML/HR
TIME URINE: 24
VMA & CREAT 24H UR-IMP: NORMAL
VMA 24H UR-MCNC: 1.9 MG/L
VMA 24H UR-MRATE: 3.2 MG/D (ref 0–7)
VMA/CREAT 24H UR: 3 MG/GCR (ref 0–6)

## 2024-01-29 LAB
CATECHOLS UR-IMP: NORMAL
CREATININE URINE: 72 MG/DL
CREATININE, 24H UR: 1224 MG/D (ref 800–2100)
DOPAMINE 24H UR-MRATE: 141 UG/D (ref 71–485)
DOPAMINE UR-MCNC: 83 UG/L
DOPAMINE/CREAT UR: 115 UG/G CRT (ref 0–250)
EPINEPH 24H UR-MRATE: 3 UG/D (ref 1–14)
EPINEPH UR-MCNC: 2 UG/L
EPINEPH/CREAT UR: 3 UG/G CRT (ref 0–20)
NOREPINEPH 24H UR-MRATE: 29 UG/D (ref 14–120)
NOREPINEPH UR-MCNC: 17 UG/L
NOREPINEPH/CREAT UR: 24 UG/G CRT (ref 0–45)
TIME URINE: 24
VOLUME, (UVOL): 1700

## 2024-01-30 PROBLEM — N18.1 CHRONIC KIDNEY DISEASE, STAGE I: Status: ACTIVE | Noted: 2024-01-30

## 2024-01-30 LAB
CREATININE URINE: 103 UG/G CRT (ref 0–300)
CREATININE URINE: 72 MG/DL
CREATININE, 24H UR: 1224 MG/D (ref 800–2100)
METANEPHRINE UF INTERPRETATION: NORMAL
METANEPHRINES URINE: 126 UG/D (ref 55–320)
METANEPHRINES, NMOL/L: 74 UG/L
NORMETANEPHRINE 24 HOUR URINE: 212 UG/D (ref 114–865)
NORMETANEPHRINE URINE: 174 UG/G CRT (ref 0–400)
NORMETANEPHRINES, NMOL/L: 125 UG/L
TIME URINE: 24
VOLUME, (UVOL): 1700

## 2024-02-13 ENCOUNTER — OFFICE VISIT (OUTPATIENT)
Dept: CARDIOLOGY CLINIC | Age: 69
End: 2024-02-13
Payer: MEDICARE

## 2024-02-13 VITALS
HEART RATE: 64 BPM | SYSTOLIC BLOOD PRESSURE: 110 MMHG | BODY MASS INDEX: 27.26 KG/M2 | DIASTOLIC BLOOD PRESSURE: 72 MMHG | WEIGHT: 169.6 LBS | HEIGHT: 66 IN

## 2024-02-13 DIAGNOSIS — Z95.1 S/P CABG X 4: ICD-10-CM

## 2024-02-13 DIAGNOSIS — I10 PRIMARY HYPERTENSION: ICD-10-CM

## 2024-02-13 DIAGNOSIS — E78.5 HYPERLIPIDEMIA, UNSPECIFIED HYPERLIPIDEMIA TYPE: ICD-10-CM

## 2024-02-13 DIAGNOSIS — I25.810 CORONARY ARTERY DISEASE INVOLVING CORONARY BYPASS GRAFT OF NATIVE HEART WITHOUT ANGINA PECTORIS: Primary | ICD-10-CM

## 2024-02-13 DIAGNOSIS — E11.59 TYPE 2 DIABETES MELLITUS WITH OTHER CIRCULATORY COMPLICATION, UNSPECIFIED WHETHER LONG TERM INSULIN USE (HCC): ICD-10-CM

## 2024-02-13 DIAGNOSIS — Z98.61 HISTORY OF PTCA: ICD-10-CM

## 2024-02-13 DIAGNOSIS — G47.33 OSA (OBSTRUCTIVE SLEEP APNEA): ICD-10-CM

## 2024-02-13 DIAGNOSIS — I73.9 PVD (PERIPHERAL VASCULAR DISEASE) (HCC): ICD-10-CM

## 2024-02-13 PROBLEM — E66.9 OBESITY (BMI 30-39.9): Status: RESOLVED | Noted: 2023-01-04 | Resolved: 2024-02-13

## 2024-02-13 PROCEDURE — 99213 OFFICE O/P EST LOW 20 MIN: CPT | Performed by: INTERNAL MEDICINE

## 2024-02-13 PROCEDURE — 3074F SYST BP LT 130 MM HG: CPT | Performed by: INTERNAL MEDICINE

## 2024-02-13 PROCEDURE — G8427 DOCREV CUR MEDS BY ELIG CLIN: HCPCS | Performed by: INTERNAL MEDICINE

## 2024-02-13 PROCEDURE — 1036F TOBACCO NON-USER: CPT | Performed by: INTERNAL MEDICINE

## 2024-02-13 PROCEDURE — 3017F COLORECTAL CA SCREEN DOC REV: CPT | Performed by: INTERNAL MEDICINE

## 2024-02-13 PROCEDURE — G8417 CALC BMI ABV UP PARAM F/U: HCPCS | Performed by: INTERNAL MEDICINE

## 2024-02-13 PROCEDURE — 3078F DIAST BP <80 MM HG: CPT | Performed by: INTERNAL MEDICINE

## 2024-02-13 PROCEDURE — G8484 FLU IMMUNIZE NO ADMIN: HCPCS | Performed by: INTERNAL MEDICINE

## 2024-02-13 PROCEDURE — 1123F ACP DISCUSS/DSCN MKR DOCD: CPT | Performed by: INTERNAL MEDICINE

## 2024-02-13 PROCEDURE — 2022F DILAT RTA XM EVC RTNOPTHY: CPT | Performed by: INTERNAL MEDICINE

## 2024-02-13 PROCEDURE — 3046F HEMOGLOBIN A1C LEVEL >9.0%: CPT | Performed by: INTERNAL MEDICINE

## 2024-02-13 NOTE — PROGRESS NOTES
OFFICE PROGRESS NOTES      Elie is a 68 y.o. male who has    CHIEF COMPLAINT AS FOLLOWS:  CHEST PAIN: Patient denies any C/O chest pains at this time.      SOB: No C/O SOB at this time.                 LEG EDEMA: No leg edema   PALPITATIONS: Denies any C/O Palpitations   DIZZINESS: No C/O Dizziness     SYNCOPE: None   OTHER/ ADDITIONAL COMPLAINTS:                                     HPI: Patient is here for F/U on his CAD, HTN & Dyslipidemia problems.   CAD: Patient has known CAD. Had angioplasty / CABG, both in the past.  HTN: Patient has known essential HTN. Has been treated with guideline recommended medical / physical/ diet therapy as stated below.  Dyslipidemia: Patient has known mixed dyslipidemia. Has been treated with guideline recommended medical / physical/ diet therapy as stated below.                Current Outpatient Medications   Medication Sig Dispense Refill    carvedilol (COREG) 25 MG tablet Take 1 tablet by mouth 2 times daily (with meals)      OZEMPIC, 0.25 OR 0.5 MG/DOSE, 2 MG/3ML SOPN 0.5mg subcutaneous once weekly      Naproxen Sodium (ALEVE PO) Take by mouth as needed      spironolactone (ALDACTONE) 25 MG tablet Take 1 tablet by mouth 2 times daily 180 tablet 3    BASAGLAR KWIKPEN 100 UNIT/ML injection pen Inject 25 Units into the skin daily (Patient taking differently: Inject 20 Units into the skin daily) 5 Adjustable Dose Pre-filled Pen Syringe 6    insulin aspart (NOVOLOG FLEXPEN) 100 UNIT/ML injection pen Inject 5 Units into the skin 3 times daily (before meals) (Patient taking differently: Inject into the skin 3 times daily (before meals) 0-7 units with meals) 5 Adjustable Dose Pre-filled Pen Syringe 3    magnesium oxide (MAG-OX) 400 (240 Mg) MG tablet Take 1 tablet by mouth daily 30 tablet 2    atorvastatin (LIPITOR) 40 MG tablet       niacin 500 MG extended release capsule Take 1 capsule by mouth nightly      b complex vitamins capsule Take 1 capsule by mouth daily      vitamin C

## 2024-02-13 NOTE — PATIENT INSTRUCTIONS
CORONARY ARTERY DISEASE:Yes, S/P CABG 2013   clinically stable. Patient is on optimal medical regimen ( see medication list above )  - Patient is currently  asymptomatic from CAD.            - changes in  treatment:   no, on ASA, Imdur & Coreg.           - Testing ordered:  no  [place] classification: 1  Stress test: 3/2020   Fair exercise tolerance. 7 METs work load.   Physiological BP response to exercise.   ETT negative for Ischemia / Arrhythmia.     HTN:well controlled on current medical regimen, see list above.              - changes in  treatment:   no, on Amlodipine, Lisinopril, HCTZ & COREG      CARDIOMYOPATHY: None known   CONGESTIVE HEART FAILURE: NO KNOWN HISTORY.      VHD: No significant VHD noted  ECHO 3/2022     Left ventricular function and size is normal, EF is estimated at 55-60%.   Mild left ventricular hypertrophy.   Grade I diastolic dysfunction.   No regional wall motion abnormalities were detected.   Mildly dilated left atrium.   Mild mitral , tricuspid and pulmonic regurgitation is present.   RVSP is 23 mmHg.   No evidence of pericardial effusion.    DYSLIPIDEMIA: Patient's profile is at / near Goal.yes,                                 HDL is low                                Tolerating current medical regimen well yes, takes Lipitor                                                              See most recent Lab values in Labs section above.   Diabetes mellitis: .yes,                                      Managed by family MD                                     BS under good control yes,                                      Hgb A1c avilable yes,     CAROTID ARTERY DISEASE: yes, mild               No symptoms described pertaining to Carotid artery disease               Up to date on non invasive testing .yes,                Patient is on Guidelines recommended therapy.yes,       ARRHYTHMIAS: None known     TESTS ORDERED: None this visit     PREVIOUSLY ORDERED TESTS REVIEWED & DISCUSSED WITH

## 2024-07-10 ENCOUNTER — HOSPITAL ENCOUNTER (OUTPATIENT)
Age: 69
Discharge: HOME | End: 2024-07-10
Payer: MEDICAID

## 2024-07-10 DIAGNOSIS — I10 PRIMARY HYPERTENSION: ICD-10-CM

## 2024-07-10 DIAGNOSIS — Z95.1 S/P CABG X 4: ICD-10-CM

## 2024-07-10 DIAGNOSIS — N18.1 CHRONIC KIDNEY DISEASE, STAGE I: ICD-10-CM

## 2024-07-10 LAB
ALBUMIN SERPL-MCNC: 4.4 GM/DL (ref 3.4–5)
ANION GAP SERPL CALCULATED.3IONS-SCNC: 11 MMOL/L (ref 7–16)
BILIRUBIN, URINE: NEGATIVE MG/DL
BLOOD, URINE: NEGATIVE
BUN SERPL-MCNC: 25 MG/DL (ref 6–23)
CALCIUM SERPL-MCNC: 9.7 MG/DL (ref 8.3–10.6)
CHLORIDE BLD-SCNC: 106 MMOL/L (ref 99–110)
CLARITY, UA: CLEAR
CO2: 23 MMOL/L (ref 21–32)
COLOR, UA: YELLOW
COMMENT UA: NORMAL
CREAT SERPL-MCNC: 1.2 MG/DL (ref 0.9–1.3)
CREATININE URINE: 126.9 MG/DL (ref 39–259)
GFR, ESTIMATED: 65 ML/MIN/1.73M2
GLUCOSE SERPL-MCNC: 161 MG/DL (ref 70–99)
GLUCOSE URINE: NEGATIVE MG/DL
KETONES, URINE: NEGATIVE MG/DL
LEUKOCYTE ESTERASE, URINE: NEGATIVE
MAGNESIUM: 1.5 MG/DL (ref 1.8–2.4)
NITRITE URINE, QUANTITATIVE: NEGATIVE
PH, URINE: 5.5 (ref 5–8)
PHOSPHORUS: 3.2 MG/DL (ref 2.5–4.9)
POTASSIUM SERPL-SCNC: 4.8 MMOL/L (ref 3.5–5.1)
PROT/CREAT RATIO, UR: 0.1
PROTEIN UA: NEGATIVE MG/DL
SODIUM BLD-SCNC: 140 MMOL/L (ref 135–145)
SPECIFIC GRAVITY UA: 1.02 (ref 1–1.03)
URINE TOTAL PROTEIN: 7.8 MG/DL
UROBILINOGEN, URINE: 0.2 MG/DL (ref 0.2–1)

## 2024-07-10 PROCEDURE — 84100 ASSAY OF PHOSPHORUS: CPT

## 2024-07-10 PROCEDURE — 36415 COLL VENOUS BLD VENIPUNCTURE: CPT

## 2024-07-10 PROCEDURE — 82040 ASSAY OF SERUM ALBUMIN: CPT

## 2024-07-10 PROCEDURE — 84156 ASSAY OF PROTEIN URINE: CPT

## 2024-07-10 PROCEDURE — 82570 ASSAY OF URINE CREATININE: CPT

## 2024-07-10 PROCEDURE — 81003 URINALYSIS AUTO W/O SCOPE: CPT

## 2024-07-10 PROCEDURE — 80048 BASIC METABOLIC PNL TOTAL CA: CPT

## 2024-07-10 PROCEDURE — 83735 ASSAY OF MAGNESIUM: CPT

## 2024-07-15 PROBLEM — E83.42 HYPOMAGNESEMIA: Status: ACTIVE | Noted: 2024-07-15

## 2024-08-13 ENCOUNTER — OFFICE VISIT (OUTPATIENT)
Dept: CARDIOLOGY CLINIC | Age: 69
End: 2024-08-13
Payer: MEDICAID

## 2024-08-13 VITALS
HEIGHT: 66 IN | WEIGHT: 159 LBS | SYSTOLIC BLOOD PRESSURE: 118 MMHG | DIASTOLIC BLOOD PRESSURE: 62 MMHG | HEART RATE: 66 BPM | BODY MASS INDEX: 25.55 KG/M2

## 2024-08-13 DIAGNOSIS — I25.810 CORONARY ARTERY DISEASE INVOLVING CORONARY BYPASS GRAFT OF NATIVE HEART WITHOUT ANGINA PECTORIS: Primary | ICD-10-CM

## 2024-08-13 DIAGNOSIS — E78.5 HYPERLIPIDEMIA, UNSPECIFIED HYPERLIPIDEMIA TYPE: ICD-10-CM

## 2024-08-13 DIAGNOSIS — E11.59 TYPE 2 DIABETES MELLITUS WITH OTHER CIRCULATORY COMPLICATION, UNSPECIFIED WHETHER LONG TERM INSULIN USE (HCC): ICD-10-CM

## 2024-08-13 DIAGNOSIS — I73.9 PVD (PERIPHERAL VASCULAR DISEASE) (HCC): ICD-10-CM

## 2024-08-13 DIAGNOSIS — I10 PRIMARY HYPERTENSION: ICD-10-CM

## 2024-08-13 DIAGNOSIS — Z95.1 S/P CABG X 4: ICD-10-CM

## 2024-08-13 DIAGNOSIS — Z98.61 HISTORY OF PTCA: ICD-10-CM

## 2024-08-13 DIAGNOSIS — G47.33 OSA (OBSTRUCTIVE SLEEP APNEA): ICD-10-CM

## 2024-08-13 PROCEDURE — 3074F SYST BP LT 130 MM HG: CPT | Performed by: INTERNAL MEDICINE

## 2024-08-13 PROCEDURE — G8427 DOCREV CUR MEDS BY ELIG CLIN: HCPCS | Performed by: INTERNAL MEDICINE

## 2024-08-13 PROCEDURE — 3017F COLORECTAL CA SCREEN DOC REV: CPT | Performed by: INTERNAL MEDICINE

## 2024-08-13 PROCEDURE — 2022F DILAT RTA XM EVC RTNOPTHY: CPT | Performed by: INTERNAL MEDICINE

## 2024-08-13 PROCEDURE — G8417 CALC BMI ABV UP PARAM F/U: HCPCS | Performed by: INTERNAL MEDICINE

## 2024-08-13 PROCEDURE — 3078F DIAST BP <80 MM HG: CPT | Performed by: INTERNAL MEDICINE

## 2024-08-13 PROCEDURE — 1123F ACP DISCUSS/DSCN MKR DOCD: CPT | Performed by: INTERNAL MEDICINE

## 2024-08-13 PROCEDURE — 1036F TOBACCO NON-USER: CPT | Performed by: INTERNAL MEDICINE

## 2024-08-13 PROCEDURE — 99213 OFFICE O/P EST LOW 20 MIN: CPT | Performed by: INTERNAL MEDICINE

## 2024-08-13 PROCEDURE — 3046F HEMOGLOBIN A1C LEVEL >9.0%: CPT | Performed by: INTERNAL MEDICINE

## 2024-08-13 NOTE — PROGRESS NOTES
OFFICE PROGRESS NOTES      Elie is a 68 y.o. male who has    CHIEF COMPLAINT AS FOLLOWS:  CHEST PAIN:  Patient denies any C/O chest pains at this time.      SOB: No C/O SOB at this time.                 LEG EDEMA: No leg edema   PALPITATIONS: Denies any C/O Palpitations   DIZZINESS: No C/O Dizziness     SYNCOPE: None   OTHER/ ADDITIONAL COMPLAINTS: Says had slow HR in 40s about a week ago, lasting about 10 min. Did not happen since after that.                                    HPI: Patient is here for F/U on his CAD, HTN & Dyslipidemia problems.   CAD: Patient has known CAD. Had angioplasty / CABG, both in the past.  HTN: Patient has known essential HTN. Has been treated with guideline recommended medical / physical/ diet therapy as stated below.  Dyslipidemia: Patient has known mixed dyslipidemia. Has been treated with guideline recommended medical / physical/ diet therapy as stated below.                Current Outpatient Medications   Medication Sig Dispense Refill    ONETOUCH VERIO strip USE AS DIRECTED TO TEST BLOOD GLUCOSE THREE TIMES DAILY AND AS NEEDED      BD PEN NEEDLE MARLYS 2ND GEN 32G X 4 MM MISC TO USE WITH INSULIN 4 TIMES A DAY AND ONCE WEEKLY OZEMPIC      OZEMPIC, 1 MG/DOSE, 4 MG/3ML SOPN sc injection Inject 1 mg into the skin every 7 days      carvedilol (COREG) 25 MG tablet Take 1 tablet by mouth 2 times daily (with meals)      spironolactone (ALDACTONE) 25 MG tablet Take 1 tablet by mouth 2 times daily 180 tablet 3    BASAGLAR KWIKPEN 100 UNIT/ML injection pen Inject 25 Units into the skin daily (Patient taking differently: Inject 10 Units into the skin daily) 5 Adjustable Dose Pre-filled Pen Syringe 6    insulin aspart (NOVOLOG FLEXPEN) 100 UNIT/ML injection pen Inject 5 Units into the skin 3 times daily (before meals) (Patient taking differently: Inject into the skin 3 times daily (before meals) 0-7 units with meals) 5 Adjustable Dose Pre-filled Pen Syringe 3    atorvastatin (LIPITOR) 40 MG

## 2024-08-13 NOTE — PATIENT INSTRUCTIONS
CORONARY ARTERY DISEASE:Yes, S/P CABG 2013   clinically stable. Patient is on optimal medical regimen ( see medication list above )  - Patient is currently  asymptomatic from CAD.            - changes in  treatment:   no, on ASA, Imdur & Coreg.           - Testing ordered:  no  [place] classification: 1  Stress test: 3/2020   Fair exercise tolerance. 7 METs work load.   Physiological BP response to exercise.   ETT negative for Ischemia / Arrhythmia.     HTN:well controlled on current medical regimen, see list above.              - changes in  treatment:   no, on Amlodipine, Lisinopril, HCTZ & COREG      CARDIOMYOPATHY: None known   CONGESTIVE HEART FAILURE: NO KNOWN HISTORY.      VHD: No significant VHD noted  ECHO 3/2022     Left ventricular function and size is normal, EF is estimated at 55-60%.   Mild left ventricular hypertrophy.   Grade I diastolic dysfunction.   No regional wall motion abnormalities were detected.   Mildly dilated left atrium.   Mild mitral , tricuspid and pulmonic regurgitation is present.   RVSP is 23 mmHg.   No evidence of pericardial effusion.     DYSLIPIDEMIA: Patient's profile is at / near Goal.yes,                                 HDL is low                                Tolerating current medical regimen well yes, takes Lipitor                                                              See most recent Lab values in Labs section above.   Diabetes mellitis: .yes,                                      Managed by family MD                                     BS under good control yes,                                      Hgb A1c avilable yes,      CAROTID ARTERY DISEASE: yes, mild               No symptoms described pertaining to Carotid artery disease               Up to date on non invasive testing .yes,                Patient is on Guidelines recommended therapy.yes,       ARRHYTHMIAS: None known     TESTS ORDERED: None this visit     PREVIOUSLY ORDERED TESTS REVIEWED & DISCUSSED WITH

## 2024-08-16 ENCOUNTER — TELEPHONE (OUTPATIENT)
Dept: CARDIOLOGY CLINIC | Age: 69
End: 2024-08-16

## 2024-08-16 DIAGNOSIS — R00.1 BRADYCARDIA: Primary | ICD-10-CM

## 2024-08-16 NOTE — TELEPHONE ENCOUNTER
Patient had 15-20 minute episode of HR at 40 during which he was dizzy on rising. After it pasted HR 70-80. Will discuss with Dr Maynard

## 2024-09-06 ENCOUNTER — HOSPITAL ENCOUNTER (OUTPATIENT)
Age: 69
Discharge: HOME | End: 2024-09-06
Payer: MEDICARE

## 2024-09-06 ENCOUNTER — INITIAL CONSULT (OUTPATIENT)
Dept: CARDIOLOGY CLINIC | Age: 69
End: 2024-09-06
Payer: MEDICARE

## 2024-09-06 ENCOUNTER — TELEPHONE (OUTPATIENT)
Dept: CARDIOLOGY CLINIC | Age: 69
End: 2024-09-06

## 2024-09-06 ENCOUNTER — HOSPITAL ENCOUNTER (OUTPATIENT)
Dept: GENERAL RADIOLOGY | Age: 69
Discharge: HOME | End: 2024-09-06
Payer: MEDICARE

## 2024-09-06 ENCOUNTER — CLINICAL DOCUMENTATION (OUTPATIENT)
Dept: CARDIOLOGY CLINIC | Age: 69
End: 2024-09-06

## 2024-09-06 VITALS
WEIGHT: 155.6 LBS | DIASTOLIC BLOOD PRESSURE: 74 MMHG | BODY MASS INDEX: 25.01 KG/M2 | SYSTOLIC BLOOD PRESSURE: 122 MMHG | HEART RATE: 77 BPM | HEIGHT: 66 IN

## 2024-09-06 DIAGNOSIS — R00.1 BRADYCARDIA: ICD-10-CM

## 2024-09-06 DIAGNOSIS — I49.5 SICK SINUS SYNDROME (HCC): Primary | ICD-10-CM

## 2024-09-06 DIAGNOSIS — Z01.810 PRE-OPERATIVE CARDIOVASCULAR EXAMINATION: ICD-10-CM

## 2024-09-06 LAB
ANION GAP SERPL CALCULATED.3IONS-SCNC: 12 MMOL/L (ref 7–16)
APTT: 28.9 SECONDS (ref 25.1–37.1)
BUN SERPL-MCNC: 38 MG/DL (ref 6–23)
CALCIUM SERPL-MCNC: 9.6 MG/DL (ref 8.3–10.6)
CHLORIDE BLD-SCNC: 102 MMOL/L (ref 99–110)
CO2: 22 MMOL/L (ref 21–32)
CREAT SERPL-MCNC: 1.5 MG/DL (ref 0.9–1.3)
GFR, ESTIMATED: 50 ML/MIN/1.73M2
GLUCOSE SERPL-MCNC: 132 MG/DL (ref 70–99)
HCT VFR BLD CALC: 42.1 % (ref 42–52)
HEMOGLOBIN: 13.7 GM/DL (ref 13.5–18)
INR BLD: 0.9 INDEX
MAGNESIUM: 2 MG/DL (ref 1.8–2.4)
MCH RBC QN AUTO: 32.7 PG (ref 27–31)
MCHC RBC AUTO-ENTMCNC: 32.5 % (ref 32–36)
MCV RBC AUTO: 100.5 FL (ref 78–100)
PDW BLD-RTO: 11.7 % (ref 11.7–14.9)
PHOSPHORUS: 3.6 MG/DL (ref 2.5–4.9)
PLATELET # BLD: 208 K/CU MM (ref 140–440)
PMV BLD AUTO: 13.1 FL (ref 7.5–11.1)
POTASSIUM SERPL-SCNC: 5.6 MMOL/L (ref 3.5–5.1)
PROTHROMBIN TIME: 12.8 SECONDS (ref 11.7–14.5)
RBC # BLD: 4.19 M/CU MM (ref 4.6–6.2)
SODIUM BLD-SCNC: 136 MMOL/L (ref 135–145)
WBC # BLD: 8.7 K/CU MM (ref 4–10.5)

## 2024-09-06 PROCEDURE — 71046 X-RAY EXAM CHEST 2 VIEWS: CPT

## 2024-09-06 PROCEDURE — 36415 COLL VENOUS BLD VENIPUNCTURE: CPT

## 2024-09-06 PROCEDURE — 3017F COLORECTAL CA SCREEN DOC REV: CPT | Performed by: INTERNAL MEDICINE

## 2024-09-06 PROCEDURE — 85610 PROTHROMBIN TIME: CPT

## 2024-09-06 PROCEDURE — 3074F SYST BP LT 130 MM HG: CPT | Performed by: INTERNAL MEDICINE

## 2024-09-06 PROCEDURE — 85730 THROMBOPLASTIN TIME PARTIAL: CPT

## 2024-09-06 PROCEDURE — 83735 ASSAY OF MAGNESIUM: CPT

## 2024-09-06 PROCEDURE — 3078F DIAST BP <80 MM HG: CPT | Performed by: INTERNAL MEDICINE

## 2024-09-06 PROCEDURE — 99204 OFFICE O/P NEW MOD 45 MIN: CPT | Performed by: INTERNAL MEDICINE

## 2024-09-06 PROCEDURE — 85027 COMPLETE CBC AUTOMATED: CPT

## 2024-09-06 PROCEDURE — 93000 ELECTROCARDIOGRAM COMPLETE: CPT | Performed by: INTERNAL MEDICINE

## 2024-09-06 PROCEDURE — 84100 ASSAY OF PHOSPHORUS: CPT

## 2024-09-06 PROCEDURE — 1036F TOBACCO NON-USER: CPT | Performed by: INTERNAL MEDICINE

## 2024-09-06 PROCEDURE — G8417 CALC BMI ABV UP PARAM F/U: HCPCS | Performed by: INTERNAL MEDICINE

## 2024-09-06 PROCEDURE — 80048 BASIC METABOLIC PNL TOTAL CA: CPT

## 2024-09-06 PROCEDURE — 1123F ACP DISCUSS/DSCN MKR DOCD: CPT | Performed by: INTERNAL MEDICINE

## 2024-09-06 PROCEDURE — G8427 DOCREV CUR MEDS BY ELIG CLIN: HCPCS | Performed by: INTERNAL MEDICINE

## 2024-09-06 NOTE — PROGRESS NOTES
Patient's monitor does reveal brief episodes of atrial flutter and also long pauses one of them being more than 4-second and another 3.8 seconds.  Patient is on carvedilol which will be held and he will be referred to Dr. CHING for consideration of permanent pacemaker.  Patient has complaints of dizziness but no syncope.

## 2024-09-06 NOTE — TELEPHONE ENCOUNTER
Called pt, asked if he has sleep apnea he said no, Dr Maynard checked med list and referred to . Pt has uri today

## 2024-09-09 ENCOUNTER — TELEPHONE (OUTPATIENT)
Dept: CARDIOLOGY CLINIC | Age: 69
End: 2024-09-09

## 2024-09-09 DIAGNOSIS — I49.5 SICK SINUS SYNDROME (HCC): Primary | ICD-10-CM

## 2024-09-09 ASSESSMENT — ENCOUNTER SYMPTOMS
WHEEZING: 0
COUGH: 0
NAUSEA: 0
EYE PAIN: 0
CHEST TIGHTNESS: 0
DIARRHEA: 0
CONSTIPATION: 0
COLOR CHANGE: 0
BACK PAIN: 0
BLOOD IN STOOL: 0
ABDOMINAL PAIN: 0
PHOTOPHOBIA: 0
VOMITING: 0
SHORTNESS OF BREATH: 0

## 2024-09-11 ENCOUNTER — APPOINTMENT (OUTPATIENT)
Dept: CT IMAGING | Age: 69
DRG: 983 | End: 2024-09-11
Payer: MEDICARE

## 2024-09-11 ENCOUNTER — TELEPHONE (OUTPATIENT)
Dept: CARDIOLOGY CLINIC | Age: 69
End: 2024-09-11

## 2024-09-11 ENCOUNTER — HOSPITAL ENCOUNTER (INPATIENT)
Age: 69
LOS: 2 days | Discharge: HOME | DRG: 983 | End: 2024-09-13
Attending: STUDENT IN AN ORGANIZED HEALTH CARE EDUCATION/TRAINING PROGRAM | Admitting: STUDENT IN AN ORGANIZED HEALTH CARE EDUCATION/TRAINING PROGRAM
Payer: MEDICARE

## 2024-09-11 DIAGNOSIS — N17.9 AKI (ACUTE KIDNEY INJURY) (HCC): Primary | ICD-10-CM

## 2024-09-11 DIAGNOSIS — R79.0 LOW MAGNESIUM LEVEL: ICD-10-CM

## 2024-09-11 DIAGNOSIS — R94.31 ABNORMAL EKG: ICD-10-CM

## 2024-09-11 DIAGNOSIS — I49.5 SICK SINUS SYNDROME (HCC): ICD-10-CM

## 2024-09-11 LAB
ALBUMIN SERPL-MCNC: 4.5 G/DL (ref 3.4–5)
ALBUMIN/GLOB SERPL: 2.2 {RATIO} (ref 1.1–2.2)
ALP SERPL-CCNC: 56 U/L (ref 40–129)
ALT SERPL-CCNC: 28 U/L (ref 10–40)
ANION GAP SERPL CALCULATED.3IONS-SCNC: 12 MMOL/L (ref 9–17)
AST SERPL-CCNC: 27 U/L (ref 15–37)
BASOPHILS # BLD: 0.05 K/UL
BASOPHILS NFR BLD: 1 % (ref 0–1)
BILIRUB SERPL-MCNC: 0.3 MG/DL (ref 0–1)
BUN SERPL-MCNC: 30 MG/DL (ref 7–20)
CALCIUM SERPL-MCNC: 9.6 MG/DL (ref 8.3–10.6)
CHLORIDE SERPL-SCNC: 104 MMOL/L (ref 99–110)
CO2 SERPL-SCNC: 23 MMOL/L (ref 21–32)
CREAT SERPL-MCNC: 1.7 MG/DL (ref 0.8–1.3)
EOSINOPHIL # BLD: 0.16 K/UL
EOSINOPHILS RELATIVE PERCENT: 2 % (ref 0–3)
ERYTHROCYTE [DISTWIDTH] IN BLOOD BY AUTOMATED COUNT: 11.6 % (ref 11.7–14.9)
GFR, ESTIMATED: 40 ML/MIN/1.73M2
GLUCOSE BLD-MCNC: 218 MG/DL (ref 74–99)
GLUCOSE SERPL-MCNC: 124 MG/DL (ref 74–99)
HCT VFR BLD AUTO: 39 % (ref 42–52)
HGB BLD-MCNC: 13 G/DL (ref 13.5–18)
IMM GRANULOCYTES # BLD AUTO: 0.04 K/UL
IMM GRANULOCYTES NFR BLD: 0 %
LYMPHOCYTES NFR BLD: 2.47 K/UL
LYMPHOCYTES RELATIVE PERCENT: 28 % (ref 24–44)
MAGNESIUM SERPL-MCNC: 1.7 MG/DL (ref 1.8–2.4)
MCH RBC QN AUTO: 32.3 PG (ref 27–31)
MCHC RBC AUTO-ENTMCNC: 33.3 G/DL (ref 32–36)
MCV RBC AUTO: 96.8 FL (ref 78–100)
MONOCYTES NFR BLD: 0.86 K/UL
MONOCYTES NFR BLD: 10 % (ref 0–4)
NEUTROPHILS NFR BLD: 60 % (ref 36–66)
NEUTS SEG NFR BLD: 5.39 K/UL
PLATELET # BLD AUTO: 218 K/UL (ref 140–440)
PMV BLD AUTO: 10.9 FL (ref 7.5–11.1)
POTASSIUM SERPL-SCNC: 4.9 MMOL/L (ref 3.5–5.1)
PROT SERPL-MCNC: 6.5 G/DL (ref 6.4–8.2)
RBC # BLD AUTO: 4.03 M/UL (ref 4.6–6.2)
SODIUM SERPL-SCNC: 139 MMOL/L (ref 136–145)
WBC OTHER # BLD: 9 K/UL (ref 4–10.5)

## 2024-09-11 PROCEDURE — 2580000003 HC RX 258: Performed by: STUDENT IN AN ORGANIZED HEALTH CARE EDUCATION/TRAINING PROGRAM

## 2024-09-11 PROCEDURE — 96374 THER/PROPH/DIAG INJ IV PUSH: CPT

## 2024-09-11 PROCEDURE — 99285 EMERGENCY DEPT VISIT HI MDM: CPT

## 2024-09-11 PROCEDURE — 1200000000 HC SEMI PRIVATE

## 2024-09-11 PROCEDURE — 93005 ELECTROCARDIOGRAM TRACING: CPT | Performed by: STUDENT IN AN ORGANIZED HEALTH CARE EDUCATION/TRAINING PROGRAM

## 2024-09-11 PROCEDURE — G0378 HOSPITAL OBSERVATION PER HR: HCPCS

## 2024-09-11 PROCEDURE — 80053 COMPREHEN METABOLIC PANEL: CPT

## 2024-09-11 PROCEDURE — 83735 ASSAY OF MAGNESIUM: CPT

## 2024-09-11 PROCEDURE — 85025 COMPLETE CBC W/AUTO DIFF WBC: CPT

## 2024-09-11 PROCEDURE — 6360000002 HC RX W HCPCS: Performed by: STUDENT IN AN ORGANIZED HEALTH CARE EDUCATION/TRAINING PROGRAM

## 2024-09-11 PROCEDURE — 74176 CT ABD & PELVIS W/O CONTRAST: CPT

## 2024-09-11 PROCEDURE — 82962 GLUCOSE BLOOD TEST: CPT

## 2024-09-11 PROCEDURE — 96361 HYDRATE IV INFUSION ADD-ON: CPT

## 2024-09-11 RX ORDER — MAGNESIUM SULFATE IN WATER 40 MG/ML
2000 INJECTION, SOLUTION INTRAVENOUS PRN
Status: DISCONTINUED | OUTPATIENT
Start: 2024-09-11 | End: 2024-09-12

## 2024-09-11 RX ORDER — INSULIN LISPRO 100 [IU]/ML
0-4 INJECTION, SOLUTION INTRAVENOUS; SUBCUTANEOUS NIGHTLY
Status: DISCONTINUED | OUTPATIENT
Start: 2024-09-11 | End: 2024-09-13 | Stop reason: HOSPADM

## 2024-09-11 RX ORDER — ONDANSETRON 4 MG/1
4 TABLET, ORALLY DISINTEGRATING ORAL EVERY 8 HOURS PRN
Status: DISCONTINUED | OUTPATIENT
Start: 2024-09-11 | End: 2024-09-13 | Stop reason: HOSPADM

## 2024-09-11 RX ORDER — 0.9 % SODIUM CHLORIDE 0.9 %
1000 INTRAVENOUS SOLUTION INTRAVENOUS ONCE
Status: COMPLETED | OUTPATIENT
Start: 2024-09-11 | End: 2024-09-11

## 2024-09-11 RX ORDER — HYDROCHLOROTHIAZIDE 12.5 MG/1
12.5 TABLET ORAL DAILY
Status: DISCONTINUED | OUTPATIENT
Start: 2024-09-12 | End: 2024-09-13 | Stop reason: HOSPADM

## 2024-09-11 RX ORDER — ACETAMINOPHEN 325 MG/1
650 TABLET ORAL EVERY 6 HOURS PRN
Status: DISCONTINUED | OUTPATIENT
Start: 2024-09-11 | End: 2024-09-13 | Stop reason: HOSPADM

## 2024-09-11 RX ORDER — ACETAMINOPHEN 650 MG/1
650 SUPPOSITORY RECTAL EVERY 6 HOURS PRN
Status: DISCONTINUED | OUTPATIENT
Start: 2024-09-11 | End: 2024-09-13 | Stop reason: HOSPADM

## 2024-09-11 RX ORDER — ENOXAPARIN SODIUM 100 MG/ML
40 INJECTION SUBCUTANEOUS EVERY EVENING
Status: DISCONTINUED | OUTPATIENT
Start: 2024-09-12 | End: 2024-09-13 | Stop reason: HOSPADM

## 2024-09-11 RX ORDER — LISINOPRIL 20 MG/1
40 TABLET ORAL DAILY
Status: DISCONTINUED | OUTPATIENT
Start: 2024-09-12 | End: 2024-09-13 | Stop reason: HOSPADM

## 2024-09-11 RX ORDER — SODIUM CHLORIDE 0.9 % (FLUSH) 0.9 %
5-40 SYRINGE (ML) INJECTION EVERY 12 HOURS SCHEDULED
Status: DISCONTINUED | OUTPATIENT
Start: 2024-09-11 | End: 2024-09-13 | Stop reason: HOSPADM

## 2024-09-11 RX ORDER — MAGNESIUM SULFATE 1 G/100ML
1000 INJECTION INTRAVENOUS ONCE
Status: COMPLETED | OUTPATIENT
Start: 2024-09-11 | End: 2024-09-11

## 2024-09-11 RX ORDER — INSULIN LISPRO 100 [IU]/ML
0-4 INJECTION, SOLUTION INTRAVENOUS; SUBCUTANEOUS
Status: DISCONTINUED | OUTPATIENT
Start: 2024-09-12 | End: 2024-09-13 | Stop reason: HOSPADM

## 2024-09-11 RX ORDER — ATORVASTATIN CALCIUM 40 MG/1
40 TABLET, FILM COATED ORAL NIGHTLY
Status: DISCONTINUED | OUTPATIENT
Start: 2024-09-11 | End: 2024-09-13 | Stop reason: HOSPADM

## 2024-09-11 RX ORDER — ASPIRIN 81 MG/1
81 TABLET, CHEWABLE ORAL DAILY
Status: DISCONTINUED | OUTPATIENT
Start: 2024-09-12 | End: 2024-09-13 | Stop reason: HOSPADM

## 2024-09-11 RX ORDER — SODIUM CHLORIDE 9 MG/ML
INJECTION, SOLUTION INTRAVENOUS CONTINUOUS
Status: DISCONTINUED | OUTPATIENT
Start: 2024-09-11 | End: 2024-09-11

## 2024-09-11 RX ORDER — SODIUM CHLORIDE 9 MG/ML
INJECTION, SOLUTION INTRAVENOUS PRN
Status: DISCONTINUED | OUTPATIENT
Start: 2024-09-11 | End: 2024-09-12 | Stop reason: SDUPTHER

## 2024-09-11 RX ORDER — SODIUM CHLORIDE 0.9 % (FLUSH) 0.9 %
5-40 SYRINGE (ML) INJECTION PRN
Status: DISCONTINUED | OUTPATIENT
Start: 2024-09-11 | End: 2024-09-13 | Stop reason: HOSPADM

## 2024-09-11 RX ORDER — GLUCAGON 1 MG/ML
1 KIT INJECTION PRN
Status: DISCONTINUED | OUTPATIENT
Start: 2024-09-11 | End: 2024-09-13 | Stop reason: HOSPADM

## 2024-09-11 RX ORDER — CARVEDILOL 25 MG/1
25 TABLET ORAL 2 TIMES DAILY WITH MEALS
Status: DISCONTINUED | OUTPATIENT
Start: 2024-09-11 | End: 2024-09-13 | Stop reason: HOSPADM

## 2024-09-11 RX ORDER — POTASSIUM CHLORIDE 7.45 MG/ML
10 INJECTION INTRAVENOUS PRN
Status: DISCONTINUED | OUTPATIENT
Start: 2024-09-11 | End: 2024-09-12

## 2024-09-11 RX ORDER — SODIUM CHLORIDE, SODIUM LACTATE, POTASSIUM CHLORIDE, CALCIUM CHLORIDE 600; 310; 30; 20 MG/100ML; MG/100ML; MG/100ML; MG/100ML
INJECTION, SOLUTION INTRAVENOUS CONTINUOUS
Status: ACTIVE | OUTPATIENT
Start: 2024-09-11 | End: 2024-09-12

## 2024-09-11 RX ORDER — LANOLIN ALCOHOL/MO/W.PET/CERES
3 CREAM (GRAM) TOPICAL NIGHTLY PRN
Status: DISCONTINUED | OUTPATIENT
Start: 2024-09-11 | End: 2024-09-13 | Stop reason: HOSPADM

## 2024-09-11 RX ORDER — POLYETHYLENE GLYCOL 3350 17 G/17G
17 POWDER, FOR SOLUTION ORAL DAILY PRN
Status: DISCONTINUED | OUTPATIENT
Start: 2024-09-11 | End: 2024-09-13 | Stop reason: HOSPADM

## 2024-09-11 RX ORDER — SPIRONOLACTONE 50 MG/1
25 TABLET, FILM COATED ORAL 2 TIMES DAILY
Status: DISCONTINUED | OUTPATIENT
Start: 2024-09-11 | End: 2024-09-13 | Stop reason: HOSPADM

## 2024-09-11 RX ORDER — DEXTROSE MONOHYDRATE 100 MG/ML
INJECTION, SOLUTION INTRAVENOUS CONTINUOUS PRN
Status: DISCONTINUED | OUTPATIENT
Start: 2024-09-11 | End: 2024-09-13 | Stop reason: HOSPADM

## 2024-09-11 RX ORDER — ONDANSETRON 2 MG/ML
4 INJECTION INTRAMUSCULAR; INTRAVENOUS EVERY 6 HOURS PRN
Status: DISCONTINUED | OUTPATIENT
Start: 2024-09-11 | End: 2024-09-13 | Stop reason: HOSPADM

## 2024-09-11 RX ADMIN — SODIUM CHLORIDE 1000 ML: 9 INJECTION, SOLUTION INTRAVENOUS at 18:05

## 2024-09-11 RX ADMIN — MAGNESIUM SULFATE HEPTAHYDRATE 1000 MG: 1 INJECTION, SOLUTION INTRAVENOUS at 20:36

## 2024-09-11 ASSESSMENT — PAIN SCALES - GENERAL: PAINLEVEL_OUTOF10: 0

## 2024-09-11 ASSESSMENT — PAIN - FUNCTIONAL ASSESSMENT: PAIN_FUNCTIONAL_ASSESSMENT: NONE - DENIES PAIN

## 2024-09-12 ENCOUNTER — APPOINTMENT (OUTPATIENT)
Dept: GENERAL RADIOLOGY | Age: 69
DRG: 983 | End: 2024-09-12
Payer: MEDICARE

## 2024-09-12 PROBLEM — I44.1 MOBITZ TYPE II ATRIOVENTRICULAR BLOCK: Status: ACTIVE | Noted: 2024-09-12

## 2024-09-12 LAB
ALBUMIN SERPL-MCNC: 3.8 G/DL (ref 3.4–5)
ALBUMIN/GLOB SERPL: 2.2 {RATIO} (ref 1.1–2.2)
ALP SERPL-CCNC: 49 U/L (ref 40–129)
ALT SERPL-CCNC: 28 U/L (ref 10–40)
ANION GAP SERPL CALCULATED.3IONS-SCNC: 12 MMOL/L (ref 9–17)
AST SERPL-CCNC: 27 U/L (ref 15–37)
BASOPHILS # BLD: 0.03 K/UL
BASOPHILS NFR BLD: 1 % (ref 0–1)
BILIRUB SERPL-MCNC: 0.3 MG/DL (ref 0–1)
BILIRUB UR QL STRIP: NEGATIVE
BUN SERPL-MCNC: 25 MG/DL (ref 7–20)
CALCIUM SERPL-MCNC: 9 MG/DL (ref 8.3–10.6)
CHLORIDE SERPL-SCNC: 107 MMOL/L (ref 99–110)
CLARITY UR: CLEAR
CO2 SERPL-SCNC: 20 MMOL/L (ref 21–32)
COLOR UR: YELLOW
COMMENT: NORMAL
CREAT SERPL-MCNC: 1.2 MG/DL (ref 0.8–1.3)
ECHO BSA: 1.78 M2
EKG ATRIAL RATE: 93 BPM
EKG DIAGNOSIS: NORMAL
EKG P AXIS: 62 DEGREES
EKG P-R INTERVAL: 166 MS
EKG Q-T INTERVAL: 354 MS
EKG QRS DURATION: 128 MS
EKG QTC CALCULATION (BAZETT): 440 MS
EKG R AXIS: -60 DEGREES
EKG T AXIS: 20 DEGREES
EKG VENTRICULAR RATE: 93 BPM
EOSINOPHIL # BLD: 0.25 K/UL
EOSINOPHILS RELATIVE PERCENT: 4 % (ref 0–3)
ERYTHROCYTE [DISTWIDTH] IN BLOOD BY AUTOMATED COUNT: 11.7 % (ref 11.7–14.9)
FOLATE SERPL-MCNC: 27.9 NG/ML (ref 4.8–24.2)
GFR, ESTIMATED: 59 ML/MIN/1.73M2
GLUCOSE BLD-MCNC: 121 MG/DL (ref 74–99)
GLUCOSE BLD-MCNC: 138 MG/DL (ref 74–99)
GLUCOSE BLD-MCNC: 184 MG/DL (ref 74–99)
GLUCOSE BLD-MCNC: 222 MG/DL (ref 74–99)
GLUCOSE SERPL-MCNC: 111 MG/DL (ref 74–99)
GLUCOSE UR STRIP-MCNC: NEGATIVE MG/DL
HCT VFR BLD AUTO: 35.4 % (ref 42–52)
HGB BLD-MCNC: 11.9 G/DL (ref 13.5–18)
HGB UR QL STRIP.AUTO: NEGATIVE
IMM GRANULOCYTES # BLD AUTO: 0.03 K/UL
IMM GRANULOCYTES NFR BLD: 1 %
INR PPP: 1.1
KETONES UR STRIP-MCNC: NEGATIVE MG/DL
LEUKOCYTE ESTERASE UR QL STRIP: NEGATIVE
LYMPHOCYTES NFR BLD: 2.12 K/UL
LYMPHOCYTES RELATIVE PERCENT: 32 % (ref 24–44)
MAGNESIUM SERPL-MCNC: 1.8 MG/DL (ref 1.8–2.4)
MCH RBC QN AUTO: 32.7 PG (ref 27–31)
MCHC RBC AUTO-ENTMCNC: 33.6 G/DL (ref 32–36)
MCV RBC AUTO: 97.3 FL (ref 78–100)
MONOCYTES NFR BLD: 0.75 K/UL
MONOCYTES NFR BLD: 11 % (ref 0–4)
NEUTROPHILS NFR BLD: 52 % (ref 36–66)
NEUTS SEG NFR BLD: 3.48 K/UL
NITRITE UR QL STRIP: NEGATIVE
PH UR STRIP: 6 [PH] (ref 5–8)
PLATELET # BLD AUTO: 176 K/UL (ref 140–440)
PMV BLD AUTO: 11.4 FL (ref 7.5–11.1)
POTASSIUM SERPL-SCNC: 4.6 MMOL/L (ref 3.5–5.1)
PROT SERPL-MCNC: 5.6 G/DL (ref 6.4–8.2)
PROT UR STRIP-MCNC: NEGATIVE MG/DL
PROTHROMBIN TIME: 14.1 SEC (ref 11.7–14.5)
RBC # BLD AUTO: 3.64 M/UL (ref 4.6–6.2)
SODIUM SERPL-SCNC: 139 MMOL/L (ref 136–145)
SP GR UR STRIP: 1.01 (ref 1–1.03)
TSH SERPL DL<=0.05 MIU/L-ACNC: 1.43 UIU/ML (ref 0.27–4.2)
UROBILINOGEN UR STRIP-ACNC: 0.2 EU/DL (ref 0–1)
VIT B12 SERPL-MCNC: 680 PG/ML (ref 211–911)
WBC OTHER # BLD: 6.7 K/UL (ref 4–10.5)

## 2024-09-12 PROCEDURE — 2500000003 HC RX 250 WO HCPCS: Performed by: INTERNAL MEDICINE

## 2024-09-12 PROCEDURE — 82962 GLUCOSE BLOOD TEST: CPT

## 2024-09-12 PROCEDURE — 82607 VITAMIN B-12: CPT

## 2024-09-12 PROCEDURE — 2580000003 HC RX 258: Performed by: INTERNAL MEDICINE

## 2024-09-12 PROCEDURE — G0378 HOSPITAL OBSERVATION PER HR: HCPCS

## 2024-09-12 PROCEDURE — 84443 ASSAY THYROID STIM HORMONE: CPT

## 2024-09-12 PROCEDURE — 99223 1ST HOSP IP/OBS HIGH 75: CPT | Performed by: INTERNAL MEDICINE

## 2024-09-12 PROCEDURE — 80053 COMPREHEN METABOLIC PANEL: CPT

## 2024-09-12 PROCEDURE — 6360000004 HC RX CONTRAST MEDICATION: Performed by: INTERNAL MEDICINE

## 2024-09-12 PROCEDURE — 85025 COMPLETE CBC W/AUTO DIFF WBC: CPT

## 2024-09-12 PROCEDURE — 85610 PROTHROMBIN TIME: CPT

## 2024-09-12 PROCEDURE — 02H63JZ INSERTION OF PACEMAKER LEAD INTO RIGHT ATRIUM, PERCUTANEOUS APPROACH: ICD-10-PCS | Performed by: INTERNAL MEDICINE

## 2024-09-12 PROCEDURE — 02HK3JZ INSERTION OF PACEMAKER LEAD INTO RIGHT VENTRICLE, PERCUTANEOUS APPROACH: ICD-10-PCS | Performed by: INTERNAL MEDICINE

## 2024-09-12 PROCEDURE — 6370000000 HC RX 637 (ALT 250 FOR IP): Performed by: STUDENT IN AN ORGANIZED HEALTH CARE EDUCATION/TRAINING PROGRAM

## 2024-09-12 PROCEDURE — 2580000003 HC RX 258: Performed by: NURSE PRACTITIONER

## 2024-09-12 PROCEDURE — C1785 PMKR, DUAL, RATE-RESP: HCPCS | Performed by: INTERNAL MEDICINE

## 2024-09-12 PROCEDURE — 81003 URINALYSIS AUTO W/O SCOPE: CPT

## 2024-09-12 PROCEDURE — 83735 ASSAY OF MAGNESIUM: CPT

## 2024-09-12 PROCEDURE — 93010 ELECTROCARDIOGRAM REPORT: CPT | Performed by: INTERNAL MEDICINE

## 2024-09-12 PROCEDURE — 6360000002 HC RX W HCPCS: Performed by: NURSE PRACTITIONER

## 2024-09-12 PROCEDURE — 6360000002 HC RX W HCPCS: Performed by: INTERNAL MEDICINE

## 2024-09-12 PROCEDURE — 2709999900 HC NON-CHARGEABLE SUPPLY: Performed by: INTERNAL MEDICINE

## 2024-09-12 PROCEDURE — 6360000002 HC RX W HCPCS

## 2024-09-12 PROCEDURE — 2580000003 HC RX 258: Performed by: STUDENT IN AN ORGANIZED HEALTH CARE EDUCATION/TRAINING PROGRAM

## 2024-09-12 PROCEDURE — 33208 INSRT HEART PM ATRIAL & VENT: CPT | Performed by: INTERNAL MEDICINE

## 2024-09-12 PROCEDURE — C1898 LEAD, PMKR, OTHER THAN TRANS: HCPCS | Performed by: INTERNAL MEDICINE

## 2024-09-12 PROCEDURE — C1892 INTRO/SHEATH,FIXED,PEEL-AWAY: HCPCS | Performed by: INTERNAL MEDICINE

## 2024-09-12 PROCEDURE — 71045 X-RAY EXAM CHEST 1 VIEW: CPT

## 2024-09-12 PROCEDURE — 0JH606Z INSERTION OF PACEMAKER, DUAL CHAMBER INTO CHEST SUBCUTANEOUS TISSUE AND FASCIA, OPEN APPROACH: ICD-10-PCS | Performed by: INTERNAL MEDICINE

## 2024-09-12 PROCEDURE — 2060000000 HC ICU INTERMEDIATE R&B

## 2024-09-12 PROCEDURE — 36415 COLL VENOUS BLD VENIPUNCTURE: CPT

## 2024-09-12 PROCEDURE — 82746 ASSAY OF FOLIC ACID SERUM: CPT

## 2024-09-12 DEVICE — LEAD 5076-58 MRI US RCMCRD
Type: IMPLANTABLE DEVICE | Status: FUNCTIONAL
Brand: CAPSUREFIX NOVUS MRI™ SURESCAN®

## 2024-09-12 DEVICE — LEAD 5076-52 MRI US RCMCRD
Type: IMPLANTABLE DEVICE | Status: FUNCTIONAL
Brand: CAPSUREFIX NOVUS MRI™ SURESCAN®

## 2024-09-12 DEVICE — IPG W1DR01 AZURE XT DR MRI USA
Type: IMPLANTABLE DEVICE | Status: FUNCTIONAL
Brand: AZURE™ XT DR MRI SURESCAN™

## 2024-09-12 RX ORDER — SODIUM CHLORIDE 9 MG/ML
INJECTION, SOLUTION INTRAVENOUS PRN
Status: DISCONTINUED | OUTPATIENT
Start: 2024-09-12 | End: 2024-09-13 | Stop reason: HOSPADM

## 2024-09-12 RX ORDER — POTASSIUM CHLORIDE 1500 MG/1
40 TABLET, EXTENDED RELEASE ORAL PRN
Status: DISCONTINUED | OUTPATIENT
Start: 2024-09-12 | End: 2024-09-13 | Stop reason: HOSPADM

## 2024-09-12 RX ORDER — IOPAMIDOL 612 MG/ML
INJECTION, SOLUTION INTRAVASCULAR PRN
Status: DISCONTINUED | OUTPATIENT
Start: 2024-09-12 | End: 2024-09-12 | Stop reason: HOSPADM

## 2024-09-12 RX ORDER — SODIUM CHLORIDE 0.9 % (FLUSH) 0.9 %
5-40 SYRINGE (ML) INJECTION EVERY 12 HOURS SCHEDULED
Status: DISCONTINUED | OUTPATIENT
Start: 2024-09-12 | End: 2024-09-13 | Stop reason: HOSPADM

## 2024-09-12 RX ORDER — MAGNESIUM SULFATE IN WATER 40 MG/ML
2000 INJECTION, SOLUTION INTRAVENOUS PRN
Status: DISCONTINUED | OUTPATIENT
Start: 2024-09-12 | End: 2024-09-13 | Stop reason: HOSPADM

## 2024-09-12 RX ORDER — POTASSIUM CHLORIDE 7.45 MG/ML
10 INJECTION INTRAVENOUS PRN
Status: DISCONTINUED | OUTPATIENT
Start: 2024-09-12 | End: 2024-09-13 | Stop reason: HOSPADM

## 2024-09-12 RX ORDER — MIDAZOLAM HYDROCHLORIDE 1 MG/ML
INJECTION INTRAMUSCULAR; INTRAVENOUS PRN
Status: DISCONTINUED | OUTPATIENT
Start: 2024-09-12 | End: 2024-09-12 | Stop reason: HOSPADM

## 2024-09-12 RX ORDER — SODIUM CHLORIDE 0.9 % (FLUSH) 0.9 %
5-40 SYRINGE (ML) INJECTION PRN
Status: DISCONTINUED | OUTPATIENT
Start: 2024-09-12 | End: 2024-09-13 | Stop reason: HOSPADM

## 2024-09-12 RX ADMIN — SODIUM CHLORIDE, PRESERVATIVE FREE 10 ML: 5 INJECTION INTRAVENOUS at 00:32

## 2024-09-12 RX ADMIN — CARVEDILOL 25 MG: 25 TABLET, FILM COATED ORAL at 17:56

## 2024-09-12 RX ADMIN — VANCOMYCIN HYDROCHLORIDE 1000 MG: 1 INJECTION, POWDER, LYOPHILIZED, FOR SOLUTION INTRAVENOUS at 21:54

## 2024-09-12 RX ADMIN — SPIRONOLACTONE 25 MG: 50 TABLET ORAL at 17:56

## 2024-09-12 RX ADMIN — ATORVASTATIN CALCIUM 40 MG: 40 TABLET, FILM COATED ORAL at 21:47

## 2024-09-12 RX ADMIN — CARVEDILOL 25 MG: 25 TABLET, FILM COATED ORAL at 00:20

## 2024-09-12 RX ADMIN — ATORVASTATIN CALCIUM 40 MG: 40 TABLET, FILM COATED ORAL at 00:20

## 2024-09-12 RX ADMIN — SODIUM CHLORIDE, POTASSIUM CHLORIDE, SODIUM LACTATE AND CALCIUM CHLORIDE: 600; 310; 30; 20 INJECTION, SOLUTION INTRAVENOUS at 00:36

## 2024-09-12 RX ADMIN — ACETAMINOPHEN 650 MG: 325 TABLET ORAL at 17:59

## 2024-09-12 RX ADMIN — SODIUM CHLORIDE, PRESERVATIVE FREE 10 ML: 5 INJECTION INTRAVENOUS at 00:35

## 2024-09-12 ASSESSMENT — ENCOUNTER SYMPTOMS
DIARRHEA: 0
COUGH: 0
EYE PAIN: 0
PHOTOPHOBIA: 0
ABDOMINAL PAIN: 0
COLOR CHANGE: 0
CHEST TIGHTNESS: 0
NAUSEA: 0
BACK PAIN: 0
WHEEZING: 0
BLOOD IN STOOL: 0
CONSTIPATION: 0
VOMITING: 0
SHORTNESS OF BREATH: 0

## 2024-09-12 ASSESSMENT — PAIN SCALES - GENERAL: PAINLEVEL_OUTOF10: 4

## 2024-09-12 ASSESSMENT — PAIN DESCRIPTION - LOCATION: LOCATION: CHEST

## 2024-09-12 ASSESSMENT — PAIN DESCRIPTION - ORIENTATION: ORIENTATION: LEFT

## 2024-09-13 ENCOUNTER — TELEPHONE (OUTPATIENT)
Dept: CARDIOLOGY CLINIC | Age: 69
End: 2024-09-13

## 2024-09-13 VITALS
WEIGHT: 150 LBS | RESPIRATION RATE: 15 BRPM | HEART RATE: 79 BPM | DIASTOLIC BLOOD PRESSURE: 80 MMHG | BODY MASS INDEX: 24.11 KG/M2 | TEMPERATURE: 98.2 F | OXYGEN SATURATION: 98 % | HEIGHT: 66 IN | SYSTOLIC BLOOD PRESSURE: 134 MMHG

## 2024-09-13 LAB — GLUCOSE BLD-MCNC: 190 MG/DL (ref 74–99)

## 2024-09-13 PROCEDURE — 6370000000 HC RX 637 (ALT 250 FOR IP): Performed by: STUDENT IN AN ORGANIZED HEALTH CARE EDUCATION/TRAINING PROGRAM

## 2024-09-13 PROCEDURE — 94761 N-INVAS EAR/PLS OXIMETRY MLT: CPT

## 2024-09-13 PROCEDURE — 2580000003 HC RX 258: Performed by: STUDENT IN AN ORGANIZED HEALTH CARE EDUCATION/TRAINING PROGRAM

## 2024-09-13 PROCEDURE — 82962 GLUCOSE BLOOD TEST: CPT

## 2024-09-13 PROCEDURE — G0378 HOSPITAL OBSERVATION PER HR: HCPCS

## 2024-09-13 RX ADMIN — CARVEDILOL 25 MG: 25 TABLET, FILM COATED ORAL at 08:33

## 2024-09-13 RX ADMIN — SODIUM CHLORIDE, PRESERVATIVE FREE 10 ML: 5 INJECTION INTRAVENOUS at 08:33

## 2024-09-13 RX ADMIN — HYDROCHLOROTHIAZIDE 12.5 MG: 12.5 TABLET ORAL at 08:32

## 2024-09-13 RX ADMIN — LISINOPRIL 40 MG: 20 TABLET ORAL at 08:33

## 2024-09-13 RX ADMIN — ASPIRIN 81 MG: 81 TABLET, CHEWABLE ORAL at 08:32

## 2024-09-13 RX ADMIN — ACETAMINOPHEN 650 MG: 325 TABLET ORAL at 08:33

## 2024-09-13 RX ADMIN — SPIRONOLACTONE 25 MG: 50 TABLET ORAL at 08:33

## 2024-09-13 ASSESSMENT — PAIN DESCRIPTION - ORIENTATION
ORIENTATION: LEFT

## 2024-09-13 ASSESSMENT — PAIN SCALES - GENERAL
PAINLEVEL_OUTOF10: 2
PAINLEVEL_OUTOF10: 4
PAINLEVEL_OUTOF10: 4

## 2024-09-13 ASSESSMENT — PAIN DESCRIPTION - LOCATION
LOCATION: CHEST

## 2024-09-17 ENCOUNTER — TELEPHONE (OUTPATIENT)
Dept: CARDIOLOGY CLINIC | Age: 69
End: 2024-09-17

## 2024-09-23 ENCOUNTER — NURSE ONLY (OUTPATIENT)
Dept: CARDIOLOGY CLINIC | Age: 69
End: 2024-09-23

## 2024-09-23 VITALS — TEMPERATURE: 97.4 F

## 2024-09-23 DIAGNOSIS — Z95.0 STATUS POST PLACEMENT OF CARDIAC PACEMAKER: Primary | ICD-10-CM

## 2024-09-23 PROCEDURE — 99024 POSTOP FOLLOW-UP VISIT: CPT | Performed by: INTERNAL MEDICINE

## 2024-09-25 ENCOUNTER — TELEPHONE (OUTPATIENT)
Dept: CARDIOLOGY CLINIC | Age: 69
End: 2024-09-25

## 2024-09-25 DIAGNOSIS — I10 PRIMARY HYPERTENSION: ICD-10-CM

## 2024-09-25 DIAGNOSIS — I25.810 CORONARY ARTERY DISEASE INVOLVING CORONARY BYPASS GRAFT OF NATIVE HEART WITHOUT ANGINA PECTORIS: Primary | ICD-10-CM

## 2024-09-26 ENCOUNTER — HOSPITAL ENCOUNTER (OUTPATIENT)
Age: 69
Discharge: HOME | End: 2024-09-26
Payer: MEDICARE

## 2024-09-26 DIAGNOSIS — I25.810 CORONARY ARTERY DISEASE INVOLVING CORONARY BYPASS GRAFT OF NATIVE HEART WITHOUT ANGINA PECTORIS: ICD-10-CM

## 2024-09-26 DIAGNOSIS — I10 PRIMARY HYPERTENSION: ICD-10-CM

## 2024-09-26 LAB
ERYTHROCYTE [DISTWIDTH] IN BLOOD BY AUTOMATED COUNT: 11.9 % (ref 11.7–14.9)
HCT VFR BLD AUTO: 40.2 % (ref 42–52)
HGB BLD-MCNC: 13.4 G/DL (ref 13.5–18)
MCH RBC QN AUTO: 32.5 PG (ref 27–31)
MCHC RBC AUTO-ENTMCNC: 33.3 G/DL (ref 32–36)
MCV RBC AUTO: 97.6 FL (ref 78–100)
PLATELET # BLD AUTO: 229 K/UL (ref 140–440)
PMV BLD AUTO: 12.1 FL (ref 7.5–11.1)
RBC # BLD AUTO: 4.12 M/UL (ref 4.6–6.2)
WBC OTHER # BLD: 11.1 K/UL (ref 4–10.5)

## 2024-09-26 PROCEDURE — 85027 COMPLETE CBC AUTOMATED: CPT

## 2024-09-26 PROCEDURE — 36415 COLL VENOUS BLD VENIPUNCTURE: CPT

## 2024-10-10 ENCOUNTER — OFFICE VISIT (OUTPATIENT)
Dept: CARDIOLOGY CLINIC | Age: 69
End: 2024-10-10

## 2024-10-10 VITALS
DIASTOLIC BLOOD PRESSURE: 72 MMHG | HEART RATE: 68 BPM | HEIGHT: 66 IN | WEIGHT: 163.6 LBS | BODY MASS INDEX: 26.29 KG/M2 | SYSTOLIC BLOOD PRESSURE: 122 MMHG

## 2024-10-10 DIAGNOSIS — Z95.0 PACEMAKER: Primary | ICD-10-CM

## 2024-10-10 NOTE — PROGRESS NOTES
Patient here to today for for 1 month follow-up status post dual-chamber pacemaker  Patient reports he is feeling well.  He denies chest pain, palpitations, shortness of breath, lightheadedness, dizziness, edema or syncope  Left upper chest site well-approximated.  No redness no swelling no hematoma  Device Assessment:      The device is [employer]tronic pacemaker - Dual Chamber chamber      MRI Compatible : yes    Device interrogation was performed.    Mode: AAI --- DDD     Sensing is normal. Impedence is normal.  Threshold is normal.     There has not been interval changes.     Estimated battery life is 14.8 years     The underlying rhythm is AS,VS.  10.8 % atrial paced; <0.1 % ventricular paced.      Atrial Arrhythmia : No    Non sustained VT episodes : No    Sustained VT episodes : No    Patient activity reported 1.3 hrs/day    The patient is not pacemaker dependent.      Device is functioning appropriately.   EP to continue to monitor transmissions as scheduled.   Patient to follow up with Cardiology as scheduled

## 2024-11-27 ENCOUNTER — HOSPITAL ENCOUNTER (OUTPATIENT)
Age: 69
Discharge: HOME | End: 2024-11-27
Payer: MEDICARE

## 2024-11-27 LAB
ALBUMIN SERPL-MCNC: 4.1 G/DL (ref 3.4–5)
ANION GAP SERPL CALCULATED.3IONS-SCNC: 11 MMOL/L (ref 9–17)
BILIRUB UR QL STRIP: NEGATIVE
BUN SERPL-MCNC: 11 MG/DL (ref 7–20)
CALCIUM SERPL-MCNC: 9.5 MG/DL (ref 8.3–10.6)
CHLORIDE SERPL-SCNC: 99 MMOL/L (ref 99–110)
CLARITY UR: CLEAR
CO2 SERPL-SCNC: 30 MMOL/L (ref 21–32)
COLOR UR: YELLOW
COMMENT: NORMAL
CREAT SERPL-MCNC: 1.1 MG/DL (ref 0.8–1.3)
CREAT UR-MCNC: 131 MG/DL (ref 39–259)
GFR, ESTIMATED: 65 ML/MIN/1.73M2
GLUCOSE SERPL-MCNC: 134 MG/DL (ref 74–99)
GLUCOSE UR STRIP-MCNC: NEGATIVE MG/DL
HGB UR QL STRIP.AUTO: NEGATIVE
KETONES UR STRIP-MCNC: NEGATIVE MG/DL
LEUKOCYTE ESTERASE UR QL STRIP: NEGATIVE
MAGNESIUM SERPL-MCNC: 1.9 MG/DL (ref 1.8–2.4)
NITRITE UR QL STRIP: NEGATIVE
PH UR STRIP: 6 [PH] (ref 5–8)
PHOSPHATE SERPL-MCNC: 2.9 MG/DL (ref 2.5–4.9)
POTASSIUM SERPL-SCNC: 4.4 MMOL/L (ref 3.5–5.1)
PROT UR STRIP-MCNC: NEGATIVE MG/DL
SODIUM SERPL-SCNC: 139 MMOL/L (ref 136–145)
SP GR UR STRIP: 1.02 (ref 1–1.03)
TOTAL PROTEIN, URINE: 11 MG/DL
URINE TOTAL PROTEIN CREATININE RATIO: 0.08 (ref 0–0.2)
UROBILINOGEN UR STRIP-ACNC: 0.2 EU/DL (ref 0–1)

## 2024-11-27 PROCEDURE — 36415 COLL VENOUS BLD VENIPUNCTURE: CPT

## 2024-11-27 PROCEDURE — 84100 ASSAY OF PHOSPHORUS: CPT

## 2024-11-27 PROCEDURE — 82040 ASSAY OF SERUM ALBUMIN: CPT

## 2024-11-27 PROCEDURE — 82570 ASSAY OF URINE CREATININE: CPT

## 2024-11-27 PROCEDURE — 83735 ASSAY OF MAGNESIUM: CPT

## 2024-11-27 PROCEDURE — 80048 BASIC METABOLIC PNL TOTAL CA: CPT

## 2024-11-27 PROCEDURE — 84156 ASSAY OF PROTEIN URINE: CPT

## 2024-11-27 PROCEDURE — 81003 URINALYSIS AUTO W/O SCOPE: CPT

## 2024-12-04 PROBLEM — N18.2 CHRONIC KIDNEY DISEASE, STAGE II (MILD): Status: ACTIVE | Noted: 2024-12-04

## 2024-12-13 ENCOUNTER — TELEPHONE (OUTPATIENT)
Dept: CARDIOLOGY CLINIC | Age: 69
End: 2024-12-13

## 2024-12-13 NOTE — TELEPHONE ENCOUNTER
Patient states B/P elevated for 10-14 days, 160-180/105-120. HR 70-80.  Patient taking Lisinopril 20mg BID, Coreg 25mg BID  Denies symptoms. Patient had CABG 2013 and concerned it is time to repeat.  Advised I will talk to Dr Maynard Monday morning. If continues to elevate patient will go to [facility] ED

## 2024-12-16 NOTE — TELEPHONE ENCOUNTER
Discussed with Dr Maynard, no meds ordered by this office for years. Patient actively seeing Dr Voss, advised to call renal. Patient advised and voices understanding.

## 2024-12-23 ENCOUNTER — HOSPITAL ENCOUNTER (OUTPATIENT)
Age: 69
Discharge: HOME | End: 2024-12-23
Payer: MEDICARE

## 2024-12-23 LAB
ALBUMIN SERPL-MCNC: 4.1 G/DL (ref 3.4–5)
ALBUMIN/GLOB SERPL: 1.8 {RATIO} (ref 1.1–2.2)
ALP SERPL-CCNC: 74 U/L (ref 40–129)
ALT SERPL-CCNC: 20 U/L (ref 10–40)
ANION GAP SERPL CALCULATED.3IONS-SCNC: 10 MMOL/L (ref 9–17)
AST SERPL-CCNC: 24 U/L (ref 15–37)
BILIRUB SERPL-MCNC: 0.2 MG/DL (ref 0–1)
BUN SERPL-MCNC: 14 MG/DL (ref 7–20)
CALCIUM SERPL-MCNC: 9.7 MG/DL (ref 8.3–10.6)
CHLORIDE SERPL-SCNC: 103 MMOL/L (ref 99–110)
CHOLEST SERPL-MCNC: 124 MG/DL (ref 125–199)
CO2 SERPL-SCNC: 29 MMOL/L (ref 21–32)
CREAT SERPL-MCNC: 1.5 MG/DL (ref 0.8–1.3)
GFR, ESTIMATED: 45 ML/MIN/1.73M2
GLUCOSE SERPL-MCNC: 91 MG/DL (ref 74–99)
HDLC SERPL-MCNC: 81 MG/DL
LDLC SERPL CALC-MCNC: 31 MG/DL
POTASSIUM SERPL-SCNC: 4.7 MMOL/L (ref 3.5–5.1)
PROT SERPL-MCNC: 6.3 G/DL (ref 6.4–8.2)
SODIUM SERPL-SCNC: 142 MMOL/L (ref 136–145)
T4 FREE SERPL-MCNC: 0.9 NG/DL (ref 0.9–1.8)
TRIGL SERPL-MCNC: 62 MG/DL
TSH SERPL DL<=0.05 MIU/L-ACNC: 0.73 UIU/ML (ref 0.27–4.2)

## 2024-12-23 PROCEDURE — 84439 ASSAY OF FREE THYROXINE: CPT

## 2024-12-23 PROCEDURE — 80061 LIPID PANEL: CPT

## 2024-12-23 PROCEDURE — 84443 ASSAY THYROID STIM HORMONE: CPT

## 2024-12-23 PROCEDURE — 80053 COMPREHEN METABOLIC PANEL: CPT

## 2024-12-23 PROCEDURE — 84153 ASSAY OF PSA TOTAL: CPT

## 2024-12-23 PROCEDURE — 84154 ASSAY OF PSA FREE: CPT

## 2024-12-23 PROCEDURE — G0103 PSA SCREENING: HCPCS

## 2024-12-25 LAB
PROSTATE SPECIFIC ANTIGEN FREE: 0.5 NG/ML
PROSTATE SPECIFIC ANTIGEN PERCENT FREE: 50 %
PROSTATE SPECIFIC ANTIGEN: 1 NG/ML (ref 0–4)

## 2025-01-08 PROCEDURE — 93296 REM INTERROG EVL PM/IDS: CPT | Performed by: INTERNAL MEDICINE

## 2025-01-08 PROCEDURE — 93294 REM INTERROG EVL PM/LDLS PM: CPT | Performed by: INTERNAL MEDICINE

## 2025-02-05 ENCOUNTER — OFFICE VISIT (OUTPATIENT)
Dept: CARDIOLOGY CLINIC | Age: 70
End: 2025-02-05
Payer: MEDICAID

## 2025-02-05 VITALS
OXYGEN SATURATION: 95 % | HEIGHT: 66 IN | SYSTOLIC BLOOD PRESSURE: 124 MMHG | HEART RATE: 56 BPM | BODY MASS INDEX: 25.26 KG/M2 | WEIGHT: 157.2 LBS | DIASTOLIC BLOOD PRESSURE: 70 MMHG

## 2025-02-05 DIAGNOSIS — I49.5 SICK SINUS SYNDROME (HCC): ICD-10-CM

## 2025-02-05 DIAGNOSIS — I10 PRIMARY HYPERTENSION: ICD-10-CM

## 2025-02-05 DIAGNOSIS — Z95.0 PACEMAKER: ICD-10-CM

## 2025-02-05 DIAGNOSIS — G47.33 OSA (OBSTRUCTIVE SLEEP APNEA): ICD-10-CM

## 2025-02-05 DIAGNOSIS — E78.5 HYPERLIPIDEMIA, UNSPECIFIED HYPERLIPIDEMIA TYPE: ICD-10-CM

## 2025-02-05 DIAGNOSIS — Z95.1 S/P CABG X 4: ICD-10-CM

## 2025-02-05 DIAGNOSIS — Z98.61 HISTORY OF PTCA: ICD-10-CM

## 2025-02-05 DIAGNOSIS — I73.9 PVD (PERIPHERAL VASCULAR DISEASE) (HCC): ICD-10-CM

## 2025-02-05 DIAGNOSIS — E11.59 TYPE 2 DIABETES MELLITUS WITH OTHER CIRCULATORY COMPLICATION, UNSPECIFIED WHETHER LONG TERM INSULIN USE (HCC): ICD-10-CM

## 2025-02-05 DIAGNOSIS — I44.1 MOBITZ TYPE II ATRIOVENTRICULAR BLOCK: ICD-10-CM

## 2025-02-05 DIAGNOSIS — I25.810 CORONARY ARTERY DISEASE INVOLVING CORONARY BYPASS GRAFT OF NATIVE HEART WITHOUT ANGINA PECTORIS: Primary | ICD-10-CM

## 2025-02-05 PROCEDURE — 1159F MED LIST DOCD IN RCRD: CPT | Performed by: INTERNAL MEDICINE

## 2025-02-05 PROCEDURE — G8417 CALC BMI ABV UP PARAM F/U: HCPCS | Performed by: INTERNAL MEDICINE

## 2025-02-05 PROCEDURE — G8427 DOCREV CUR MEDS BY ELIG CLIN: HCPCS | Performed by: INTERNAL MEDICINE

## 2025-02-05 PROCEDURE — 99214 OFFICE O/P EST MOD 30 MIN: CPT | Performed by: INTERNAL MEDICINE

## 2025-02-05 PROCEDURE — 1123F ACP DISCUSS/DSCN MKR DOCD: CPT | Performed by: INTERNAL MEDICINE

## 2025-02-05 PROCEDURE — 1160F RVW MEDS BY RX/DR IN RCRD: CPT | Performed by: INTERNAL MEDICINE

## 2025-02-05 PROCEDURE — 3017F COLORECTAL CA SCREEN DOC REV: CPT | Performed by: INTERNAL MEDICINE

## 2025-02-05 PROCEDURE — 3078F DIAST BP <80 MM HG: CPT | Performed by: INTERNAL MEDICINE

## 2025-02-05 PROCEDURE — 3046F HEMOGLOBIN A1C LEVEL >9.0%: CPT | Performed by: INTERNAL MEDICINE

## 2025-02-05 PROCEDURE — 1036F TOBACCO NON-USER: CPT | Performed by: INTERNAL MEDICINE

## 2025-02-05 PROCEDURE — 3074F SYST BP LT 130 MM HG: CPT | Performed by: INTERNAL MEDICINE

## 2025-02-05 PROCEDURE — 2022F DILAT RTA XM EVC RTNOPTHY: CPT | Performed by: INTERNAL MEDICINE

## 2025-02-05 RX ORDER — SPIRONOLACTONE 25 MG/1
25 TABLET ORAL 2 TIMES DAILY
COMMUNITY
Start: 2024-12-19

## 2025-02-05 RX ORDER — ASPIRIN 325 MG
325 TABLET ORAL DAILY
COMMUNITY

## 2025-02-05 NOTE — PROGRESS NOTES
Patient Name: Elie Vance  : 1955  MRN# [id number]    REASON FOR VISIT: 6 month follow  Patient Active Problem List    Diagnosis Date Noted    ESTEFANIA (obstructive sleep apnea) 2023    Hypersomnia 2023    Chronic kidney disease, stage II (mild) 2024    Pacemaker 10/10/2024    Mobitz type II atrioventricular block 2024    ELLEN (acute kidney injury) ([facility]) 2024    Sick sinus syndrome ([facility]) 2024    Hypomagnesemia 07/15/2024    Chronic kidney disease, stage I 2024    Hypoglycemia 2023    Grade II hemorrhoids 2020    PVD (peripheral vascular disease) ([facility]) 10/22/2019    Neuropathy 10/22/2019    Hypertensive emergency 2017    Chondromalacia of medial tibial plateau 2016    Lateral meniscus tear 2016    Acute medial meniscal injury of knee 2016    H/O echocardiogram 2013    History of PTCA     Hypertension     Kidney stone     Diabetes mellitus ([facility])     History of shingles     Hyperlipidemia     S/P CABG x 4 2013    CAD (coronary artery disease) 2013     CURRENT SX:     Chest Pain :    When did it begin:    How long does it last:    How severe 1-10:    Radiation:    Aggravating factors:    Relieving factors:    Associated features:    Tenderness to palp:    Shortness of Breath:    When did it start:    How many flights of stairs can they climb without SOB:    Associated features:    Orthopena; how many pillows do they sleep with under your head :    Dizziness    Palpitations:    When did it begin:    How often do they occur:    How long do they last:    Aggravating factors:    Relieving factors:    Associated features:    Any thyroid issues:    How much caffeine:    Edema    Do you Exercise??    LABS:  Lab Results   Component Value Date    CHOL 124 (L) 2024    TRIG 62 2024    HDL 81 2024    LDLDIRECT 33 2020    CHOLHDLRATIO 2.0 2023    TSH 0.73 2024     Hemoglobin A1C   Date Value

## 2025-02-05 NOTE — PROGRESS NOTES
OFFICE PROGRESS NOTES      Elie is a 69 y.o. male who has    CHIEF COMPLAINT AS FOLLOWS:  CHEST PAIN:  Patient denies any C/O chest pains at this time.      SOB: No C/O SOB at this time.                 LEG EDEMA: No leg edema   PALPITATIONS: Denies any C/O Palpitations   DIZZINESS: No C/O Dizziness     SYNCOPE: None   OTHER/ ADDITIONAL COMPLAINTS:                                     HPI: Patient is here for F/U on his CAD,  Arrhythmia S/P PPM , HTN & Dyslipidemia problems.   CAD: Patient has known CAD. Had angioplasty / CABG, both in the past.  Arrhythmia: Patient has known H/O SSS / Mobitz type II AV block status post permanent pacemaker implantation.  HTN: Patient has known essential HTN. Has been treated with guideline recommended medical / physical/ diet therapy as stated below.  Dyslipidemia: Patient has known mixed dyslipidemia. Has been treated with guideline recommended medical / physical/ diet therapy as stated below.                Current Outpatient Medications   Medication Sig Dispense Refill    aspirin 325 MG tablet Take 1 tablet by mouth daily      spironolactone (ALDACTONE) 25 MG tablet Take 1 tablet by mouth 2 times daily      INSULIN LISPRO SC Inject 8 Units into the skin 3 times daily (with meals)      ONETOUCH VERIO strip USE AS DIRECTED TO TEST BLOOD GLUCOSE THREE TIMES DAILY AND AS NEEDED      BD PEN NEEDLE MARLYS 2ND GEN 32G X 4 MM MISC TO USE WITH INSULIN 4 TIMES A DAY AND ONCE WEEKLY OZEMPIC      OZEMPIC, 1 MG/DOSE, 4 MG/3ML SOPN sc injection Inject 1 mg into the skin every 7 days      carvedilol (COREG) 25 MG tablet Take 1 tablet by mouth 2 times daily (with meals)      atorvastatin (LIPITOR) 40 MG tablet       niacin 500 MG extended release capsule Take 1 capsule by mouth nightly      b complex vitamins capsule Take 1 capsule by mouth daily      vitamin C (ASCORBIC ACID) 500 MG tablet Take 1 tablet by mouth daily      Cholecalciferol (VITAMIN D3) 125 MCG (5000 UT) CAPS Take 1 capsule by

## 2025-02-05 NOTE — PATIENT INSTRUCTIONS
interpreted, all previously ordered tests as copied above. Latest Labs are pulled in to the note with dates.   Labs, specially in Reference to Lipid profile, Cardiac testing in the form of Echo ( dated: ), stress tests ( dated: ) & other relevant cardiac testing reviewed with patient & recommendations made based on assessment of the results.    Discussed role of Cardiac risk factors & effects + treatment of co morbidities with patient & advised accordingly.     MEDICATIONS: List of medications patient is currently taking is reviewed in detail with the patient. Discussed any side effects or problems taking the medication.     Recommend Continue present management & medications as listed.     AFFIRMATION: I reviewed patient's history, previous & current medical problems & all Labs + testing. This includes chart prep even prior to the vosit. Various goals are discussed and multiple questions answered.Relevant concelling performed.     Office follow up in six months. Device check per protocol.

## 2025-04-09 PROCEDURE — 93294 REM INTERROG EVL PM/LDLS PM: CPT | Performed by: INTERNAL MEDICINE

## 2025-04-09 PROCEDURE — 93296 REM INTERROG EVL PM/IDS: CPT | Performed by: INTERNAL MEDICINE

## 2025-06-25 ENCOUNTER — HOSPITAL ENCOUNTER (OUTPATIENT)
Age: 70
Discharge: HOME | End: 2025-06-25
Payer: MEDICARE

## 2025-06-25 DIAGNOSIS — I10 PRIMARY HYPERTENSION: ICD-10-CM

## 2025-06-25 DIAGNOSIS — Z95.1 S/P CABG X 4: ICD-10-CM

## 2025-06-25 DIAGNOSIS — N18.2 CHRONIC KIDNEY DISEASE, STAGE II (MILD): ICD-10-CM

## 2025-06-25 DIAGNOSIS — E11.59 TYPE 2 DIABETES MELLITUS WITH OTHER CIRCULATORY COMPLICATION, UNSPECIFIED WHETHER LONG TERM INSULIN USE (HCC): ICD-10-CM

## 2025-06-25 DIAGNOSIS — N20.0 KIDNEY STONE: ICD-10-CM

## 2025-06-25 DIAGNOSIS — Z95.0 PACEMAKER: ICD-10-CM

## 2025-06-25 LAB
ALBUMIN SERPL-MCNC: 4.2 G/DL (ref 3.4–5)
ANION GAP SERPL CALCULATED.3IONS-SCNC: 13 MMOL/L (ref 9–17)
BASOPHILS # BLD: 0.03 K/UL
BASOPHILS NFR BLD: 0 % (ref 0–1)
BILIRUB UR QL STRIP: NEGATIVE
BUN SERPL-MCNC: 31 MG/DL (ref 7–20)
CALCIUM SERPL-MCNC: 9.6 MG/DL (ref 8.3–10.6)
CHLORIDE SERPL-SCNC: 106 MMOL/L (ref 99–110)
CLARITY UR: CLEAR
CO2 SERPL-SCNC: 19 MMOL/L (ref 21–32)
COLOR UR: YELLOW
COMMENT: ABNORMAL
CREAT SERPL-MCNC: 1.6 MG/DL (ref 0.8–1.3)
CREAT UR-MCNC: 202 MG/DL (ref 39–259)
EOSINOPHIL # BLD: 0.27 K/UL
EOSINOPHILS RELATIVE PERCENT: 3 % (ref 0–3)
ERYTHROCYTE [DISTWIDTH] IN BLOOD BY AUTOMATED COUNT: 11.3 % (ref 11.7–14.9)
GFR, ESTIMATED: 42 ML/MIN/1.73M2
GLUCOSE SERPL-MCNC: 120 MG/DL (ref 74–99)
GLUCOSE UR STRIP-MCNC: NEGATIVE MG/DL
HCT VFR BLD AUTO: 42.9 % (ref 42–52)
HGB BLD-MCNC: 14.1 G/DL (ref 13.5–18)
HGB UR QL STRIP.AUTO: NEGATIVE
IMM GRANULOCYTES # BLD AUTO: 0.05 K/UL
IMM GRANULOCYTES NFR BLD: 1 %
KETONES UR STRIP-MCNC: ABNORMAL MG/DL
LEUKOCYTE ESTERASE UR QL STRIP: NEGATIVE
LYMPHOCYTES NFR BLD: 2.29 K/UL
LYMPHOCYTES RELATIVE PERCENT: 24 % (ref 24–44)
MAGNESIUM SERPL-MCNC: 1.8 MG/DL (ref 1.8–2.4)
MCH RBC QN AUTO: 32.4 PG (ref 27–31)
MCHC RBC AUTO-ENTMCNC: 32.9 G/DL (ref 32–36)
MCV RBC AUTO: 98.6 FL (ref 78–100)
MONOCYTES NFR BLD: 0.92 K/UL
MONOCYTES NFR BLD: 9 % (ref 0–5)
NEUTROPHILS NFR BLD: 64 % (ref 36–66)
NEUTS SEG NFR BLD: 6.18 K/UL
NITRITE UR QL STRIP: NEGATIVE
PH UR STRIP: 5.5 [PH] (ref 5–8)
PHOSPHATE SERPL-MCNC: 3.3 MG/DL (ref 2.5–4.9)
PLATELET # BLD AUTO: 200 K/UL (ref 140–440)
PMV BLD AUTO: 12.5 FL (ref 7.5–11.1)
POTASSIUM SERPL-SCNC: 4.9 MMOL/L (ref 3.5–5.1)
PROT UR STRIP-MCNC: NEGATIVE MG/DL
RBC # BLD AUTO: 4.35 M/UL (ref 4.6–6.2)
SODIUM SERPL-SCNC: 138 MMOL/L (ref 136–145)
SP GR UR STRIP: 1.02 (ref 1–1.03)
TOTAL PROTEIN, URINE: 18 MG/DL
URINE TOTAL PROTEIN CREATININE RATIO: 0.09 (ref 0–0.2)
UROBILINOGEN UR STRIP-ACNC: 0.2 EU/DL (ref 0–1)
WBC OTHER # BLD: 9.7 K/UL (ref 4–10.5)

## 2025-06-25 PROCEDURE — 85025 COMPLETE CBC W/AUTO DIFF WBC: CPT

## 2025-06-25 PROCEDURE — 81003 URINALYSIS AUTO W/O SCOPE: CPT

## 2025-06-25 PROCEDURE — 36415 COLL VENOUS BLD VENIPUNCTURE: CPT

## 2025-06-25 PROCEDURE — 84156 ASSAY OF PROTEIN URINE: CPT

## 2025-06-25 PROCEDURE — 83735 ASSAY OF MAGNESIUM: CPT

## 2025-06-25 PROCEDURE — 82040 ASSAY OF SERUM ALBUMIN: CPT

## 2025-06-25 PROCEDURE — 82570 ASSAY OF URINE CREATININE: CPT

## 2025-06-25 PROCEDURE — 84100 ASSAY OF PHOSPHORUS: CPT

## 2025-06-25 PROCEDURE — 80048 BASIC METABOLIC PNL TOTAL CA: CPT

## 2025-07-01 PROBLEM — N18.32 STAGE 3B CHRONIC KIDNEY DISEASE (HCC): Status: ACTIVE | Noted: 2025-07-01

## 2025-07-10 PROCEDURE — 93294 REM INTERROG EVL PM/LDLS PM: CPT | Performed by: INTERNAL MEDICINE

## 2025-07-10 PROCEDURE — 93296 REM INTERROG EVL PM/IDS: CPT | Performed by: INTERNAL MEDICINE

## 2025-08-07 ENCOUNTER — OFFICE VISIT (OUTPATIENT)
Dept: CARDIOLOGY CLINIC | Age: 70
End: 2025-08-07

## 2025-08-07 VITALS
BODY MASS INDEX: 25.07 KG/M2 | HEIGHT: 66 IN | SYSTOLIC BLOOD PRESSURE: 118 MMHG | HEART RATE: 80 BPM | WEIGHT: 156 LBS | DIASTOLIC BLOOD PRESSURE: 70 MMHG

## 2025-08-07 DIAGNOSIS — I25.10 CORONARY ARTERY DISEASE DUE TO LIPID RICH PLAQUE: ICD-10-CM

## 2025-08-07 DIAGNOSIS — Z95.0 PACEMAKER: ICD-10-CM

## 2025-08-07 DIAGNOSIS — I10 PRIMARY HYPERTENSION: ICD-10-CM

## 2025-08-07 DIAGNOSIS — I73.9 PVD (PERIPHERAL VASCULAR DISEASE): ICD-10-CM

## 2025-08-07 DIAGNOSIS — I25.83 CORONARY ARTERY DISEASE DUE TO LIPID RICH PLAQUE: ICD-10-CM

## 2025-08-07 DIAGNOSIS — E78.2 MIXED HYPERLIPIDEMIA: ICD-10-CM

## 2025-08-07 DIAGNOSIS — E08.22 DIABETES MELLITUS DUE TO UNDERLYING CONDITION WITH DIABETIC CHRONIC KIDNEY DISEASE, UNSPECIFIED CKD STAGE, UNSPECIFIED WHETHER LONG TERM INSULIN USE (HCC): ICD-10-CM

## 2025-08-07 DIAGNOSIS — I25.10 CORONARY ARTERY DISEASE INVOLVING NATIVE CORONARY ARTERY OF NATIVE HEART WITHOUT ANGINA PECTORIS: Primary | ICD-10-CM

## 2025-08-07 DIAGNOSIS — Z98.61 HISTORY OF PTCA: ICD-10-CM

## 2025-08-07 DIAGNOSIS — G47.33 OSA (OBSTRUCTIVE SLEEP APNEA): ICD-10-CM

## 2025-08-07 DIAGNOSIS — I44.1 MOBITZ TYPE II ATRIOVENTRICULAR BLOCK: ICD-10-CM

## 2025-08-07 DIAGNOSIS — I49.5 SICK SINUS SYNDROME (HCC): ICD-10-CM

## 2025-08-07 DIAGNOSIS — Z95.1 S/P CABG X 4: ICD-10-CM

## 2025-08-12 ENCOUNTER — TELEPHONE (OUTPATIENT)
Dept: CARDIOLOGY CLINIC | Age: 70
End: 2025-08-12

## 2025-08-21 ENCOUNTER — TELEPHONE (OUTPATIENT)
Dept: CARDIOLOGY CLINIC | Age: 70
End: 2025-08-21

## 2025-08-21 DIAGNOSIS — I25.10 CORONARY ARTERY DISEASE INVOLVING NATIVE CORONARY ARTERY OF NATIVE HEART WITHOUT ANGINA PECTORIS: Primary | ICD-10-CM

## 2025-08-22 ENCOUNTER — TELEPHONE (OUTPATIENT)
Dept: CARDIOLOGY CLINIC | Age: 70
End: 2025-08-22

## (undated) DEVICE — SUTURE VICRYL SZ 2-0 L27IN ABSRB UD L26MM CT-2 1/2 CIR J269H

## (undated) DEVICE — SUTURE ABSORBABLE BRAIDED 2-0 CT-1 27 IN UD VICRYL J259H

## (undated) DEVICE — VALVED PEELABLE PTFE INTRODUCER: Brand: OPTISEAL™

## (undated) DEVICE — SUTURE ETHIBOND EXCEL SZ 0 L30IN NONABSORBABLE GRN CT1 L36MM X424H

## (undated) DEVICE — SUTURE VICRYL SZ 4-0 L18IN ABSRB UD L16MM PS-4 1/2 CIR PRIM J507G

## (undated) DEVICE — Device